# Patient Record
Sex: MALE | Race: BLACK OR AFRICAN AMERICAN | NOT HISPANIC OR LATINO | Employment: OTHER | RURAL
[De-identification: names, ages, dates, MRNs, and addresses within clinical notes are randomized per-mention and may not be internally consistent; named-entity substitution may affect disease eponyms.]

---

## 2022-07-21 ENCOUNTER — OFFICE VISIT (OUTPATIENT)
Dept: FAMILY MEDICINE | Facility: CLINIC | Age: 67
End: 2022-07-21
Payer: OTHER GOVERNMENT

## 2022-07-21 VITALS
SYSTOLIC BLOOD PRESSURE: 108 MMHG | OXYGEN SATURATION: 94 % | WEIGHT: 144.31 LBS | TEMPERATURE: 99 F | BODY MASS INDEX: 20.2 KG/M2 | HEIGHT: 71 IN | DIASTOLIC BLOOD PRESSURE: 82 MMHG | HEART RATE: 60 BPM

## 2022-07-21 DIAGNOSIS — Z86.73 HISTORY OF STROKE: ICD-10-CM

## 2022-07-21 DIAGNOSIS — G89.29 CHRONIC PAIN OF RIGHT KNEE: Chronic | ICD-10-CM

## 2022-07-21 DIAGNOSIS — Z79.899 ENCOUNTER FOR LONG-TERM (CURRENT) USE OF OTHER MEDICATIONS: ICD-10-CM

## 2022-07-21 DIAGNOSIS — R56.9 SEIZURES: ICD-10-CM

## 2022-07-21 DIAGNOSIS — Z12.5 PROSTATE CANCER SCREENING: ICD-10-CM

## 2022-07-21 DIAGNOSIS — E78.00 HYPERCHOLESTEREMIA: Chronic | ICD-10-CM

## 2022-07-21 DIAGNOSIS — M54.41 CHRONIC BILATERAL LOW BACK PAIN WITH RIGHT-SIDED SCIATICA: Chronic | ICD-10-CM

## 2022-07-21 DIAGNOSIS — I10 ESSENTIAL (PRIMARY) HYPERTENSION: Primary | Chronic | ICD-10-CM

## 2022-07-21 DIAGNOSIS — M25.561 CHRONIC PAIN OF RIGHT KNEE: Chronic | ICD-10-CM

## 2022-07-21 DIAGNOSIS — G89.29 CHRONIC BILATERAL LOW BACK PAIN WITH RIGHT-SIDED SCIATICA: Chronic | ICD-10-CM

## 2022-07-21 DIAGNOSIS — Z11.59 NEED FOR HEPATITIS C SCREENING TEST: ICD-10-CM

## 2022-07-21 PROBLEM — F17.210 NICOTINE DEPENDENCE, CIGARETTES, UNCOMPLICATED: Status: ACTIVE | Noted: 2022-07-21

## 2022-07-21 LAB
ALBUMIN SERPL BCP-MCNC: 3.8 G/DL (ref 3.5–5)
ALBUMIN/GLOB SERPL: 0.9 {RATIO}
ALP SERPL-CCNC: 102 U/L (ref 45–115)
ALT SERPL W P-5'-P-CCNC: 12 U/L (ref 16–61)
ANION GAP SERPL CALCULATED.3IONS-SCNC: 17 MMOL/L (ref 7–16)
AST SERPL W P-5'-P-CCNC: 11 U/L (ref 15–37)
BASOPHILS # BLD AUTO: 0.08 K/UL (ref 0–0.2)
BASOPHILS NFR BLD AUTO: 0.7 % (ref 0–1)
BILIRUB SERPL-MCNC: 0.4 MG/DL (ref 0–1.2)
BUN SERPL-MCNC: 24 MG/DL (ref 7–18)
BUN/CREAT SERPL: 13 (ref 6–20)
CALCIUM SERPL-MCNC: 9 MG/DL (ref 8.5–10.1)
CHLORIDE SERPL-SCNC: 103 MMOL/L (ref 98–107)
CHOLEST SERPL-MCNC: 211 MG/DL (ref 0–200)
CHOLEST/HDLC SERPL: 2.6 {RATIO}
CO2 SERPL-SCNC: 20 MMOL/L (ref 21–32)
CREAT SERPL-MCNC: 1.81 MG/DL (ref 0.7–1.3)
DIFFERENTIAL METHOD BLD: ABNORMAL
EOSINOPHIL # BLD AUTO: 0.27 K/UL (ref 0–0.5)
EOSINOPHIL NFR BLD AUTO: 2.5 % (ref 1–4)
ERYTHROCYTE [DISTWIDTH] IN BLOOD BY AUTOMATED COUNT: 14.1 % (ref 11.5–14.5)
GLOBULIN SER-MCNC: 4.2 G/DL (ref 2–4)
GLUCOSE SERPL-MCNC: 75 MG/DL (ref 74–106)
HCT VFR BLD AUTO: 41.4 % (ref 40–54)
HCV AB SER QL: NORMAL
HDLC SERPL-MCNC: 80 MG/DL (ref 40–60)
HGB BLD-MCNC: 14 G/DL (ref 13.5–18)
IMM GRANULOCYTES # BLD AUTO: 0.03 K/UL (ref 0–0.04)
IMM GRANULOCYTES NFR BLD: 0.3 % (ref 0–0.4)
LDLC SERPL CALC-MCNC: 110 MG/DL
LDLC/HDLC SERPL: 1.4 {RATIO}
LYMPHOCYTES # BLD AUTO: 1.69 K/UL (ref 1–4.8)
LYMPHOCYTES NFR BLD AUTO: 15.7 % (ref 27–41)
MCH RBC QN AUTO: 31 PG (ref 27–31)
MCHC RBC AUTO-ENTMCNC: 33.8 G/DL (ref 32–36)
MCV RBC AUTO: 91.6 FL (ref 80–96)
MONOCYTES # BLD AUTO: 1 K/UL (ref 0–0.8)
MONOCYTES NFR BLD AUTO: 9.3 % (ref 2–6)
MPC BLD CALC-MCNC: 11 FL (ref 9.4–12.4)
NEUTROPHILS # BLD AUTO: 7.71 K/UL (ref 1.8–7.7)
NEUTROPHILS NFR BLD AUTO: 71.5 % (ref 53–65)
NONHDLC SERPL-MCNC: 131 MG/DL
NRBC # BLD AUTO: 0 X10E3/UL
NRBC, AUTO (.00): 0 %
PLATELET # BLD AUTO: 433 K/UL (ref 150–400)
POTASSIUM SERPL-SCNC: 4.3 MMOL/L (ref 3.5–5.1)
PROT SERPL-MCNC: 8 G/DL (ref 6.4–8.2)
PSA SERPL-MCNC: 6.5 NG/ML (ref 0–4.1)
RBC # BLD AUTO: 4.52 M/UL (ref 4.6–6.2)
SODIUM SERPL-SCNC: 136 MMOL/L (ref 136–145)
TRIGL SERPL-MCNC: 103 MG/DL (ref 35–150)
TSH SERPL DL<=0.005 MIU/L-ACNC: 0.73 UIU/ML (ref 0.36–3.74)
VLDLC SERPL-MCNC: 21 MG/DL
WBC # BLD AUTO: 10.78 K/UL (ref 4.5–11)

## 2022-07-21 PROCEDURE — 86803 HEPATITIS C ANTIBODY: ICD-10-PCS | Mod: ,,, | Performed by: CLINICAL MEDICAL LABORATORY

## 2022-07-21 PROCEDURE — 80061 LIPID PANEL: CPT | Mod: ,,, | Performed by: CLINICAL MEDICAL LABORATORY

## 2022-07-21 PROCEDURE — 80053 COMPREHENSIVE METABOLIC PANEL: ICD-10-PCS | Mod: ,,, | Performed by: CLINICAL MEDICAL LABORATORY

## 2022-07-21 PROCEDURE — 99204 OFFICE O/P NEW MOD 45 MIN: CPT | Mod: ,,, | Performed by: NURSE PRACTITIONER

## 2022-07-21 PROCEDURE — 85025 COMPLETE CBC W/AUTO DIFF WBC: CPT | Mod: ,,, | Performed by: CLINICAL MEDICAL LABORATORY

## 2022-07-21 PROCEDURE — 86803 HEPATITIS C AB TEST: CPT | Mod: ,,, | Performed by: CLINICAL MEDICAL LABORATORY

## 2022-07-21 PROCEDURE — 80061 LIPID PANEL: ICD-10-PCS | Mod: ,,, | Performed by: CLINICAL MEDICAL LABORATORY

## 2022-07-21 PROCEDURE — 99204 PR OFFICE/OUTPT VISIT, NEW, LEVL IV, 45-59 MIN: ICD-10-PCS | Mod: ,,, | Performed by: NURSE PRACTITIONER

## 2022-07-21 PROCEDURE — G0103 PSA SCREENING: HCPCS | Mod: ,,, | Performed by: CLINICAL MEDICAL LABORATORY

## 2022-07-21 PROCEDURE — 84443 ASSAY THYROID STIM HORMONE: CPT | Mod: ,,, | Performed by: CLINICAL MEDICAL LABORATORY

## 2022-07-21 PROCEDURE — 84443 TSH: ICD-10-PCS | Mod: ,,, | Performed by: CLINICAL MEDICAL LABORATORY

## 2022-07-21 PROCEDURE — 80053 COMPREHEN METABOLIC PANEL: CPT | Mod: ,,, | Performed by: CLINICAL MEDICAL LABORATORY

## 2022-07-21 PROCEDURE — G0103 PSA, SCREENING: ICD-10-PCS | Mod: ,,, | Performed by: CLINICAL MEDICAL LABORATORY

## 2022-07-21 PROCEDURE — 85025 CBC WITH DIFFERENTIAL: ICD-10-PCS | Mod: ,,, | Performed by: CLINICAL MEDICAL LABORATORY

## 2022-07-21 RX ORDER — ATORVASTATIN CALCIUM 40 MG/1
40 TABLET, FILM COATED ORAL DAILY
Qty: 90 TABLET | Refills: 3 | Status: SHIPPED | OUTPATIENT
Start: 2022-07-21 | End: 2022-10-27 | Stop reason: SDUPTHER

## 2022-07-21 RX ORDER — ASPIRIN 325 MG
325 TABLET ORAL DAILY
COMMUNITY
End: 2023-05-08 | Stop reason: DRUGHIGH

## 2022-07-21 NOTE — PROGRESS NOTES
JUSTINA ROMAN [facility] - FAMILY MEDICINE       PATIENT NAME: Steven Campo   : 1955    AGE: 66 y.o. DATE: 2022    MRN: [id number]        Reason for Visit / Chief Complaint:  [facility] (Patient need to start back taking medication for bp, high cholesterol. Patient states he is having pain in his back and knee. And was also taking meds for stroke. Patient states he was taking pills for nightmares but he stop taking them. Patient is no longer having nightmares. )     Subjective:     HPI:    Presents with sister to establish a new provider.     [facility] [place] but [facility] is no longer there. Was advised to get a PCP closer to home (lives in [place]) but hasn't seen anyone or taken any meds in 1 yr. Does not know what meds he was on and no records are available.  Hx nightmares in the past after being [place], but none recently.    Hx HTN but BP today is okay.    Hx chronic R sided weakness since stroke. Chronic R knee & LBP - tylenol helps, but was on rx in the past.     Hx seizures on med in past, last sz approx 7 mths ago.    No past resp conditions but admits chronic cough & SOB.  Smoking 1.5 ppd x 44 yrs.    Reports had a colonoscopy at [facility] in the past w/ 3 polyps removed.    Review of Systems:     Review of Systems   Constitutional: Negative.    HENT: Negative.    Eyes: Negative.    Respiratory: Positive for cough and shortness of breath. Negative for wheezing.    Cardiovascular: Negative.    Gastrointestinal: Negative.    Endocrine: Negative.    Genitourinary: Negative.    Musculoskeletal: Negative.    Skin: Negative.    Allergic/Immunologic: Negative.    Neurological: Negative.    Hematological: Negative.    Psychiatric/Behavioral: Negative.        Allergies:     Review of patient's allergies indicates:  No Known Allergies     Medical History:     Past Medical History:   Diagnosis Date    Chronic bilateral low back pain with  "right-sided sciatica     Chronic pain of right knee     Essential (primary) hypertension     Hypercholesteremia     Nicotine dependence, cigarettes, uncomplicated     Seizures 2020    last was approx 7 mths ago    Stroke 2015    with residual right sided weakness      Social History     Tobacco Use   Smoking Status Current Every Day Smoker    Packs/day: 1.50    Years: 44.00    Pack years: 66.00    Types: Cigarettes   Smokeless Tobacco Never Used      Past Surgical History:   Procedure Laterality Date    KNEE ARTHROSCOPY Right 1995      Objective:      Wt Readings from Last 3 Encounters:   07/21/22 1329 65.5 kg (144 lb 4.8 oz)     Vitals:    07/21/22 1329   BP: 108/82   BP Location: Left arm   Patient Position: Sitting   BP Method: Large (Manual)   Pulse: 60   Temp: 98.6 °F (37 °C)   SpO2: (!) 94%   Weight: 65.5 kg (144 lb 4.8 oz)   Height: 5' 11" (1.803 m)     Body mass index is 20.13 kg/m².     Physical Exam:    Physical Exam  Vitals and nursing note reviewed.   Constitutional:       General: He is not in acute distress.     Appearance: Normal appearance.   HENT:      Head: Normocephalic.      Right Ear: Tympanic membrane, ear canal and external ear normal.      Left Ear: Tympanic membrane, ear canal and external ear normal.      Nose: Nose normal.      Mouth/Throat:      Mouth: Mucous membranes are moist.      Pharynx: Oropharynx is clear.   Eyes:      Conjunctiva/sclera: Conjunctivae normal.   Neck:      Thyroid: No thyromegaly.      Vascular: Normal carotid pulses. No carotid bruit.   Cardiovascular:      Rate and Rhythm: Normal rate and regular rhythm.      Pulses: Normal pulses.      Heart sounds: Normal heart sounds.   Pulmonary:      Effort: Pulmonary effort is normal. No respiratory distress.      Breath sounds: Normal breath sounds.   Abdominal:      Palpations: Abdomen is soft.      Tenderness: There is no abdominal tenderness.   Musculoskeletal:      Cervical back: Neck supple.      Right " lower leg: No edema.      Left lower leg: No edema.   Lymphadenopathy:      Cervical: No cervical adenopathy.   Skin:     General: Skin is warm and dry.   Neurological:      General: No focal deficit present.      Mental Status: He is alert and oriented to person, place, and time.      Motor: Weakness (RUE & RLE) present.      Gait: Gait abnormal.   Psychiatric:         Mood and Affect: Mood normal.         Behavior: Behavior normal.          Assessment:          ICD-10-CM ICD-9-CM   1. Essential (primary) hypertension  I10 401.9   2. Hypercholesteremia  E78.00 272.0   3. History of stroke  Z86.73 V12.54   4. Chronic bilateral low back pain with right-sided sciatica  M54.41 724.2    G89.29 724.3     338.29   5. Chronic pain of right knee  M25.561 719.46    G89.29 338.29   6. Seizures  R56.9 780.39   7. Need for hepatitis C screening test  Z11.59 V73.89   8. Encounter for long-term (current) use of other medications  Z79.899 V58.69   9. Prostate cancer screening  Z12.5 V76.44        Plan:       Essential (primary) hypertension    Hypercholesteremia  -     Comprehensive Metabolic Panel; Future; Expected date: 07/21/2022  -     Lipid Panel; Future; Expected date: 07/21/2022  -     atorvastatin (LIPITOR) 40 MG tablet; Take 1 tablet (40 mg total) by mouth once daily.  Dispense: 90 tablet; Refill: 3    History of stroke  -     Ambulatory referral/consult to Neurology; Future; Expected date: 07/28/2022    Chronic bilateral low back pain with right-sided sciatica    Chronic pain of right knee    Seizures  -     Ambulatory referral/consult to Neurology; Future; Expected date: 07/28/2022    Need for hepatitis C screening test  -     Hepatitis C Antibody; Future; Expected date: 07/21/2022    Encounter for long-term (current) use of other medications  -     CBC Auto Differential; Future; Expected date: 07/21/2022  -     Comprehensive Metabolic Panel; Future; Expected date: 07/21/2022  -     TSH; Future; Expected date:  07/21/2022    Prostate cancer screening  -     PSA, Screening; Future; Expected date: 07/21/2022        Current Outpatient Medications:     aspirin 325 MG tablet, Take 325 mg by mouth once daily., Disp: , Rfl:     atorvastatin (LIPITOR) 40 MG tablet, Take 1 tablet (40 mg total) by mouth once daily., Disp: 90 tablet, Rfl: 3  Active Problem List with Overview Notes    Diagnosis Date Noted    Essential (primary) hypertension 07/21/2022    Hypercholesteremia 07/21/2022    Chronic bilateral low back pain with right-sided sciatica 07/21/2022    Chronic pain of right knee 07/21/2022    Seizures 07/21/2022     last was approx 7 mths ago      Nicotine dependence, cigarettes, uncomplicated 07/21/2022    History of stroke 2015     with residual right sided weakness         New & refilled meds:  Requested Prescriptions     Signed Prescriptions Disp Refills    atorvastatin (LIPITOR) 40 MG tablet 90 tablet 3     Sig: Take 1 tablet (40 mg total) by mouth once daily.     No xray coverage in clinic.  Will not start a BP med at this time but will need to monitor closely.  Continue aspirin. Resume statin.  Labs today  very strongly urged to quit smoking  Refer to Neuro for hx of seizures & stroke w/ R sided weakness.    Return to clinic 3 mths for HLD, hx stroke, w/ fasting labs; and f/u as needed.    Future Appointments   Date Time Provider Department Center   8/30/2022  3:00 PM Harsha Sanchez MD [facility] NEURO [facility]   10/27/2022  9:20 AM JODY Jimenez [facility] [place]        Signature:  JODY Jimenez [facility] PRIMARY CARE - FAMILY MEDICINE    Date of encounter: 7/21/22

## 2022-07-28 DIAGNOSIS — R97.20 PSA ELEVATION: Primary | ICD-10-CM

## 2022-10-10 ENCOUNTER — OFFICE VISIT (OUTPATIENT)
Dept: FAMILY MEDICINE | Facility: CLINIC | Age: 67
End: 2022-10-10
Payer: OTHER GOVERNMENT

## 2022-10-10 ENCOUNTER — OFFICE VISIT (OUTPATIENT)
Dept: NEUROLOGY | Facility: CLINIC | Age: 67
End: 2022-10-10
Payer: MEDICARE

## 2022-10-10 VITALS
DIASTOLIC BLOOD PRESSURE: 96 MMHG | BODY MASS INDEX: 20.13 KG/M2 | RESPIRATION RATE: 20 BRPM | TEMPERATURE: 98 F | OXYGEN SATURATION: 95 % | HEIGHT: 71 IN | SYSTOLIC BLOOD PRESSURE: 142 MMHG | HEART RATE: 92 BPM

## 2022-10-10 VITALS
HEART RATE: 95 BPM | OXYGEN SATURATION: 95 % | HEIGHT: 71 IN | WEIGHT: 145 LBS | SYSTOLIC BLOOD PRESSURE: 160 MMHG | DIASTOLIC BLOOD PRESSURE: 90 MMHG | BODY MASS INDEX: 20.3 KG/M2

## 2022-10-10 DIAGNOSIS — I10 ESSENTIAL (PRIMARY) HYPERTENSION: Chronic | ICD-10-CM

## 2022-10-10 DIAGNOSIS — J22 LRTI (LOWER RESPIRATORY TRACT INFECTION): Primary | ICD-10-CM

## 2022-10-10 DIAGNOSIS — I63.89 OTHER CEREBRAL INFARCTION: ICD-10-CM

## 2022-10-10 DIAGNOSIS — R56.9 CONVULSIONS, UNSPECIFIED CONVULSION TYPE: ICD-10-CM

## 2022-10-10 DIAGNOSIS — R56.9 SEIZURES: Primary | ICD-10-CM

## 2022-10-10 DIAGNOSIS — F17.210 NICOTINE DEPENDENCE, CIGARETTES, UNCOMPLICATED: Chronic | ICD-10-CM

## 2022-10-10 DIAGNOSIS — J20.9 CHRONIC BRONCHITIS WITH ACUTE EXACERBATION: ICD-10-CM

## 2022-10-10 DIAGNOSIS — J42 CHRONIC BRONCHITIS WITH ACUTE EXACERBATION: ICD-10-CM

## 2022-10-10 PROCEDURE — 99204 PR OFFICE/OUTPT VISIT, NEW, LEVL IV, 45-59 MIN: ICD-10-PCS | Mod: S$PBB,,, | Performed by: PSYCHIATRY & NEUROLOGY

## 2022-10-10 PROCEDURE — 99214 OFFICE O/P EST MOD 30 MIN: CPT | Mod: PBBFAC | Performed by: PSYCHIATRY & NEUROLOGY

## 2022-10-10 PROCEDURE — 99214 PR OFFICE/OUTPT VISIT, EST, LEVL IV, 30-39 MIN: ICD-10-PCS | Mod: ,,, | Performed by: NURSE PRACTITIONER

## 2022-10-10 PROCEDURE — 99214 OFFICE O/P EST MOD 30 MIN: CPT | Mod: ,,, | Performed by: NURSE PRACTITIONER

## 2022-10-10 PROCEDURE — 99204 OFFICE O/P NEW MOD 45 MIN: CPT | Mod: S$PBB,,, | Performed by: PSYCHIATRY & NEUROLOGY

## 2022-10-10 RX ORDER — IPRATROPIUM BROMIDE AND ALBUTEROL SULFATE 2.5; .5 MG/3ML; MG/3ML
3 SOLUTION RESPIRATORY (INHALATION) EVERY 6 HOURS PRN
Qty: 300 ML | Refills: 0 | Status: SHIPPED | OUTPATIENT
Start: 2022-10-10 | End: 2023-05-08 | Stop reason: SDUPTHER

## 2022-10-10 RX ORDER — BUDESONIDE 0.5 MG/2ML
0.5 INHALANT ORAL 2 TIMES DAILY
Qty: 360 ML | Refills: 1 | Status: SHIPPED | OUTPATIENT
Start: 2022-10-10 | End: 2023-05-08 | Stop reason: SDUPTHER

## 2022-10-10 RX ORDER — DOXYCYCLINE HYCLATE 100 MG
100 TABLET ORAL EVERY 12 HOURS
Qty: 20 TABLET | Refills: 0 | Status: SHIPPED | OUTPATIENT
Start: 2022-10-10 | End: 2022-10-20

## 2022-10-10 RX ORDER — PREDNISONE 20 MG/1
TABLET ORAL
Qty: 9 TABLET | Refills: 0 | Status: SHIPPED | OUTPATIENT
Start: 2022-10-10 | End: 2022-10-27 | Stop reason: ALTCHOICE

## 2022-10-10 NOTE — PROGRESS NOTES
"[facility]       PATIENT NAME: Steven Campo   : 1955    AGE: 67 y.o. DATE OF ENCOUNTER: 10/10/22   MRN: [id number]      Visit type: WALK-IN    Reason for Visit / Chief Complaint: Shortness of Breath (Pt presents with SOB x 3 weeks. He went to [facility] on 22. He was given 2 shots and some abx. He states he feels a little better since that visit. He denies fever or any other URI sxs.)     Subjective:     Presents w/ sister for c/o increased SOB x 3 weeks.  Went to [facility] 22 & had CXR - was tx w/ zpak & prednisone. Had some improvement but hasn't resolved & is having pain to right posterior ribcage w/ productive cough.   Hx chronic cough & SOB, smoker 1.5 ppd x 44 yrs.    Review of Systems:     Review of Systems   Constitutional: Negative.    HENT: Negative.     Respiratory:  Positive for cough, shortness of breath and wheezing.    Cardiovascular: Negative.    Skin: Negative.    Neurological: Negative.      Allergies:   Review of patient's allergies indicates:  No Known Allergies     Objective:     Vitals:    10/10/22 1139   BP: (!) 142/96   BP Location: Left arm   Patient Position: Sitting   BP Method: Large (Manual)   Pulse: 92   Resp: 20   Temp: 98 °F (36.7 °C)   TempSrc: Oral   SpO2: 95%   Weight: Comment: Pt unable to weigh.   Height: 5' 11" (1.803 m)     Body mass index is 20.13 kg/m².     Physical Exam:    Physical Exam  Vitals and nursing note reviewed.   Constitutional:       General: He is not in acute distress.     Appearance: Normal appearance.   HENT:      Head: Normocephalic.      Right Ear: Tympanic membrane, ear canal and external ear normal.      Left Ear: Tympanic membrane, ear canal and external ear normal.      Nose: Nose normal.      Mouth/Throat:      Mouth: Mucous membranes are moist.      Pharynx: Oropharynx is clear.   Eyes:      Conjunctiva/sclera: Conjunctivae normal.   Neck:      Thyroid: No thyromegaly or thyroid tenderness.      " Trachea: Trachea normal.   Cardiovascular:      Rate and Rhythm: Normal rate and regular rhythm.      Pulses: Normal pulses.      Heart sounds: Normal heart sounds.   Pulmonary:      Effort: Pulmonary effort is normal. No respiratory distress.      Breath sounds: Wheezing and rhonchi present.   Musculoskeletal:      Cervical back: Neck supple.      Right lower leg: No edema.      Left lower leg: No edema.   Lymphadenopathy:      Cervical: No cervical adenopathy.   Skin:     General: Skin is warm and dry.   Neurological:      Mental Status: He is alert and oriented to person, place, and time.   Psychiatric:         Mood and Affect: Mood normal.         Behavior: Behavior normal.       Assessment:          ICD-10-CM ICD-9-CM   1. LRTI (lower respiratory tract infection)  J22 519.8   2. Nicotine dependence, cigarettes, uncomplicated  F17.210 305.1   3. Essential (primary) hypertension  I10 401.9   4. Chronic bronchitis with acute exacerbation  J20.9 466.0    J42 491.9        Plan:     LRTI (lower respiratory tract infection)    Nicotine dependence, cigarettes, uncomplicated    Essential (primary) hypertension    Chronic bronchitis with acute exacerbation  -     doxycycline (VIBRA-TABS) 100 MG tablet; Take 1 tablet (100 mg total) by mouth every 12 (twelve) hours. for 10 days  Dispense: 20 tablet; Refill: 0  -     predniSONE (DELTASONE) 20 MG tablet; 1 tablet by mouth BID x 3 days, then 1 tab daily x 3 days  Dispense: 9 tablet; Refill: 0  -     albuterol-ipratropium (DUO-NEB) 2.5 mg-0.5 mg/3 mL nebulizer solution; Take 3 mLs by nebulization every 6 (six) hours as needed for Wheezing or Shortness of Breath. Rescue  Dispense: 300 mL; Refill: 0  -     budesonide (PULMICORT) 0.5 mg/2 mL nebulizer solution; Take 2 mLs (0.5 mg total) by nebulization 2 (two) times daily. Controller  Dispense: 360 mL; Refill: 1      Current Outpatient Medications:     aspirin 325 MG tablet, Take 325 mg by mouth once daily., Disp: , Rfl:      atorvastatin (LIPITOR) 40 MG tablet, Take 1 tablet (40 mg total) by mouth once daily., Disp: 90 tablet, Rfl: 3    albuterol-ipratropium (DUO-NEB) 2.5 mg-0.5 mg/3 mL nebulizer solution, Take 3 mLs by nebulization every 6 (six) hours as needed for Wheezing or Shortness of Breath. Rescue, Disp: 300 mL, Rfl: 0    budesonide (PULMICORT) 0.5 mg/2 mL nebulizer solution, Take 2 mLs (0.5 mg total) by nebulization 2 (two) times daily. Controller, Disp: 360 mL, Rfl: 1    doxycycline (VIBRA-TABS) 100 MG tablet, Take 1 tablet (100 mg total) by mouth every 12 (twelve) hours. for 10 days, Disp: 20 tablet, Rfl: 0    predniSONE (DELTASONE) 20 MG tablet, 1 tablet by mouth BID x 3 days, then 1 tab daily x 3 days, Disp: 9 tablet, Rfl: 0    Requested Prescriptions     Signed Prescriptions Disp Refills    doxycycline (VIBRA-TABS) 100 MG tablet 20 tablet 0     Sig: Take 1 tablet (100 mg total) by mouth every 12 (twelve) hours. for 10 days    predniSONE (DELTASONE) 20 MG tablet 9 tablet 0     Si tablet by mouth BID x 3 days, then 1 tab daily x 3 days    albuterol-ipratropium (DUO-NEB) 2.5 mg-0.5 mg/3 mL nebulizer solution 300 mL 0     Sig: Take 3 mLs by nebulization every 6 (six) hours as needed for Wheezing or Shortness of Breath. Rescue    budesonide (PULMICORT) 0.5 mg/2 mL nebulizer solution 360 mL 1     Sig: Take 2 mLs (0.5 mg total) by nebulization 2 (two) times daily. Controller       Have CXR report from [facility] scanned in.  Discussed possible CT chest - will re-visit at appt in 2 wks.  [facility] to [facility] for home nebulizer  Start budesonide tx bid. Use duoneb prn.  Doxy po plus another short course of prednisone  Strongly advised smoking cessation.    F/u 10/27/22 as scheduled.    Future Appointments   Date Time Provider Department Center   10/25/2022  1:45 PM [facility] MRI1 [facility]   10/27/2022  9:20 AM JODY Jimenez [facility] [place]   2023 11:00 AM Harsha Sanchez MD [facility] NEURO [place]  [place]       Signature: Dawn Dumont FNP-BC

## 2022-10-10 NOTE — PATIENT INSTRUCTIONS
Unknown etiology of his infreq seizures?  Mri brain   Extended EEG   Stop smoking if possible   Cont ASPIRIN and statin   F/u 3 months

## 2022-10-10 NOTE — PROGRESS NOTES
Patient is a 67-year-old black male [facility] of [facility]-according to patient is a very poor historian and only occurred intermittently last event being 1 year ago he occurring in the hallway and unknown etiology.  Patient reported he was on a previous antiepileptic medication which he cannot name and does not recall?  He has a remote history of left MCA stroke in 2015 with residual right hemiparesis and mild slurring speech.  Patient is only on aspirin 325 mg and statin and continue to smoke tobacco regularly.        Subjective:       Patient ID: Steven Campo is a 67 y.o. male     Chief Complaint:    Chief Complaint   Patient presents with    Seizures        Allergies:  Patient has no known allergies.    Current Medications:    Outpatient Encounter Medications as of 10/10/2022   Medication Sig Dispense Refill    albuterol-ipratropium (DUO-NEB) 2.5 mg-0.5 mg/3 mL nebulizer solution Take 3 mLs by nebulization every 6 (six) hours as needed for Wheezing or Shortness of Breath. Rescue 300 mL 0    aspirin 325 MG tablet Take 325 mg by mouth once daily.      atorvastatin (LIPITOR) 40 MG tablet Take 1 tablet (40 mg total) by mouth once daily. 90 tablet 3    budesonide (PULMICORT) 0.5 mg/2 mL nebulizer solution Take 2 mLs (0.5 mg total) by nebulization 2 (two) times daily. Controller 360 mL 1    doxycycline (VIBRA-TABS) 100 MG tablet Take 1 tablet (100 mg total) by mouth every 12 (twelve) hours. for 10 days 20 tablet 0    predniSONE (DELTASONE) 20 MG tablet 1 tablet by mouth BID x 3 days, then 1 tab daily x 3 days 9 tablet 0     No facility-administered encounter medications on file as of 10/10/2022.       History of Present Illness  HPI     Past Medical History:   Diagnosis Date    Chronic bilateral low back pain with right-sided sciatica     Chronic pain of right knee     Essential (primary) hypertension     Hypercholesteremia     Nicotine dependence, cigarettes, uncomplicated     Seizures  "2020    last was approx 7 mths ago    Stroke 2015    with residual right sided weakness       Past Surgical History:   Procedure Laterality Date    KNEE ARTHROSCOPY Right 1995       Social History  Mr. Campo  reports that he has been smoking cigarettes. He has a 66.00 pack-year smoking history. He has never used smokeless tobacco. He reports current alcohol use.  Drug: Marijuana.    Family History  Mr.'s Campo family history includes Heart disease in his brother and mother; Hyperlipidemia in his sister, sister, sister, and sister; Hypertension in his brother, brother, mother, sister, sister, sister, sister, and sister; Kidney failure in his sister; Lung cancer in his father; No Known Problems in his daughter, daughter, maternal grandfather, maternal grandmother, paternal grandfather, paternal grandmother, and sister; Pancreatic cancer in his sister.    Review of Systems  ROS   Objective:   BP (!) 160/90 (BP Location: Left arm, Patient Position: Sitting, BP Method: Large (Manual))   Pulse 95   Ht 5' 11" (1.803 m)   Wt 65.8 kg (145 lb)   SpO2 95%   BMI 20.22 kg/m²          Physical Exam     Assessment:     Seizures  -     EEG,extended monitoring,41-60 minutes; Future  -     Creatinine, serum; Future; Expected date: 10/10/2022    Convulsions, unspecified convulsion type  -     MRI Brain W WO Contrast; Future; Expected date: 10/10/2022    Other cerebral infarction  -     MRI Brain W WO Contrast; Future; Expected date: 10/10/2022       Primary Diagnosis and ICD10  Seizures [R56.9]    Plan:     Patient Instructions   Unknown etiology of his infreq seizures?  Mri brain   Extended EEG   Stop smoking if possible   Cont ASPIRIN and statin   F/u 3 months  There are no discontinued medications.    Requested Prescriptions      No prescriptions requested or ordered in this encounter         "

## 2022-10-27 ENCOUNTER — OFFICE VISIT (OUTPATIENT)
Dept: FAMILY MEDICINE | Facility: CLINIC | Age: 67
End: 2022-10-27
Payer: OTHER GOVERNMENT

## 2022-10-27 VITALS
SYSTOLIC BLOOD PRESSURE: 130 MMHG | HEIGHT: 71 IN | WEIGHT: 145 LBS | RESPIRATION RATE: 16 BRPM | DIASTOLIC BLOOD PRESSURE: 80 MMHG | OXYGEN SATURATION: 93 % | BODY MASS INDEX: 20.3 KG/M2 | HEART RATE: 108 BPM | TEMPERATURE: 98 F

## 2022-10-27 DIAGNOSIS — J41.0 SIMPLE CHRONIC BRONCHITIS: ICD-10-CM

## 2022-10-27 DIAGNOSIS — R22.2 MASS IN CHEST: ICD-10-CM

## 2022-10-27 DIAGNOSIS — R97.20 PSA ELEVATION: ICD-10-CM

## 2022-10-27 DIAGNOSIS — K63.5 POLYP OF COLON, UNSPECIFIED PART OF COLON, UNSPECIFIED TYPE: ICD-10-CM

## 2022-10-27 DIAGNOSIS — Z23 NEED FOR PNEUMOCOCCAL VACCINE: ICD-10-CM

## 2022-10-27 DIAGNOSIS — N18.32 STAGE 3B CHRONIC KIDNEY DISEASE: Chronic | ICD-10-CM

## 2022-10-27 DIAGNOSIS — Z79.899 ENCOUNTER FOR LONG-TERM (CURRENT) USE OF OTHER MEDICATIONS: ICD-10-CM

## 2022-10-27 DIAGNOSIS — E78.00 HYPERCHOLESTEREMIA: Primary | Chronic | ICD-10-CM

## 2022-10-27 DIAGNOSIS — Z13.6 ENCOUNTER FOR ABDOMINAL AORTIC ANEURYSM (AAA) SCREENING: ICD-10-CM

## 2022-10-27 DIAGNOSIS — F17.210 NICOTINE DEPENDENCE, CIGARETTES, UNCOMPLICATED: Chronic | ICD-10-CM

## 2022-10-27 DIAGNOSIS — Z86.010 HISTORY OF COLON POLYPS: ICD-10-CM

## 2022-10-27 PROBLEM — J22 LRTI (LOWER RESPIRATORY TRACT INFECTION): Status: RESOLVED | Noted: 2022-10-10 | Resolved: 2022-10-27

## 2022-10-27 LAB
ALBUMIN SERPL BCP-MCNC: 3.5 G/DL (ref 3.5–5)
ALBUMIN/GLOB SERPL: 0.7 {RATIO}
ALP SERPL-CCNC: 106 U/L (ref 45–115)
ALT SERPL W P-5'-P-CCNC: 13 U/L (ref 16–61)
ANION GAP SERPL CALCULATED.3IONS-SCNC: 16 MMOL/L (ref 7–16)
AST SERPL W P-5'-P-CCNC: 15 U/L (ref 15–37)
BILIRUB SERPL-MCNC: 0.4 MG/DL (ref ?–1.2)
BUN SERPL-MCNC: 24 MG/DL (ref 7–18)
BUN/CREAT SERPL: 16 (ref 6–20)
CALCIUM SERPL-MCNC: 9.3 MG/DL (ref 8.5–10.1)
CHLORIDE SERPL-SCNC: 101 MMOL/L (ref 98–107)
CHOLEST SERPL-MCNC: 285 MG/DL (ref 0–200)
CHOLEST/HDLC SERPL: 3.8 {RATIO}
CO2 SERPL-SCNC: 21 MMOL/L (ref 21–32)
CREAT SERPL-MCNC: 1.46 MG/DL (ref 0.7–1.3)
EGFR (NO RACE VARIABLE) (RUSH/TITUS): 53 ML/MIN/1.73M²
GLOBULIN SER-MCNC: 4.9 G/DL (ref 2–4)
GLUCOSE SERPL-MCNC: 92 MG/DL (ref 74–106)
HDLC SERPL-MCNC: 76 MG/DL (ref 40–60)
LDLC SERPL CALC-MCNC: 195 MG/DL
LDLC/HDLC SERPL: 2.6 {RATIO}
NONHDLC SERPL-MCNC: 209 MG/DL
POTASSIUM SERPL-SCNC: 4.3 MMOL/L (ref 3.5–5.1)
PROT SERPL-MCNC: 8.4 G/DL (ref 6.4–8.2)
PSA SERPL-MCNC: 5.91 NG/ML
SODIUM SERPL-SCNC: 134 MMOL/L (ref 136–145)
TRIGL SERPL-MCNC: 72 MG/DL (ref 35–150)
VLDLC SERPL-MCNC: 14 MG/DL

## 2022-10-27 PROCEDURE — 99214 OFFICE O/P EST MOD 30 MIN: CPT | Mod: 25,,, | Performed by: NURSE PRACTITIONER

## 2022-10-27 PROCEDURE — 80053 COMPREHEN METABOLIC PANEL: CPT | Mod: ,,, | Performed by: CLINICAL MEDICAL LABORATORY

## 2022-10-27 PROCEDURE — 90677 PNEUMOCOCCAL CONJUGATE VACCINE 20-VALENT: ICD-10-PCS | Mod: ,,, | Performed by: NURSE PRACTITIONER

## 2022-10-27 PROCEDURE — 99214 PR OFFICE/OUTPT VISIT, EST, LEVL IV, 30-39 MIN: ICD-10-PCS | Mod: 25,,, | Performed by: NURSE PRACTITIONER

## 2022-10-27 PROCEDURE — 80061 LIPID PANEL: ICD-10-PCS | Mod: ,,, | Performed by: CLINICAL MEDICAL LABORATORY

## 2022-10-27 PROCEDURE — 90471 PNEUMOCOCCAL CONJUGATE VACCINE 20-VALENT: ICD-10-PCS | Mod: ,,, | Performed by: NURSE PRACTITIONER

## 2022-10-27 PROCEDURE — 80061 LIPID PANEL: CPT | Mod: ,,, | Performed by: CLINICAL MEDICAL LABORATORY

## 2022-10-27 PROCEDURE — 90677 PCV20 VACCINE IM: CPT | Mod: ,,, | Performed by: NURSE PRACTITIONER

## 2022-10-27 PROCEDURE — 80053 COMPREHENSIVE METABOLIC PANEL: ICD-10-PCS | Mod: ,,, | Performed by: CLINICAL MEDICAL LABORATORY

## 2022-10-27 PROCEDURE — 90471 IMMUNIZATION ADMIN: CPT | Mod: ,,, | Performed by: NURSE PRACTITIONER

## 2022-10-27 PROCEDURE — 84153 PSA, TOTAL (DIAGNOSTIC): ICD-10-PCS | Mod: ,,, | Performed by: CLINICAL MEDICAL LABORATORY

## 2022-10-27 PROCEDURE — 84153 ASSAY OF PSA TOTAL: CPT | Mod: ,,, | Performed by: CLINICAL MEDICAL LABORATORY

## 2022-10-27 RX ORDER — ATORVASTATIN CALCIUM 40 MG/1
40 TABLET, FILM COATED ORAL DAILY
Qty: 90 TABLET | Refills: 3 | Status: SHIPPED | OUTPATIENT
Start: 2022-10-27 | End: 2023-05-08 | Stop reason: SDUPTHER

## 2022-10-27 NOTE — PROGRESS NOTES
[facility] - FAMILY MEDICINE       PATIENT NAME: Steven Campo   : 1955    AGE: 67 y.o. DATE OF ENCOUNTER: 10/27/22    MRN: [id number]      PCP: JODY Jimenez    Reason for Visit / Chief Complaint:  Follow-up (3 month follow up)     Subjective:     HPI:    Presents with sister for 3 mth f/u HLD, PSA elevation, hx stroke.  Took statin until 90 day rx ran out & didn't realize he had refills.  Hasn't eaten but had juice d/t feeling sick, better after juice.    Seen 2 wks ago for LRTI w/ increased SOB, reports s/s resolved.  Hx COPD/chronic bronchitis w/ chronic SOB & cough.    Reviewed CXR report from [facility] w/ evidence of old granulomatous dz & unusual curvature to R heart border, possible fat density but exact etiology uncertain, suggesting further delineation w/ CT imaging.    Saw Neuro, had MRI brain.    Hx colon polyps - note in chart 2015 entered by Mercedes Higgins, transverse polyps, 1 yr f/u recommended.  Last colonoscopy done at [facility].  Canceled c-scope scheduled per Dr. Monson 18.    Review of Systems:     Review of Systems   Constitutional:  Positive for fatigue.   HENT: Negative.     Eyes: Negative.    Respiratory:  Positive for cough and shortness of breath. Negative for wheezing.         Chronic cough & SOB.  Smoking 1.5 ppd > 44 yrs.   Cardiovascular: Negative.    Gastrointestinal:  Positive for nausea (resolved after drinking juice). Negative for abdominal pain, blood in stool, constipation, diarrhea and vomiting.   Endocrine: Negative.    Genitourinary: Negative.    Musculoskeletal:  Positive for arthralgias and back pain.        Chronic R knee & LBP    Skin: Negative.    Allergic/Immunologic: Negative.    Neurological:  Positive for weakness (chronic R sided weakness since stroke).   Hematological: Negative.    Psychiatric/Behavioral: Negative.       Allergies:     Review of patient's allergies indicates:  No Known Allergies     Labs:      Lipids:   Lab  Results   Component Value Date    CHOL 211 (H) 07/21/2022     Lab Results   Component Value Date    HDL 80 (H) 07/21/2022     Lab Results   Component Value Date    LDLCALC 110 07/21/2022     Lab Results   Component Value Date    TRIG 103 07/21/2022     CMP:  Sodium   Date Value Ref Range Status   07/21/2022 136 136 - 145 mmol/L Final     Potassium   Date Value Ref Range Status   07/21/2022 4.3 3.5 - 5.1 mmol/L Final     Chloride   Date Value Ref Range Status   07/21/2022 103 98 - 107 mmol/L Final     Glucose   Date Value Ref Range Status   07/21/2022 75 74 - 106 mg/dL Final     BUN   Date Value Ref Range Status   07/21/2022 24 (H) 7 - 18 mg/dL Final     Creatinine   Date Value Ref Range Status   07/21/2022 1.81 (H) 0.70 - 1.30 mg/dL Final     AST   Date Value Ref Range Status   07/21/2022 11 (L) 15 - 37 U/L Final     ALT   Date Value Ref Range Status   07/21/2022 12 (L) 16 - 61 U/L Final     eGFR   Date Value Ref Range Status   07/21/2022 40 (L) >=60 mL/min/1.73m² Final      CBC:  Lab Results   Component Value Date    WBC 10.78 07/21/2022    RBC 4.52 (L) 07/21/2022    HGB 14.0 07/21/2022    HCT 41.4 07/21/2022     (H) 07/21/2022      TSH:  Lab Results   Component Value Date    TSH 0.727 07/21/2022       Medical History:     Past Medical History:   Diagnosis Date    Chronic bilateral low back pain with right-sided sciatica     Chronic pain of right knee     Essential (primary) hypertension     Hypercholesteremia     Nicotine dependence, cigarettes, uncomplicated     Seizures 2020    last was approx 7 mths ago    Stroke 2015    with residual right sided weakness      Social History     Tobacco Use   Smoking Status Every Day    Packs/day: 0.50    Years: 44.00    Pack years: 22.00    Types: Cigarettes   Smokeless Tobacco Never      Objective:      Vitals:    10/27/22 0926   BP: 130/80   BP Location: Left arm   Patient Position: Sitting   Pulse: 108   Resp: 16   Temp: 97.7 °F (36.5 °C)   SpO2: (!) 93%   Weight:  "65.8 kg (145 lb)   Height: 5' 11" (1.803 m)     Body mass index is 20.22 kg/m².     Physical Exam:    Physical Exam  Vitals and nursing note reviewed.   Constitutional:       General: He is not in acute distress.     Appearance: Normal appearance.   HENT:      Head: Normocephalic.      Right Ear: Tympanic membrane, ear canal and external ear normal.      Left Ear: Tympanic membrane, ear canal and external ear normal.      Nose: Nose normal.      Mouth/Throat:      Mouth: Mucous membranes are moist.      Pharynx: Oropharynx is clear.   Eyes:      Conjunctiva/sclera: Conjunctivae normal.   Neck:      Thyroid: No thyromegaly.      Vascular: Normal carotid pulses. No carotid bruit.   Cardiovascular:      Rate and Rhythm: Normal rate and regular rhythm.      Pulses: Normal pulses.      Heart sounds: Normal heart sounds.   Pulmonary:      Effort: Pulmonary effort is normal. No respiratory distress.      Breath sounds: Wheezing present.   Abdominal:      Palpations: Abdomen is soft.      Tenderness: There is no abdominal tenderness.   Musculoskeletal:      Cervical back: Neck supple.      Right lower leg: No edema.      Left lower leg: No edema.   Lymphadenopathy:      Cervical: No cervical adenopathy.   Skin:     General: Skin is warm and dry.   Neurological:      General: No focal deficit present.      Mental Status: He is alert and oriented to person, place, and time.      Motor: Weakness (RUE & RLE) present.      Gait: Gait abnormal.   Psychiatric:         Mood and Affect: Mood normal.         Behavior: Behavior normal.        Assessment:          ICD-10-CM ICD-9-CM   1. Hypercholesteremia  E78.00 272.0   2. Simple chronic bronchitis  J41.0 491.0   3. Nicotine dependence, cigarettes, uncomplicated  F17.210 305.1   4. Encounter for long-term (current) use of other medications  Z79.899 V58.69   5. Stage 3b chronic kidney disease  N18.32 585.3   6. PSA elevation  R97.20 790.93   7. History of colon polyps  Z86.010 V12.72 "   8. Need for pneumococcal vaccine  Z23 V03.82   9. Encounter for abdominal aortic aneurysm (AAA) screening  Z13.6 V81.2   10. Mass in chest  R22.2 786.6   11. Polyp of colon, unspecified part of colon, unspecified type  K63.5 211.3        Plan:       Hypercholesteremia  -     Lipid Panel; Future; Expected date: 10/27/2022  -     Comprehensive Metabolic Panel; Future; Expected date: 10/27/2022  -     atorvastatin (LIPITOR) 40 MG tablet; Take 1 tablet (40 mg total) by mouth once daily.  Dispense: 90 tablet; Refill: 3    Simple chronic bronchitis    Nicotine dependence, cigarettes, uncomplicated  -     US AAA Screening; Future; Expected date: 11/03/2022  -     CT Chest With Contrast; Future; Expected date: 10/27/2022    Encounter for long-term (current) use of other medications  -     Comprehensive Metabolic Panel; Future; Expected date: 10/27/2022    Stage 3b chronic kidney disease  -     Comprehensive Metabolic Panel; Future; Expected date: 10/27/2022    PSA elevation  -     PSA, Total (Diagnostic); Future; Expected date: 10/27/2022    History of colon polyps    Need for pneumococcal vaccine  -     Pneumococcal Conjugate Vaccine (20 Valent) (IM)    Encounter for abdominal aortic aneurysm (AAA) screening  -      AAA Screening; Future; Expected date: 11/03/2022    Mass in chest  Comments:  CXR 9/21/22 [facility] - unusual R heart border, etiology uncertain, suggest CT imaging  Orders:  -     CT Chest With Contrast; Future; Expected date: 10/27/2022    Polyp of colon, unspecified part of colon, unspecified type  -     Ambulatory referral/consult to Gastroenterology; Future; Expected date: 11/03/2022    Other orders  -     Cancel: Creatinine, serum      Current Outpatient Medications:     albuterol-ipratropium (DUO-NEB) 2.5 mg-0.5 mg/3 mL nebulizer solution, Take 3 mLs by nebulization every 6 (six) hours as needed for Wheezing or Shortness of Breath. Rescue, Disp: 300 mL, Rfl: 0    aspirin 325 MG tablet, Take 325 mg by  mouth once daily., Disp: , Rfl:     budesonide (PULMICORT) 0.5 mg/2 mL nebulizer solution, Take 2 mLs (0.5 mg total) by nebulization 2 (two) times daily. Controller, Disp: 360 mL, Rfl: 1    atorvastatin (LIPITOR) 40 MG tablet, Take 1 tablet (40 mg total) by mouth once daily., Disp: 90 tablet, Rfl: 3    New & refilled meds:  Requested Prescriptions     Signed Prescriptions Disp Refills    atorvastatin (LIPITOR) 40 MG tablet 90 tablet 3     Sig: Take 1 tablet (40 mg total) by mouth once daily.     order CT chest, may have to be non-contrast depending on creatinine  AAA screen  Refer to GI for updated colonoscopy.  Recheck PSA & if still high will refer to Uro.  Discussed may also refer to Nephrology.    Prevnar-20 today  Declines flu shot.    Stressed daily statin compliance - sent rx again thought x1 yr sent previously.  Strongly urged smoking cessation.  Lab results and schedule of future lab studies reviewed with patient.    Rx for nebulizer faxed to [facility]    Return to clinic 3 mths for HLD, COPD; and f/u as needed.    Future Appointments   Date Time Provider Department Center   1/11/2023 11:00 AM Harsha Sanchez MD [facility] NE[facility]   1/30/2023  9:40 AM JODY Jimenez [facility] [place]        Signature:  JODY Jimeenz

## 2022-11-01 DIAGNOSIS — Z12.11 ENCOUNTER FOR SCREENING COLONOSCOPY: Primary | ICD-10-CM

## 2022-11-01 RX ORDER — POLYETHYLENE GLYCOL 3350, SODIUM SULFATE ANHYDROUS, SODIUM BICARBONATE, SODIUM CHLORIDE, POTASSIUM CHLORIDE 236; 22.74; 6.74; 5.86; 2.97 G/4L; G/4L; G/4L; G/4L; G/4L
4 POWDER, FOR SOLUTION ORAL ONCE
Qty: 4000 ML | Refills: 0 | Status: SHIPPED | OUTPATIENT
Start: 2022-11-01 | End: 2022-11-01

## 2022-11-02 DIAGNOSIS — R97.20 PSA ELEVATION: Primary | ICD-10-CM

## 2022-11-03 NOTE — PROGRESS NOTES
Not surprised by very high cholesterol - he reported taking statin until 90 day rx ran out & didn't realize he had refills.  Stress dly med compliance.  Creatinine some improved this check; does have CKD stage 3.  PSA better but still high so referring to Uro Dr. Vito Velasquez at [place]

## 2022-11-17 ENCOUNTER — HOSPITAL ENCOUNTER (OUTPATIENT)
Dept: RADIOLOGY | Facility: HOSPITAL | Age: 67
Discharge: HOME | End: 2022-11-17
Attending: NURSE PRACTITIONER
Payer: MEDICARE

## 2022-11-17 DIAGNOSIS — Z13.6 ENCOUNTER FOR ABDOMINAL AORTIC ANEURYSM (AAA) SCREENING: ICD-10-CM

## 2022-11-17 DIAGNOSIS — F17.210 NICOTINE DEPENDENCE, CIGARETTES, UNCOMPLICATED: ICD-10-CM

## 2022-11-17 PROCEDURE — 76706 US ABDL AORTA SCREEN AAA: CPT | Mod: 26,,, | Performed by: STUDENT IN AN ORGANIZED HEALTH CARE EDUCATION/TRAINING PROGRAM

## 2022-11-17 PROCEDURE — 76706 US AAA SCREENING: ICD-10-PCS | Mod: 26,,, | Performed by: STUDENT IN AN ORGANIZED HEALTH CARE EDUCATION/TRAINING PROGRAM

## 2022-11-17 PROCEDURE — 76706 US ABDL AORTA SCREEN AAA: CPT | Mod: TC

## 2022-11-18 ENCOUNTER — HOSPITAL ENCOUNTER (OUTPATIENT)
Dept: RADIOLOGY | Facility: HOSPITAL | Age: 67
Discharge: HOME | End: 2022-11-18
Attending: NURSE PRACTITIONER
Payer: MEDICARE

## 2022-11-18 DIAGNOSIS — R22.2 MASS IN CHEST: ICD-10-CM

## 2022-11-18 DIAGNOSIS — F17.210 NICOTINE DEPENDENCE, CIGARETTES, UNCOMPLICATED: ICD-10-CM

## 2022-11-18 LAB — CREAT SERPL-MCNC: 1.4 MG/DL (ref 0.6–1.3)

## 2022-11-18 PROCEDURE — 25500020 PHARM REV CODE 255: Performed by: NURSE PRACTITIONER

## 2022-11-18 PROCEDURE — 82565 ASSAY OF CREATININE: CPT

## 2022-11-18 PROCEDURE — 71260 CT THORAX DX C+: CPT | Mod: 26,,, | Performed by: RADIOLOGY

## 2022-11-18 PROCEDURE — 71260 CT CHEST WITH CONTRAST: ICD-10-PCS | Mod: 26,,, | Performed by: RADIOLOGY

## 2022-11-18 PROCEDURE — 71260 CT THORAX DX C+: CPT | Mod: TC

## 2022-11-18 RX ADMIN — IOPAMIDOL 75 ML: 755 INJECTION, SOLUTION INTRAVENOUS at 01:11

## 2022-11-22 ENCOUNTER — TELEPHONE (OUTPATIENT)
Dept: FAMILY MEDICINE | Facility: CLINIC | Age: 67
End: 2022-11-22
Payer: MEDICARE

## 2022-11-22 DIAGNOSIS — F17.210 NICOTINE DEPENDENCE, CIGARETTES, UNCOMPLICATED: Chronic | ICD-10-CM

## 2022-11-22 DIAGNOSIS — R91.8 MASS OF RIGHT LUNG: Primary | ICD-10-CM

## 2022-11-22 NOTE — TELEPHONE ENCOUNTER
I'm real confused; I just this message.  Am I referring to a [facility]?  Keke Dumont, JODY; Ronit Cooley, RN  Caller: Unspecified (Today,  2:31 PM)  Pt need referral for his Lung biopsy does he need appt to get the referral Pt# [phone]

## 2022-11-22 NOTE — TELEPHONE ENCOUNTER
Sent message back to Keke to notify pt's sister to  a CD with the CT on it & [facility]

## 2022-11-22 NOTE — TELEPHONE ENCOUNTER
CT CHEST WITH CONTRAST     CLINICAL HISTORY:  Soft tissue mass, chest, US/xray nondiagnostic;Nicotine dependence, cigarettes, uncomplicated     TECHNIQUE:  Low dose axial images, sagittal and coronal reformations were obtained from the thoracic inlet to the lung bases following the IV administration of 75 mL of Isovue 370.     COMPARISON:  None     FINDINGS:  There is a right hilar mass that is poorly defined and encases the bronchus intermedius.  Approximate measurements are difficult but axial ortho measurements being 3.8 x 3.2 cm.  There is atelectasis of the right lower and middle lobes.  Right sided moderate pleural effusion.  Additional nodule seen scattered about the right apex.  There is volume loss and rightward mediastinal shift.     Emphysema of the left lung with no nodule identified.     The heart is normal size.  Coronary and thoracic aortic calcifications.  There are right paratracheal prominent lymph nodes seen measuring up to 1 cm.     No acute abnormality identified of the partially seen upper abdomen.     No acute fracture or osseous lesion.     Impression:     Right hilar mass encasing the right bronchus intermedius concerning for malignancy.  Additional nodules in the right upper lung likely metastatic as well as right paratracheal adenopathy.  Biopsy recommended when feasible.        Electronically signed by: Emilio Kenny  Date:                                            11/18/2022  Time:                                           13:56           Exam Ended: 11/18/22 13:32 Last Resulted: 11/18/22 13:56           I accidentally closed his results w/o typing a result message.  He has a very concerning lung mass in the right lung as well as additional nodules & lymph nodes.  He needs a lung biopsy.   Considering the problems we have had with him needing a PA for every test/referral I have ordered, [facility] likely [facility] to [facility], Oncology, & have biopsy, but this MUST be  addressed ASAP.

## 2022-11-22 NOTE — TELEPHONE ENCOUNTER
Keke Bella, [place]  Dawn Dumont, JODY  I called and spoke with Steven Gonzales's sister. I advised her of your note and told her that his upcoming appts would be of the highest priority. I also advised her that it may be easier and less waiting time for them to use the [facility] clinic, to avoid delays with PA's due to insurance. She understood and she would make him an appt with the [facility] clinic asap. I told her if she needed anything to let us know and we could send her what we had to the [facility] clinic, if they ask for it.

## 2022-11-22 NOTE — TELEPHONE ENCOUNTER
I called Mrs. Cobos back and advised her to go by [facility] and  the disc with all of the images on it. I also advised her to take it with her to all of his future dr kaityts that are scheduled.

## 2022-11-23 NOTE — TELEPHONE ENCOUNTER
I have a community request form from the [facility] office to fill out stating that he needs a biopsy. It has to be approved by the [facility] first, then we can refer to someone that will do the biopsy.

## 2022-11-28 ENCOUNTER — TELEPHONE (OUTPATIENT)
Dept: FAMILY MEDICINE | Facility: CLINIC | Age: 67
End: 2022-11-28
Payer: MEDICARE

## 2022-11-28 NOTE — TELEPHONE ENCOUNTER
----- Message from Keke Bella MT sent at 11/23/2022  8:44 AM CST -----  Regarding: [facility] yay!  So I spoke with the [facility] clinic. We are listed as his primary care. The receptionsist at the [facility] in [facility] told me that if he needs a consult, referral, outside appt, etc... for us to fill out the consult care form ( they're faxing one over). We have to fill the form out, send it to the [facility] clinic in [place], they approve it, then we send his referrals out to whichever physician hes trying to see, then.....he gets an appt.....Then........ we call the [facility] clinic to let them know he has an appt, and finally.....call Mr. Campo to let him know when his appts are.

## 2022-11-28 NOTE — TELEPHONE ENCOUNTER
Ok so first priority is getting the biopsy.  He needs to see Pulmonology (Dr. Nugent) ASAP.  I'm not sure if he will be able to do the biopsy or if he will also need to see a surgeon.  Then we will also have to get a PA to see Oncology.

## 2022-11-28 NOTE — TELEPHONE ENCOUNTER
I added this message in with the phone encounter we already had going so all documentation would be together.

## 2022-11-28 NOTE — TELEPHONE ENCOUNTER
----- Message from Keke eBlla MT sent at 11/23/2022  8:44 AM CST -----  So I spoke with the [facility]. We are listed as his primary care. The receptionsist at the [facility] in [place] told me that if he needs a consult, referral, outside appt, etc... for us to fill out the consult care form ( they're faxing one over). We have to fill the form out, send it to the [facility] clinic in [place], they approve it, then we send his referrals out to whichever physician hes trying to see, then.....he gets an appt.....Then........ we call the [facility] clinic to let them know he has an appt, and finally.....call Mr. Campo to let him know when his appts are.

## 2022-11-30 ENCOUNTER — HOSPITAL ENCOUNTER (OUTPATIENT)
Dept: GASTROENTEROLOGY | Facility: HOSPITAL | Age: 67
Discharge: HOME | End: 2022-11-30
Attending: INTERNAL MEDICINE
Payer: MEDICARE

## 2022-11-30 ENCOUNTER — ANESTHESIA EVENT (OUTPATIENT)
Dept: GASTROENTEROLOGY | Facility: HOSPITAL | Age: 67
End: 2022-11-30
Payer: OTHER GOVERNMENT

## 2022-11-30 ENCOUNTER — ANESTHESIA (OUTPATIENT)
Dept: GASTROENTEROLOGY | Facility: HOSPITAL | Age: 67
End: 2022-11-30
Payer: OTHER GOVERNMENT

## 2022-11-30 VITALS
DIASTOLIC BLOOD PRESSURE: 104 MMHG | TEMPERATURE: 98 F | BODY MASS INDEX: 19.6 KG/M2 | HEART RATE: 83 BPM | WEIGHT: 140 LBS | HEIGHT: 71 IN | OXYGEN SATURATION: 99 % | SYSTOLIC BLOOD PRESSURE: 165 MMHG | RESPIRATION RATE: 18 BRPM

## 2022-11-30 DIAGNOSIS — Z12.11 COLON CANCER SCREENING: ICD-10-CM

## 2022-11-30 DIAGNOSIS — Z12.11 ENCOUNTER FOR SCREENING COLONOSCOPY: ICD-10-CM

## 2022-11-30 PROCEDURE — 37000008 HC ANESTHESIA 1ST 15 MINUTES

## 2022-11-30 PROCEDURE — 00812 ANES LWR INTST SCR COLSC: CPT

## 2022-11-30 PROCEDURE — D9220A PRA ANESTHESIA: Mod: ,,, | Performed by: NURSE ANESTHETIST, CERTIFIED REGISTERED

## 2022-11-30 PROCEDURE — 63600175 PHARM REV CODE 636 W HCPCS: Performed by: NURSE ANESTHETIST, CERTIFIED REGISTERED

## 2022-11-30 PROCEDURE — G0105 COLORECTAL SCRN; HI RISK IND: ICD-10-PCS | Mod: ,,, | Performed by: INTERNAL MEDICINE

## 2022-11-30 PROCEDURE — 37000009 HC ANESTHESIA EA ADD 15 MINS

## 2022-11-30 PROCEDURE — G0105 COLORECTAL SCRN; HI RISK IND: HCPCS | Mod: ,,, | Performed by: INTERNAL MEDICINE

## 2022-11-30 PROCEDURE — D9220A PRA ANESTHESIA: ICD-10-PCS | Mod: ,,, | Performed by: NURSE ANESTHETIST, CERTIFIED REGISTERED

## 2022-11-30 PROCEDURE — 25000003 PHARM REV CODE 250: Performed by: NURSE ANESTHETIST, CERTIFIED REGISTERED

## 2022-11-30 PROCEDURE — G0105 COLORECTAL SCRN; HI RISK IND: HCPCS | Performed by: INTERNAL MEDICINE

## 2022-11-30 RX ORDER — PROPOFOL 10 MG/ML
VIAL (ML) INTRAVENOUS
Status: DISCONTINUED | OUTPATIENT
Start: 2022-11-30 | End: 2022-11-30

## 2022-11-30 RX ORDER — LIDOCAINE HYDROCHLORIDE 20 MG/ML
INJECTION, SOLUTION EPIDURAL; INFILTRATION; INTRACAUDAL; PERINEURAL
Status: DISCONTINUED | OUTPATIENT
Start: 2022-11-30 | End: 2022-11-30

## 2022-11-30 RX ADMIN — PROPOFOL 30 MG: 10 INJECTION, EMULSION INTRAVENOUS at 09:11

## 2022-11-30 RX ADMIN — SODIUM CHLORIDE: 9 INJECTION, SOLUTION INTRAVENOUS at 09:11

## 2022-11-30 RX ADMIN — LIDOCAINE HYDROCHLORIDE 50 MG: 20 INJECTION, SOLUTION EPIDURAL; INFILTRATION; INTRACAUDAL; PERINEURAL at 09:11

## 2022-11-30 RX ADMIN — PROPOFOL 100 MG: 10 INJECTION, EMULSION INTRAVENOUS at 09:11

## 2022-11-30 RX ADMIN — PROPOFOL 20 MG: 10 INJECTION, EMULSION INTRAVENOUS at 09:11

## 2022-11-30 RX ADMIN — PROPOFOL 50 MG: 10 INJECTION, EMULSION INTRAVENOUS at 09:11

## 2022-11-30 NOTE — TRANSFER OF CARE
"Anesthesia Transfer of Care Note    Patient: Steven Campo    Procedure(s) Performed: Colonoscopy  Patient location: GI    Anesthesia Type: general    Transport from OR: Transported from OR on room air with adequate spontaneous ventilation    Post pain: adequate analgesia    Post assessment: no apparent anesthetic complications    Post vital signs: stable    Level of consciousness: awake and alert    Nausea/Vomiting: no nausea/vomiting    Complications: none    Transfer of care protocol was followed      Last vitals:   Visit Vitals  /86   Pulse 85   Temp 36.7 °C (98.1 °F)   Resp 18   Ht 5' 11" (1.803 m)   Wt 63.5 kg (140 lb)   SpO2 99%   BMI 19.53 kg/m²     "

## 2022-11-30 NOTE — ANESTHESIA POSTPROCEDURE EVALUATION
Anesthesia Post Evaluation    Patient: Steven Campo    Procedure(s) Performed: Colonoscopy    Final Anesthesia Type: general      Patient location during evaluation: GI PACU  Patient participation: Yes- Able to Participate  Level of consciousness: awake and alert  Post-procedure vital signs: reviewed and stable  Pain management: adequate  Airway patency: patent  ANN MARIE mitigation strategies: Multimodal analgesia  PONV status at discharge: No PONV  Anesthetic complications: no      Cardiovascular status: blood pressure returned to baseline  Respiratory status: spontaneous ventilation  Hydration status: euvolemic  Follow-up not needed.          Vitals Value Taken Time   /107 11/30/22 1007   Temp 36.7 °C (98.1 °F) 11/30/22 0939   Pulse 84 11/30/22 1009   Resp 18 11/30/22 1008   SpO2 98 % 11/30/22 1009   Vitals shown include unvalidated device data.      No case tracking events are documented in the log.      Pain/Charity Score: Charity Score: 10 (11/30/2022  9:45 AM)

## 2022-11-30 NOTE — H&P
"Gastroenterology Pre-procedure H&P    Chief Complaint: Colon cancer screening    History of Present Illness    Steven Campo is a 67 y.o. male that  has a past medical history of Chronic bilateral low back pain with right-sided sciatica, Chronic pain of right knee, Essential (primary) hypertension, Hypercholesteremia, Nicotine dependence, cigarettes, uncomplicated, Seizures (2020), and Stroke (2015). Patient with personal h/o colon polyps. Also with right hilar lung mass being worked up for cancer. Did have high risk polyp in 2015.  on med list, patient denies taking this "in quite some time." Discussed increased risk of bleeding if he is on this - he denies.     Patient denies wt loss, abdominal pain, diarrhea, melena/hematochezia, change in stool caliber, no anticoagulants, FMHx of GI related malignancies.      Past Medical History:   Diagnosis Date    Chronic bilateral low back pain with right-sided sciatica     Chronic pain of right knee     Essential (primary) hypertension     Hypercholesteremia     Nicotine dependence, cigarettes, uncomplicated     Seizures 2020    last was approx 7 mths ago    Stroke 2015    with residual right sided weakness       Past Surgical History:   Procedure Laterality Date    KNEE ARTHROSCOPY Right 1995       Family History   Problem Relation Age of Onset    Hypertension Mother     Heart disease Mother         mild MI, later in life    Lung cancer Father     Pancreatic cancer Sister     Hyperlipidemia Sister     Hypertension Sister     Kidney failure Sister     Hypertension Sister     Hyperlipidemia Sister     Hypertension Sister     Hyperlipidemia Sister     Hypertension Sister     Hyperlipidemia Sister     Hypertension Sister     No Known Problems Sister     Heart disease Brother     Hypertension Brother     Hypertension Brother     No Known Problems Daughter     No Known Problems Daughter     No Known Problems Maternal Grandmother     No Known Problems Maternal Grandfather  " "   No Known Problems Paternal Grandmother     No Known Problems Paternal Grandfather        Social History     Socioeconomic History    Marital status: Unknown   Tobacco Use    Smoking status: Every Day     Packs/day: 1.00     Years: 44.00     Pack years: 44.00     Types: Cigarettes    Smokeless tobacco: Never   Substance and Sexual Activity    Alcohol use: Yes     Comment: approx 12 pk per week & fifth of vodka per week    Sexual activity: Yes     Birth control/protection: Condom       Current Outpatient Medications   Medication Sig Dispense Refill    albuterol-ipratropium (DUO-NEB) 2.5 mg-0.5 mg/3 mL nebulizer solution Take 3 mLs by nebulization every 6 (six) hours as needed for Wheezing or Shortness of Breath. Rescue 300 mL 0    aspirin 325 MG tablet Take 325 mg by mouth once daily.      atorvastatin (LIPITOR) 40 MG tablet Take 1 tablet (40 mg total) by mouth once daily. 90 tablet 3    budesonide (PULMICORT) 0.5 mg/2 mL nebulizer solution Take 2 mLs (0.5 mg total) by nebulization 2 (two) times daily. Controller 360 mL 1     No current facility-administered medications for this encounter.       Review of patient's allergies indicates:  No Known Allergies    Objective:  Vitals:    11/30/22 0812   BP: (!) 165/101   Pulse: 86   Resp: 17   SpO2: 97%   Weight: 63.5 kg (140 lb)   Height: 5' 11" (1.803 m)        GEN: normal appearing, NAD, AAO x3  HENT: NCAT, anicteric, OP benign  CV: normal rate, regular rhythm  RESP: NABS, symmetric rise, unlabored  ABD: soft, ND, no guarding or TTP  SKIN: warm and dry  NEURO: grossly afocal    Assessment and Plan:    Proceed with:    Colonoscopy for previous adenomatous polyp    Marko Hammond MD  Gastroenterology    "

## 2022-11-30 NOTE — TELEPHONE ENCOUNTER
Keke Blela, [place]  You 3 hours ago (2:14 PM)     JACINTO  So, I talked to Molly at [facility]'s office. She said to go ahead and put the referral in for him. She is going to get him setup for the biopsy for us. She also has a contact with the [facility] to help the referral process be a tad smoother.        38.6

## 2022-11-30 NOTE — ANESTHESIA PREPROCEDURE EVALUATION
11/30/2022  Steven Campo is a 67 y.o., male.      Pre-op Assessment    I have reviewed the Patient Summary Reports.     I have reviewed the Nursing Notes. I have reviewed the NPO Status.   I have reviewed the Medications.     Review of Systems  Anesthesia Hx:  History of prior surgery of interest to airway management or planning: Denies Family Hx of Anesthesia complications.   Denies Personal Hx of Anesthesia complications.   Social:  Smoker    Hematology/Oncology:  Hematology Normal        EENT/Dental:EENT/Dental Normal   Cardiovascular:   Hypertension    Pulmonary:   COPD Shortness of breath CT Chest   Right hilar mass encasing the right bronchus intermedius concerning for malignancy. Additional nodules in the right upper lung likely metastatic as well as right paratracheal adenopathy.  Biopsy recommended when feasible.       [facility]   Renal/:   Chronic Renal Disease, CKD    Hepatic/GI:   Bowel Prep.    Neurological:   CVA Neuromuscular Disease, Seizures    Endocrine:  Endocrine Normal    Dermatological:  Skin Normal    Psych:  Psychiatric Normal           Physical Exam  General: Well nourished and Cooperative    Airway:  Mallampati: III / II  Mouth Opening: Normal  Tongue: Normal  Neck ROM: Normal ROM        Anesthesia Plan  Type of Anesthesia, risks & benefits discussed:    Anesthesia Type: Gen Natural Airway  Intra-op Monitoring Plan: Standard ASA Monitors  Post Op Pain Control Plan: multimodal analgesia  Informed Consent: Informed consent signed with the Patient and all parties understand the risks and agree with anesthesia plan.  All questions answered. Patient consented to blood products? Yes  ASA Score: 3  Day of Surgery Review of History & Physical: I have interviewed and examined the patient. I have reviewed the patient's H&P dated: There are no significant changes.     Ready For  Surgery From Anesthesia Perspective.     .    Past Medical History:   Diagnosis Date    Chronic bilateral low back pain with right-sided sciatica     Chronic pain of right knee     Essential (primary) hypertension     Hypercholesteremia     Nicotine dependence, cigarettes, uncomplicated     Seizures 2020    last was approx 7 mths ago    Stroke 2015    with residual right sided weakness       Past Surgical History:   Procedure Laterality Date    KNEE ARTHROSCOPY Right 1995       Family History   Problem Relation Age of Onset    Hypertension Mother     Heart disease Mother         mild MI, later in life    Lung cancer Father     Pancreatic cancer Sister     Hyperlipidemia Sister     Hypertension Sister     Kidney failure Sister     Hypertension Sister     Hyperlipidemia Sister     Hypertension Sister     Hyperlipidemia Sister     Hypertension Sister     Hyperlipidemia Sister     Hypertension Sister     No Known Problems Sister     Heart disease Brother     Hypertension Brother     Hypertension Brother     No Known Problems Daughter     No Known Problems Daughter     No Known Problems Maternal Grandmother     No Known Problems Maternal Grandfather     No Known Problems Paternal Grandmother     No Known Problems Paternal Grandfather        Social History     Socioeconomic History    Marital status: Unknown   Tobacco Use    Smoking status: Every Day     Packs/day: 1.00     Years: 44.00     Pack years: 44.00     Types: Cigarettes    Smokeless tobacco: Never   Substance and Sexual Activity    Alcohol use: Yes     Comment: approx 12 pk per week & fifth of vodka per week    Sexual activity: Yes     Birth control/protection: Condom       Current Outpatient Medications   Medication Sig Dispense Refill    albuterol-ipratropium (DUO-NEB) 2.5 mg-0.5 mg/3 mL nebulizer solution Take 3 mLs by nebulization every 6 (six) hours as needed for Wheezing or Shortness of Breath. Rescue 300 mL 0     aspirin 325 MG tablet Take 325 mg by mouth once daily.      atorvastatin (LIPITOR) 40 MG tablet Take 1 tablet (40 mg total) by mouth once daily. 90 tablet 3    budesonide (PULMICORT) 0.5 mg/2 mL nebulizer solution Take 2 mLs (0.5 mg total) by nebulization 2 (two) times daily. Controller 360 mL 1     No current facility-administered medications for this encounter.       Review of patient's allergies indicates:  No Known Allergies     Outpatient Medications as of 11/30/2022   Medication Sig Dispense Refill    albuterol-ipratropium (DUO-NEB) 2.5 mg-0.5 mg/3 mL nebulizer solution Take 3 mLs by nebulization every 6 (six) hours as needed for Wheezing or Shortness of Breath. Rescue 300 mL 0    aspirin 325 MG tablet Take 325 mg by mouth once daily.      atorvastatin (LIPITOR) 40 MG tablet Take 1 tablet (40 mg total) by mouth once daily. 90 tablet 3    budesonide (PULMICORT) 0.5 mg/2 mL nebulizer solution Take 2 mLs (0.5 mg total) by nebulization 2 (two) times daily. Controller 360 mL 1     No current facility-administered medications on file as of 11/30/2022.

## 2022-11-30 NOTE — DISCHARGE INSTRUCTIONS
Procedure Date  11/30/22     Impression  Overall Impression:   - Grade II internal hemorrhoids  - Prep was inadequate for screening purposes  - The exam was otherwise normal, no large/obstructing mass     Recommendation    Schedule repeat colonoscopy within the 12 months with a 2-day prep (2 days of clear liquids followed by usual golytely split prep)      Outcome of procedure: successful Colonoscopy  Disposition: patient to recovery following procedure; discharge to home when appropriate parameters met  Provisions for follow up: please call my office for any unexpected symptoms like chest or abdominal pain or bleeding following your procedure.  Final Diagnosis: personal history of colon polyp    NO DRIVING, OPERATING EQUIPMENT, OR SIGNING LEGAL DOCUMENTS FOR 24 HOURS.

## 2023-01-09 ENCOUNTER — TELEPHONE (OUTPATIENT)
Dept: PULMONOLOGY | Facility: CLINIC | Age: 68
End: 2023-01-09
Payer: MEDICARE

## 2023-01-09 NOTE — TELEPHONE ENCOUNTER
"Ms. Billingsfer Julia, [facility]  Phone [phone]   fax [phone]     was a "no show" for  consult appointment with  scheduled for today 1/9/23 as requested by .    Note to 's office per fax.    Our fax confirmed delivery of note.//gp    "

## 2023-01-11 ENCOUNTER — OFFICE VISIT (OUTPATIENT)
Dept: NEUROLOGY | Facility: CLINIC | Age: 68
End: 2023-01-11
Payer: OTHER GOVERNMENT

## 2023-01-11 VITALS
BODY MASS INDEX: 19.6 KG/M2 | WEIGHT: 140 LBS | SYSTOLIC BLOOD PRESSURE: 132 MMHG | OXYGEN SATURATION: 96 % | HEART RATE: 92 BPM | HEIGHT: 71 IN | DIASTOLIC BLOOD PRESSURE: 78 MMHG

## 2023-01-11 DIAGNOSIS — I63.9 CEREBROVASCULAR ACCIDENT (CVA), UNSPECIFIED MECHANISM: Primary | ICD-10-CM

## 2023-01-11 PROCEDURE — 99214 OFFICE O/P EST MOD 30 MIN: CPT | Mod: S$PBB,,, | Performed by: PSYCHIATRY & NEUROLOGY

## 2023-01-11 PROCEDURE — 99214 OFFICE O/P EST MOD 30 MIN: CPT | Mod: PBBFAC | Performed by: PSYCHIATRY & NEUROLOGY

## 2023-01-11 PROCEDURE — 99214 PR OFFICE/OUTPT VISIT, EST, LEVL IV, 30-39 MIN: ICD-10-PCS | Mod: S$PBB,,, | Performed by: PSYCHIATRY & NEUROLOGY

## 2023-01-11 NOTE — PROGRESS NOTES
Subjective:       Patient ID: Steven Campo is a 67 y.o. male     Chief Complaint:    Chief Complaint   Patient presents with    Follow-up        Allergies:  Patient has no known allergies.    Current Medications:    Outpatient Encounter Medications as of 1/11/2023   Medication Sig Dispense Refill    albuterol-ipratropium (DUO-NEB) 2.5 mg-0.5 mg/3 mL nebulizer solution Take 3 mLs by nebulization every 6 (six) hours as needed for Wheezing or Shortness of Breath. Rescue 300 mL 0    aspirin 325 MG tablet Take 325 mg by mouth once daily.      atorvastatin (LIPITOR) 40 MG tablet Take 1 tablet (40 mg total) by mouth once daily. 90 tablet 3    budesonide (PULMICORT) 0.5 mg/2 mL nebulizer solution Take 2 mLs (0.5 mg total) by nebulization 2 (two) times daily. Controller 360 mL 1     No facility-administered encounter medications on file as of 1/11/2023.       History of Present Illness  68 yo Bm s/p stroke 2015 L MCA- recen t MRI brain showed nothing acute but old L MCA   Residual R hemiparesis w/ brachial predominance and dysarthria   Pt still smoking tobacco and noncompliant w/ ASPIRIN- ran out month or so ago?   Did not get EEG approved       Past Medical History:   Diagnosis Date    Chronic bilateral low back pain with right-sided sciatica     Chronic pain of right knee     Essential (primary) hypertension     Hypercholesteremia     Nicotine dependence, cigarettes, uncomplicated     Seizures 2020    last was approx 7 mths ago    Stroke 2015    with residual right sided weakness       Past Surgical History:   Procedure Laterality Date    KNEE ARTHROSCOPY Right 1995       Social History  Mr. Campo  reports that he has been smoking cigarettes. He has a 44.00 pack-year smoking history. He has never used smokeless tobacco. He reports current alcohol use.  Drug: Marijuana.    Family History  Mr.'s Campo family history includes Heart disease in his brother and mother; Hyperlipidemia in his sister, sister, sister, and  "sister; Hypertension in his brother, brother, mother, sister, sister, sister, sister, and sister; Kidney failure in his sister; Lung cancer in his father; No Known Problems in his daughter, daughter, maternal grandfather, maternal grandmother, paternal grandfather, paternal grandmother, and sister; Pancreatic cancer in his sister.    Review of Systems  Review of Systems   Neurological:  Positive for sensory change, focal weakness and weakness.   All other systems reviewed and are negative.   Objective:   /78 (BP Location: Left arm, Patient Position: Sitting, BP Method: Large (Manual))   Pulse 92   Ht 5' 11" (1.803 m)   Wt 63.5 kg (140 lb)   SpO2 96%   BMI 19.53 kg/m²    NEUROLOGICAL EXAMINATION:     MENTAL STATUS   Oriented to person, place, and time.   Level of consciousness: alert  Knowledge: consistent with education.     CRANIAL NERVES   Cranial nerves II through XII intact.     MOTOR EXAM        Residual R hemiparesis      GAIT AND COORDINATION        Can use a walker       Physical Exam  Vitals reviewed.   Constitutional:       Appearance: He is normal weight.   Neurological:      Mental Status: He is alert and oriented to person, place, and time. Mental status is at baseline.      Cranial Nerves: Cranial nerves 2-12 are intact.        Assessment:     Cerebrovascular accident (CVA), unspecified mechanism         Primary Diagnosis and ICD10  Cerebrovascular accident (CVA), unspecified mechanism [I63.9]    Plan:     There are no Patient Instructions on file for this visit.    There are no discontinued medications.    Requested Prescriptions      No prescriptions requested or ordered in this encounter       "

## 2023-01-30 ENCOUNTER — OFFICE VISIT (OUTPATIENT)
Dept: FAMILY MEDICINE | Facility: CLINIC | Age: 68
End: 2023-01-30
Payer: MEDICARE

## 2023-01-30 VITALS
DIASTOLIC BLOOD PRESSURE: 70 MMHG | TEMPERATURE: 98 F | BODY MASS INDEX: 19.6 KG/M2 | HEART RATE: 101 BPM | RESPIRATION RATE: 18 BRPM | HEIGHT: 71 IN | SYSTOLIC BLOOD PRESSURE: 138 MMHG | OXYGEN SATURATION: 96 % | WEIGHT: 140 LBS

## 2023-01-30 DIAGNOSIS — J44.89 CHRONIC BRONCHITIS WITH EMPHYSEMA: Chronic | ICD-10-CM

## 2023-01-30 DIAGNOSIS — F17.210 NICOTINE DEPENDENCE, CIGARETTES, UNCOMPLICATED: Chronic | ICD-10-CM

## 2023-01-30 DIAGNOSIS — D64.9 ANEMIA, UNSPECIFIED TYPE: ICD-10-CM

## 2023-01-30 DIAGNOSIS — E78.00 HYPERCHOLESTEREMIA: Primary | Chronic | ICD-10-CM

## 2023-01-30 DIAGNOSIS — R97.20 PSA ELEVATION: ICD-10-CM

## 2023-01-30 DIAGNOSIS — R91.8 MASS OF RIGHT LUNG: ICD-10-CM

## 2023-01-30 DIAGNOSIS — N18.32 STAGE 3B CHRONIC KIDNEY DISEASE: Chronic | ICD-10-CM

## 2023-01-30 DIAGNOSIS — I10 ESSENTIAL (PRIMARY) HYPERTENSION: Chronic | ICD-10-CM

## 2023-01-30 DIAGNOSIS — Z79.899 ENCOUNTER FOR LONG-TERM (CURRENT) USE OF OTHER MEDICATIONS: ICD-10-CM

## 2023-01-30 PROBLEM — J41.0 SIMPLE CHRONIC BRONCHITIS: Chronic | Status: ACTIVE | Noted: 2022-10-10

## 2023-01-30 LAB
ALBUMIN SERPL BCP-MCNC: 3 G/DL (ref 3.5–5)
ALBUMIN/GLOB SERPL: 0.6 {RATIO}
ALP SERPL-CCNC: 103 U/L (ref 45–115)
ALT SERPL W P-5'-P-CCNC: 16 U/L (ref 16–61)
ANION GAP SERPL CALCULATED.3IONS-SCNC: 11 MMOL/L (ref 7–16)
AST SERPL W P-5'-P-CCNC: 23 U/L (ref 15–37)
BASOPHILS # BLD AUTO: 0.06 K/UL (ref 0–0.2)
BASOPHILS NFR BLD AUTO: 0.5 % (ref 0–1)
BILIRUB SERPL-MCNC: 0.3 MG/DL (ref ?–1.2)
BUN SERPL-MCNC: 16 MG/DL (ref 7–18)
BUN/CREAT SERPL: 16 (ref 6–20)
CALCIUM SERPL-MCNC: 9.1 MG/DL (ref 8.5–10.1)
CHLORIDE SERPL-SCNC: 106 MMOL/L (ref 98–107)
CHOLEST SERPL-MCNC: 157 MG/DL (ref 0–200)
CHOLEST/HDLC SERPL: 2.1 {RATIO}
CO2 SERPL-SCNC: 25 MMOL/L (ref 21–32)
CREAT SERPL-MCNC: 1 MG/DL (ref 0.7–1.3)
DIFFERENTIAL METHOD BLD: ABNORMAL
EGFR (NO RACE VARIABLE) (RUSH/TITUS): 84 ML/MIN/1.73M²
EOSINOPHIL # BLD AUTO: 0.14 K/UL (ref 0–0.5)
EOSINOPHIL NFR BLD AUTO: 1.2 % (ref 1–4)
ERYTHROCYTE [DISTWIDTH] IN BLOOD BY AUTOMATED COUNT: 16.7 % (ref 11.5–14.5)
FERRITIN SERPL-MCNC: 569 NG/ML (ref 26–388)
GLOBULIN SER-MCNC: 4.7 G/DL (ref 2–4)
GLUCOSE SERPL-MCNC: 102 MG/DL (ref 74–106)
HCT VFR BLD AUTO: 37.5 % (ref 40–54)
HDLC SERPL-MCNC: 76 MG/DL (ref 40–60)
HGB BLD-MCNC: 12.3 G/DL (ref 13.5–18)
IMM GRANULOCYTES # BLD AUTO: 0.04 K/UL (ref 0–0.04)
IMM GRANULOCYTES NFR BLD: 0.3 % (ref 0–0.4)
IRON SATN MFR SERPL: 16 % (ref 14–50)
IRON SERPL-MCNC: 23 ΜG/DL (ref 65–175)
LDLC SERPL CALC-MCNC: 70 MG/DL
LYMPHOCYTES # BLD AUTO: 1.34 K/UL (ref 1–4.8)
LYMPHOCYTES NFR BLD AUTO: 11.1 % (ref 27–41)
MCH RBC QN AUTO: 27.8 PG (ref 27–31)
MCHC RBC AUTO-ENTMCNC: 32.8 G/DL (ref 32–36)
MCV RBC AUTO: 84.7 FL (ref 80–96)
MONOCYTES # BLD AUTO: 1.05 K/UL (ref 0–0.8)
MONOCYTES NFR BLD AUTO: 8.7 % (ref 2–6)
MPC BLD CALC-MCNC: 10.9 FL (ref 9.4–12.4)
NEUTROPHILS # BLD AUTO: 9.45 K/UL (ref 1.8–7.7)
NEUTROPHILS NFR BLD AUTO: 78.2 % (ref 53–65)
NONHDLC SERPL-MCNC: 81 MG/DL
NRBC # BLD AUTO: 0 X10E3/UL
NRBC, AUTO (.00): 0 %
PLATELET # BLD AUTO: 432 K/UL (ref 150–400)
POTASSIUM SERPL-SCNC: 3.8 MMOL/L (ref 3.5–5.1)
PROT SERPL-MCNC: 7.7 G/DL (ref 6.4–8.2)
PSA SERPL-MCNC: 10.1 NG/ML
RBC # BLD AUTO: 4.43 M/UL (ref 4.6–6.2)
SODIUM SERPL-SCNC: 138 MMOL/L (ref 136–145)
TIBC SERPL-MCNC: 147 ΜG/DL (ref 250–450)
TRIGL SERPL-MCNC: 55 MG/DL (ref 35–150)
VLDLC SERPL-MCNC: 11 MG/DL
WBC # BLD AUTO: 12.08 K/UL (ref 4.5–11)

## 2023-01-30 PROCEDURE — 99214 OFFICE O/P EST MOD 30 MIN: CPT | Mod: ,,, | Performed by: NURSE PRACTITIONER

## 2023-01-30 PROCEDURE — 83540 ASSAY OF IRON: CPT | Mod: ,,, | Performed by: CLINICAL MEDICAL LABORATORY

## 2023-01-30 PROCEDURE — 83550 IRON BINDING TEST: CPT | Mod: ,,, | Performed by: CLINICAL MEDICAL LABORATORY

## 2023-01-30 PROCEDURE — 83540 IRON AND TIBC: ICD-10-PCS | Mod: ,,, | Performed by: CLINICAL MEDICAL LABORATORY

## 2023-01-30 PROCEDURE — 99214 PR OFFICE/OUTPT VISIT, EST, LEVL IV, 30-39 MIN: ICD-10-PCS | Mod: ,,, | Performed by: NURSE PRACTITIONER

## 2023-01-30 PROCEDURE — 80053 COMPREHEN METABOLIC PANEL: CPT | Mod: ,,, | Performed by: CLINICAL MEDICAL LABORATORY

## 2023-01-30 PROCEDURE — 82728 FERRITIN: ICD-10-PCS | Mod: ,,, | Performed by: CLINICAL MEDICAL LABORATORY

## 2023-01-30 PROCEDURE — 85025 CBC WITH DIFFERENTIAL: ICD-10-PCS | Mod: ,,, | Performed by: CLINICAL MEDICAL LABORATORY

## 2023-01-30 PROCEDURE — 83550 IRON AND TIBC: ICD-10-PCS | Mod: ,,, | Performed by: CLINICAL MEDICAL LABORATORY

## 2023-01-30 PROCEDURE — 80053 COMPREHENSIVE METABOLIC PANEL: ICD-10-PCS | Mod: ,,, | Performed by: CLINICAL MEDICAL LABORATORY

## 2023-01-30 PROCEDURE — 84153 ASSAY OF PSA TOTAL: CPT | Mod: ,,, | Performed by: CLINICAL MEDICAL LABORATORY

## 2023-01-30 PROCEDURE — 82728 ASSAY OF FERRITIN: CPT | Mod: ,,, | Performed by: CLINICAL MEDICAL LABORATORY

## 2023-01-30 PROCEDURE — 80061 LIPID PANEL: ICD-10-PCS | Mod: ,,, | Performed by: CLINICAL MEDICAL LABORATORY

## 2023-01-30 PROCEDURE — 85025 COMPLETE CBC W/AUTO DIFF WBC: CPT | Mod: ,,, | Performed by: CLINICAL MEDICAL LABORATORY

## 2023-01-30 PROCEDURE — 80061 LIPID PANEL: CPT | Mod: ,,, | Performed by: CLINICAL MEDICAL LABORATORY

## 2023-01-30 PROCEDURE — 84153 PSA, TOTAL (DIAGNOSTIC): ICD-10-PCS | Mod: ,,, | Performed by: CLINICAL MEDICAL LABORATORY

## 2023-01-30 RX ORDER — ALBUTEROL SULFATE 90 UG/1
2 AEROSOL, METERED RESPIRATORY (INHALATION) EVERY 4 HOURS PRN
Qty: 18 G | Refills: 1 | Status: SHIPPED | OUTPATIENT
Start: 2023-01-30 | End: 2023-05-08 | Stop reason: SDUPTHER

## 2023-01-30 NOTE — PROGRESS NOTES
[facility] - FAMILY MEDICINE       PATIENT NAME: Steven Campo   : 1955    AGE: 67 y.o. DATE OF ENCOUNTER: 23    MRN: [id number]      PCP: JODY Jimenez    Reason for Visit / Chief Complaint:  COPD, Hyperlipidemia, and Follow-up (Patient presents to clinic with complaints COPD and HLD)         274}    Subjective:     HPI:    Presents for f/u HLD, HTN, & COPD.  Lung mass noted on CT 22 - has been referred to Pulm Dr. Nugent for further eval & tx plan.   Continues to smoke.  No wt loss, good appetite.  I see a note in the system today that says I was contacted & told he was a no show for appt 23 w/ Dr. Nugent. I did not personally receive this message and had no knowledge of this.  Will have him rescheduled for eval of lung mass.  Advised pt & sister of noting appt cancellation 22 - pt doesn't recall.  Sister reports having no knowledge of appt 23,    Uro appt 23 w/ Dr. Velasquez for elevated PSA.    Review of Systems:   Review of Systems   Constitutional: Negative.  Negative for appetite change.   HENT: Negative.     Eyes: Negative.    Respiratory:  Positive for cough and shortness of breath. Negative for wheezing.    Cardiovascular: Negative.    Gastrointestinal: Negative.    Endocrine: Negative.    Genitourinary: Negative.    Skin: Negative.    Allergic/Immunologic: Negative.    Neurological: Negative.    Hematological: Negative.    Psychiatric/Behavioral: Negative.       Allergies and Meds: 274}     Review of patient's allergies indicates:   Allergen Reactions    Salsalate      Other reaction(s): Abdominal pain    Tramadol      Other reaction(s): Seizure        Current Outpatient Medications:     albuterol-ipratropium (DUO-NEB) 2.5 mg-0.5 mg/3 mL nebulizer solution, Take 3 mLs by nebulization every 6 (six) hours as needed for Wheezing or Shortness of Breath. Rescue, Disp: 300 mL, Rfl: 0    aspirin 325 MG tablet, Take 325 mg by mouth once daily.,  Disp: , Rfl:     atorvastatin (LIPITOR) 40 MG tablet, Take 1 tablet (40 mg total) by mouth once daily., Disp: 90 tablet, Rfl: 3    budesonide (PULMICORT) 0.5 mg/2 mL nebulizer solution, Take 2 mLs (0.5 mg total) by nebulization 2 (two) times daily. Controller, Disp: 360 mL, Rfl: 1    albuterol (VENTOLIN HFA) 90 mcg/actuation inhaler, Inhale 2 puffs into the lungs every 4 (four) hours as needed for Wheezing or Shortness of Breath. Rescue, Disp: 18 g, Rfl: 1    Labs:274}    I have reviewed old labs below:  Lab Results   Component Value Date    WBC 10.34 11/30/2022    RBC 4.59 (L) 11/30/2022    HGB 12.8 (L) 11/30/2022    HCT 39.9 (L) 11/30/2022     (H) 11/30/2022     (L) 10/27/2022    K 4.3 10/27/2022     10/27/2022    CALCIUM 9.3 10/27/2022    GLU 92 10/27/2022    BUN 24 (H) 10/27/2022    CREATININE 1.46 (H) 10/27/2022    EGFRNONAA 40 (L) 07/21/2022    ALT 13 (L) 10/27/2022    AST 15 10/27/2022    CHOL 285 (H) 10/27/2022    TRIG 72 10/27/2022    HDL 76 (H) 10/27/2022    LDLCALC 195 10/27/2022    TSH 0.727 07/21/2022    PSA 5.910 (H) 10/27/2022       Medical History: 274}     Past Medical History:   Diagnosis Date    Chronic bilateral low back pain with right-sided sciatica     Chronic pain of right knee     Essential (primary) hypertension     Hypercholesteremia     Nicotine dependence, cigarettes, uncomplicated     Seizures 2020    last was approx 7 mths ago    Stroke 2015    with residual right sided weakness      Social History     Tobacco Use   Smoking Status Every Day    Packs/day: 1.00    Years: 44.00    Pack years: 44.00    Types: Cigarettes   Smokeless Tobacco Never      Past Surgical History:   Procedure Laterality Date    KNEE ARTHROSCOPY Right 1995        Health Maintenance: 274}     Health Maintenance         Date Due Completion Date    Influenza Vaccine (1) 06/30/2023 (Originally 9/1/2022) 2/24/2020    Shingles Vaccine (2 of 2) 10/27/2023 (Originally 9/30/2019) 8/5/2019    COVID-19  "Vaccine (1) 10/27/2023 (Originally 3/29/1956) ---    HIV Screening 07/27/2028 (Originally 9/29/1972) ---    TETANUS VACCINE 09/16/2023 9/16/2013    PROSTATE-SPECIFIC ANTIGEN 10/27/2023 10/27/2022    LDCT Lung Screen 11/18/2023 11/18/2022    High Dose Statin 01/11/2024 1/11/2023    Lipid Panel 10/27/2027 10/27/2022    Colorectal Cancer Screening 11/30/2032 11/30/2022            Objective:  274}   /70 (BP Location: Right arm, Patient Position: Sitting, BP Method: Large (Manual))   Pulse 101   Temp 98.4 °F (36.9 °C) (Oral)   Resp 18   Ht 5' 11" (1.803 m)   Wt 63.5 kg (140 lb)   SpO2 96%   BMI 19.53 kg/m²     Wt Readings from Last 3 Encounters:   01/30/23 63.5 kg (140 lb)   01/11/23 63.5 kg (140 lb)   11/30/22 63.5 kg (140 lb)     BP Readings from Last 3 Encounters:   01/30/23 138/70   01/11/23 132/78   11/30/22 (!) 165/104     Body mass index is 19.53 kg/m².     Physical Exam  Vitals and nursing note reviewed.   Constitutional:       General: He is not in acute distress.     Appearance: Normal appearance.   HENT:      Head: Normocephalic.   Eyes:      Conjunctiva/sclera: Conjunctivae normal.   Neck:      Thyroid: No thyromegaly or thyroid tenderness.      Trachea: Trachea normal.   Cardiovascular:      Rate and Rhythm: Normal rate and regular rhythm.      Pulses: Normal pulses.      Heart sounds: Normal heart sounds.   Pulmonary:      Effort: Pulmonary effort is normal. No respiratory distress.      Breath sounds: Rhonchi (R) present. No wheezing.   Musculoskeletal:      Cervical back: Neck supple.      Right lower leg: No edema.      Left lower leg: No edema.   Lymphadenopathy:      Cervical: No cervical adenopathy.   Skin:     General: Skin is warm and dry.   Neurological:      Mental Status: He is alert and oriented to person, place, and time.   Psychiatric:         Mood and Affect: Mood normal.         Behavior: Behavior normal.        Assessment and Plan: 274}     1. Hypercholesteremia  Comments:  F/u " labs today since resuming statin.  Orders:  -     Comprehensive Metabolic Panel; Future; Expected date: 01/30/2023  -     Lipid Panel; Future; Expected date: 01/30/2023    2. Essential (primary) hypertension  Comments:  Controlled, not currently on medication.    3. Stage 3b chronic kidney disease  Comments:  Stable  Orders:  -     Comprehensive Metabolic Panel; Future; Expected date: 01/30/2023    4. Mass of right lung  Comments:  Appt w/ Dr. Nugent 2/8/23 2:30 pm - given to sister while in office.  Overview:  CXR 9/21/22 [facility] - unusual R heart border, etiology uncertain, suggest CT imaging  CT Chest 11/18/22: Right hilar mass encasing the right bronchus intermedius concerning for malignancy.  Additional nodules in the right upper lung likely metastatic as well as right paratracheal adenopathy.  Biopsy recommended when feasible.      5. Anemia, unspecified type  -     CBC Auto Differential; Future; Expected date: 01/30/2023  -     Iron and TIBC; Future; Expected date: 01/30/2023  -     Ferritin; Future; Expected date: 01/30/2023    6. PSA elevation  Comments:  Advised sister she may need to adjust time of his Uro appointment on 02/08/2023 because they must keep appointment with Dr. Nugent.  Orders:  -     PSA, Total (Diagnostic); Future; Expected date: 01/30/2023    7. Encounter for long-term (current) use of other medications  -     Comprehensive Metabolic Panel; Future; Expected date: 01/30/2023    8. Nicotine dependence, cigarettes, uncomplicated  Comments:  Strongly encouraged smoking cessation.  Discussed that mass on CT is suspicious for malignancy.    9. Chronic bronchitis with emphysema  -     albuterol (VENTOLIN HFA) 90 mcg/actuation inhaler; Inhale 2 puffs into the lungs every 4 (four) hours as needed for Wheezing or Shortness of Breath. Rescue  Dispense: 18 g; Refill: 1        Return to clinic f/u HLD, COPD; and sooner as needed.    Future Appointments   Date Time Provider Department Center    2/8/2023  2:30 PM Benito Nugent MD [facility]  PULM [facility]   5/8/2023 11:00 AM JODY Jimenez [facility]N [place]   7/11/2023  9:30 AM Harsha Sanchez MD [facility] NEURO [place] [facility]   11/30/2023  7:00 AM [facility] GI ROOM 01 [facility]        Signature:  JODY Jimenez

## 2023-02-06 NOTE — PROGRESS NOTES
PSA is higher - send PSA results to Dr. Vito Velasquez for his appt this week (2/08).  Lipids are much better with atorvastatin 40 mg dly.  Anemia - low iron; take OTC ferrous sulfate 325 mg dly.

## 2023-02-08 ENCOUNTER — OFFICE VISIT (OUTPATIENT)
Dept: PULMONOLOGY | Facility: CLINIC | Age: 68
End: 2023-02-08
Payer: MEDICARE

## 2023-02-08 VITALS
DIASTOLIC BLOOD PRESSURE: 78 MMHG | WEIGHT: 140 LBS | SYSTOLIC BLOOD PRESSURE: 159 MMHG | HEIGHT: 71 IN | TEMPERATURE: 99 F | OXYGEN SATURATION: 99 % | BODY MASS INDEX: 19.6 KG/M2 | RESPIRATION RATE: 20 BRPM | HEART RATE: 94 BPM

## 2023-02-08 DIAGNOSIS — R91.8 MASS OF RIGHT LUNG: ICD-10-CM

## 2023-02-08 PROCEDURE — 99204 OFFICE O/P NEW MOD 45 MIN: CPT | Mod: S$PBB,,, | Performed by: INTERNAL MEDICINE

## 2023-02-08 PROCEDURE — 99204 PR OFFICE/OUTPT VISIT, NEW, LEVL IV, 45-59 MIN: ICD-10-PCS | Mod: S$PBB,,, | Performed by: INTERNAL MEDICINE

## 2023-02-08 PROCEDURE — 99215 OFFICE O/P EST HI 40 MIN: CPT | Mod: PBBFAC | Performed by: INTERNAL MEDICINE

## 2023-02-09 DIAGNOSIS — Z71.89 COMPLEX CARE COORDINATION: ICD-10-CM

## 2023-02-09 NOTE — PROGRESS NOTES
Subjective:       Patient ID: Steven Campo is a 67 y.o. male.    Chief Complaint: Abnormal Ct Scan (Mass of right lung ), Emphysema, and Shortness of Breath (With exertion )    Abnormal Ct Scan  This is a chronic problem. The current episode started more than 1 month ago. The problem has been unchanged. Pertinent negatives include no abdominal pain, arthralgias, chest pain, chills, congestion, headaches or rash.   Shortness of Breath  Pertinent negatives include no abdominal pain, chest pain, ear pain, headaches or rash.   Past Medical History:   Diagnosis Date    Chronic bilateral low back pain with right-sided sciatica     Chronic pain of right knee     Essential (primary) hypertension     Hypercholesteremia     Nicotine dependence, cigarettes, uncomplicated     Seizures 2020    last was approx 7 mths ago    Stroke 2015    with residual right sided weakness     Past Surgical History:   Procedure Laterality Date    KNEE ARTHROSCOPY Right 1995     Family History   Problem Relation Age of Onset    Hypertension Mother     Heart disease Mother         mild MI, later in life    Lung cancer Father     Pancreatic cancer Sister     Hyperlipidemia Sister     Hypertension Sister     Kidney failure Sister     Hypertension Sister     Hyperlipidemia Sister     Hypertension Sister     Hyperlipidemia Sister     Hypertension Sister     Hyperlipidemia Sister     Hypertension Sister     No Known Problems Sister     Heart disease Brother     Hypertension Brother     Hypertension Brother     No Known Problems Daughter     No Known Problems Daughter     No Known Problems Maternal Grandmother     No Known Problems Maternal Grandfather     No Known Problems Paternal Grandmother     No Known Problems Paternal Grandfather      Review of patient's allergies indicates:   Allergen Reactions    Salsalate      Other reaction(s): Abdominal pain    Tramadol      Other reaction(s): Seizure      Social History     Tobacco Use    Smoking status:  Every Day     Packs/day: 1.00     Years: 44.00     Pack years: 44.00     Types: Cigarettes    Smokeless tobacco: Never   Substance Use Topics    Alcohol use: Yes     Comment: approx 12 pk per week & fifth of vodka per week      Review of Systems   Constitutional:  Negative for chills, activity change and night sweats.   HENT:  Negative for congestion and ear pain.    Eyes:  Negative for redness and itching.   Respiratory:  Positive for shortness of breath.    Cardiovascular:  Negative for chest pain and palpitations.   Musculoskeletal:  Negative for arthralgias and back pain.   Skin:  Negative for rash.   Gastrointestinal:  Negative for abdominal pain and abdominal distention.   Neurological:  Negative for dizziness and headaches.   Hematological:  Negative for adenopathy. Does not bruise/bleed easily.   Psychiatric/Behavioral:  Negative for confusion. The patient is not nervous/anxious.      Objective:      Physical Exam   Constitutional: He is oriented to person, place, and time. He appears well-developed and well-nourished.   HENT:   Head: Normocephalic.   Nose: Nose normal.   Mouth/Throat: Oropharynx is clear and moist.   Neck: No JVD present. No thyromegaly present.   Cardiovascular: Normal rate, regular rhythm, normal heart sounds and intact distal pulses.   Pulmonary/Chest: Normal expansion, hyperinflation, symmetric chest wall expansion, effort normal and breath sounds normal.   Abdominal: Soft. Bowel sounds are normal.   Musculoskeletal:         General: Normal range of motion.      Cervical back: Normal range of motion and neck supple.   Lymphadenopathy: No supraclavicular adenopathy is present.     He has no cervical adenopathy.   Neurological: He is alert and oriented to person, place, and time. He has normal reflexes.   Skin: Skin is warm and dry.   Psychiatric: He has a normal mood and affect. His behavior is normal.   Personal Diagnostic Review  CT chest reviewed    No flowsheet data found.       Assessment:       1. Mass of right lung          Outpatient Encounter Medications as of 2/8/2023   Medication Sig Dispense Refill    albuterol (VENTOLIN HFA) 90 mcg/actuation inhaler Inhale 2 puffs into the lungs every 4 (four) hours as needed for Wheezing or Shortness of Breath. Rescue 18 g 1    albuterol-ipratropium (DUO-NEB) 2.5 mg-0.5 mg/3 mL nebulizer solution Take 3 mLs by nebulization every 6 (six) hours as needed for Wheezing or Shortness of Breath. Rescue 300 mL 0    aspirin 325 MG tablet Take 325 mg by mouth once daily.      atorvastatin (LIPITOR) 40 MG tablet Take 1 tablet (40 mg total) by mouth once daily. 90 tablet 3    budesonide (PULMICORT) 0.5 mg/2 mL nebulizer solution Take 2 mLs (0.5 mg total) by nebulization 2 (two) times daily. Controller 360 mL 1     No facility-administered encounter medications on file as of 2/8/2023.     No orders of the defined types were placed in this encounter.      Plan:       Problem List Items Addressed This Visit          Pulmonary    Mass of right lung     Patient with right lower lung mass needs bronchoscopy for diagnosis the differential diagnosis includes a malignant tumor versus benign tumor versus fungus versus TB.  My plan is to proceed with bronchoscopy for diagnosis informed consent obtained         Relevant Orders    BRONCHOSCOPY

## 2023-02-09 NOTE — ASSESSMENT & PLAN NOTE
Patient with right lower lung mass needs bronchoscopy for diagnosis the differential diagnosis includes a malignant tumor versus benign tumor versus fungus versus TB.  My plan is to proceed with bronchoscopy for diagnosis informed consent obtained

## 2023-02-16 ENCOUNTER — HOSPITAL ENCOUNTER (OUTPATIENT)
Dept: GASTROENTEROLOGY | Facility: HOSPITAL | Age: 68
Discharge: HOME | End: 2023-02-16
Attending: INTERNAL MEDICINE
Payer: MEDICARE

## 2023-02-16 VITALS
OXYGEN SATURATION: 98 % | BODY MASS INDEX: 20.92 KG/M2 | RESPIRATION RATE: 13 BRPM | WEIGHT: 150 LBS | HEART RATE: 83 BPM | SYSTOLIC BLOOD PRESSURE: 155 MMHG | DIASTOLIC BLOOD PRESSURE: 91 MMHG

## 2023-02-16 DIAGNOSIS — R91.8 MASS OF RIGHT LUNG: ICD-10-CM

## 2023-02-16 RX ORDER — LIDOCAINE HYDROCHLORIDE 10 MG/ML
1 INJECTION, SOLUTION EPIDURAL; INFILTRATION; INTRACAUDAL; PERINEURAL ONCE
Status: DISCONTINUED | OUTPATIENT
Start: 2023-02-16 | End: 2023-02-17 | Stop reason: HOSPADM

## 2023-02-16 RX ORDER — LIDOCAINE HYDROCHLORIDE 20 MG/ML
10 SOLUTION OROPHARYNGEAL ONCE
Status: DISCONTINUED | OUTPATIENT
Start: 2023-02-16 | End: 2023-02-17 | Stop reason: HOSPADM

## 2023-02-22 ENCOUNTER — HOSPITAL ENCOUNTER (OUTPATIENT)
Dept: GASTROENTEROLOGY | Facility: HOSPITAL | Age: 68
Discharge: HOME | End: 2023-02-22
Attending: INTERNAL MEDICINE
Payer: OTHER GOVERNMENT

## 2023-02-22 VITALS
SYSTOLIC BLOOD PRESSURE: 137 MMHG | OXYGEN SATURATION: 95 % | RESPIRATION RATE: 15 BRPM | HEART RATE: 93 BPM | DIASTOLIC BLOOD PRESSURE: 89 MMHG

## 2023-02-22 DIAGNOSIS — R91.8 MASS OF RIGHT LUNG: ICD-10-CM

## 2023-02-22 LAB
AFB SMEAR (FA): NORMAL
DIRECT PREP: NORMAL

## 2023-02-22 PROCEDURE — 88342 IMHCHEM/IMCYTCHM 1ST ANTB: CPT | Mod: 26,,, | Performed by: PATHOLOGY

## 2023-02-22 PROCEDURE — 31623 DX BRONCHOSCOPE/BRUSH: CPT

## 2023-02-22 PROCEDURE — 88341 IMHCHEM/IMCYTCHM EA ADD ANTB: CPT | Mod: 26,,, | Performed by: PATHOLOGY

## 2023-02-22 PROCEDURE — 31625 BRONCHOSCOPY W/BIOPSY(S): CPT

## 2023-02-22 PROCEDURE — 88160 CYTOPATH SMEAR OTHER SOURCE: CPT | Mod: 26,XU,, | Performed by: PATHOLOGY

## 2023-02-22 PROCEDURE — 31622 DX BRONCHOSCOPE/WASH: CPT

## 2023-02-22 PROCEDURE — 88160 NON-GYN CYTOLOGY: ICD-10-PCS | Mod: 26,XU,, | Performed by: PATHOLOGY

## 2023-02-22 PROCEDURE — G0500 MOD SEDAT ENDO SERVICE >5YRS: HCPCS

## 2023-02-22 PROCEDURE — 88108 CYTOPATH CONCENTRATE TECH: CPT | Mod: 26,,, | Performed by: PATHOLOGY

## 2023-02-22 PROCEDURE — 27201423 OPTIME MED/SURG SUP & DEVICES STERILE SUPPLY

## 2023-02-22 PROCEDURE — 31624 DX BRONCHOSCOPE/LAVAGE: CPT | Mod: RT

## 2023-02-22 PROCEDURE — 88305 TISSUE EXAM BY PATHOLOGIST: CPT | Mod: 26,XU,, | Performed by: PATHOLOGY

## 2023-02-22 PROCEDURE — 88305 TISSUE EXAM BY PATHOLOGIST: CPT | Mod: TC,MCY | Performed by: INTERNAL MEDICINE

## 2023-02-22 PROCEDURE — 88341 SURGICAL PATHOLOGY: ICD-10-PCS | Mod: 26,,, | Performed by: PATHOLOGY

## 2023-02-22 PROCEDURE — 87210 SMEAR WET MOUNT SALINE/INK: CPT | Performed by: INTERNAL MEDICINE

## 2023-02-22 PROCEDURE — 87186 SC STD MICRODIL/AGAR DIL: CPT | Performed by: INTERNAL MEDICINE

## 2023-02-22 PROCEDURE — 25000003 PHARM REV CODE 250: Performed by: INTERNAL MEDICINE

## 2023-02-22 PROCEDURE — 25000003 PHARM REV CODE 250

## 2023-02-22 PROCEDURE — 87116 MYCOBACTERIA CULTURE: CPT | Mod: 91 | Performed by: INTERNAL MEDICINE

## 2023-02-22 PROCEDURE — 88342 SURGICAL PATHOLOGY: ICD-10-PCS | Mod: 26,,, | Performed by: PATHOLOGY

## 2023-02-22 PROCEDURE — 63600175 PHARM REV CODE 636 W HCPCS: Performed by: INTERNAL MEDICINE

## 2023-02-22 PROCEDURE — 88305 TISSUE EXAM BY PATHOLOGIST: CPT | Mod: TC,SUR,59 | Performed by: INTERNAL MEDICINE

## 2023-02-22 PROCEDURE — 88363 SURGICAL PATHOLOGY: ICD-10-PCS | Mod: ,,, | Performed by: PATHOLOGY

## 2023-02-22 PROCEDURE — 87205 SMEAR GRAM STAIN: CPT | Performed by: INTERNAL MEDICINE

## 2023-02-22 PROCEDURE — 87102 FUNGUS ISOLATION CULTURE: CPT | Performed by: INTERNAL MEDICINE

## 2023-02-22 PROCEDURE — 88305 NON-GYN CYTOLOGY: ICD-10-PCS | Mod: 26,XU,, | Performed by: PATHOLOGY

## 2023-02-22 PROCEDURE — 88108 NON-GYN CYTOLOGY: ICD-10-PCS | Mod: 26,,, | Performed by: PATHOLOGY

## 2023-02-22 PROCEDURE — 88363 XM ARCHIVE TISSUE MOLEC ANAL: CPT | Mod: ,,, | Performed by: PATHOLOGY

## 2023-02-22 PROCEDURE — 87206 SMEAR FLUORESCENT/ACID STAI: CPT | Performed by: INTERNAL MEDICINE

## 2023-02-22 PROCEDURE — 87070 CULTURE OTHR SPECIMN AEROBIC: CPT | Mod: 91 | Performed by: INTERNAL MEDICINE

## 2023-02-22 RX ORDER — LIDOCAINE HYDROCHLORIDE 10 MG/ML
1 INJECTION, SOLUTION EPIDURAL; INFILTRATION; INTRACAUDAL; PERINEURAL ONCE
Status: COMPLETED | OUTPATIENT
Start: 2023-02-22 | End: 2023-02-22

## 2023-02-22 RX ORDER — LIDOCAINE HYDROCHLORIDE 20 MG/ML
10 SOLUTION OROPHARYNGEAL ONCE
Status: COMPLETED | OUTPATIENT
Start: 2023-02-22 | End: 2023-02-22

## 2023-02-22 RX ORDER — MEPERIDINE HYDROCHLORIDE 100 MG/ML
INJECTION INTRAMUSCULAR; INTRAVENOUS; SUBCUTANEOUS
Status: COMPLETED | OUTPATIENT
Start: 2023-02-22 | End: 2023-02-22

## 2023-02-22 RX ORDER — MIDAZOLAM HYDROCHLORIDE 1 MG/ML
INJECTION INTRAMUSCULAR; INTRAVENOUS
Status: COMPLETED | OUTPATIENT
Start: 2023-02-22 | End: 2023-02-22

## 2023-02-22 RX ADMIN — LIDOCAINE HYDROCHLORIDE 10 MG: 10 INJECTION, SOLUTION EPIDURAL; INFILTRATION; INTRACAUDAL; PERINEURAL at 07:02

## 2023-02-22 RX ADMIN — MIDAZOLAM HYDROCHLORIDE 2 MG: 1 INJECTION, SOLUTION INTRAMUSCULAR; INTRAVENOUS at 07:02

## 2023-02-22 RX ADMIN — LIDOCAINE HYDROCHLORIDE 10 ML: 20 SOLUTION ORAL; TOPICAL at 07:02

## 2023-02-22 RX ADMIN — Medication 25 MG: at 07:02

## 2023-02-22 NOTE — DISCHARGE SUMMARY
[facility] - Endoscopy  Discharge Note  Short Stay    BRONCHOSCOPY      OUTCOME: Patient tolerated treatment/procedure well without complication and is now ready for discharge.    DISPOSITION: Home or Self Care    FINAL DIAGNOSIS:  <principal problem not specified>    FOLLOWUP: In clinic    DISCHARGE INSTRUCTIONS:  No discharge procedures on file.     TIME SPENT ON DISCHARGE: 5 minutes  Dx right lung mass  Brush ,biopsy,wash done

## 2023-02-22 NOTE — DISCHARGE INSTRUCTIONS
Procedure Date  2/22/23     Impression  Overall Impression: bronchus intermedius obstructed     Recommendation  Follow up bronchoscopy is recommended.    NO DRIVING, OPERATING EQUIPMENT, OR SIGNING LEGAL DOCUMENTS FOR 24 HOURS.    NOTIFY  IF: INCREASED SHORTNESS OF BREATH, CHEST PAIN, CHILLS AND/OR FEVER, BLEEDING OVER 3 TABLESPOONS.     NOTHING TO EAT UNTIL 930AM TODAY DUE TO MEDICATION GIVEN DURING PROCEDURE.

## 2023-02-23 LAB
DHEA SERPL-MCNC: NORMAL
ESTROGEN SERPL-MCNC: NORMAL PG/ML
INSULIN SERPL-ACNC: NORMAL U[IU]/ML
INSULIN SERPL-ACNC: NORMAL U[IU]/ML
LAB AP GROSS DESCRIPTION: NORMAL
LAB AP GROSS DESCRIPTION: NORMAL
LAB AP LABORATORY NOTES: NORMAL
LAB AP LABORATORY NOTES: NORMAL
LAB AP SPECIMEN A NON-GYN GENERAL CATEGORIZATION: NEGATIVE
LAB AP SPECIMEN A NON-GYN INTERPETATION: NORMAL
LAB AP SPECIMEN B NON-GYN GENERAL CATEGORIZATION: POSITIVE
LAB AP SPECIMEN B NON-GYN INTERPRETATION: NORMAL
T3RU NFR SERPL: NORMAL %

## 2023-02-24 LAB
CULTURE, LOWER RESPIRATORY: ABNORMAL
GRAM STN SPEC: ABNORMAL
GRAM STN SPEC: ABNORMAL

## 2023-03-06 ENCOUNTER — OFFICE VISIT (OUTPATIENT)
Dept: PULMONOLOGY | Facility: CLINIC | Age: 68
End: 2023-03-06
Payer: MEDICARE

## 2023-03-06 VITALS
BODY MASS INDEX: 21 KG/M2 | HEIGHT: 71 IN | RESPIRATION RATE: 20 BRPM | DIASTOLIC BLOOD PRESSURE: 88 MMHG | OXYGEN SATURATION: 96 % | SYSTOLIC BLOOD PRESSURE: 150 MMHG | WEIGHT: 150 LBS | HEART RATE: 82 BPM | TEMPERATURE: 98 F

## 2023-03-06 DIAGNOSIS — R91.8 MASS OF RIGHT LUNG: Primary | ICD-10-CM

## 2023-03-06 DIAGNOSIS — C34.91 MALIGNANT NEOPLASM OF RIGHT LUNG, UNSPECIFIED PART OF LUNG: ICD-10-CM

## 2023-03-06 PROCEDURE — 99215 OFFICE O/P EST HI 40 MIN: CPT | Mod: PBBFAC | Performed by: INTERNAL MEDICINE

## 2023-03-06 PROCEDURE — 99214 PR OFFICE/OUTPT VISIT, EST, LEVL IV, 30-39 MIN: ICD-10-PCS | Mod: S$PBB,,, | Performed by: INTERNAL MEDICINE

## 2023-03-06 PROCEDURE — 99214 OFFICE O/P EST MOD 30 MIN: CPT | Mod: S$PBB,,, | Performed by: INTERNAL MEDICINE

## 2023-03-06 RX ORDER — ALBUTEROL SULFATE 5 MG/ML
0.5 SOLUTION RESPIRATORY (INHALATION)
COMMUNITY
End: 2023-05-08

## 2023-03-06 RX ORDER — ATORVASTATIN CALCIUM 40 MG/1
TABLET, FILM COATED ORAL
COMMUNITY
End: 2023-05-08 | Stop reason: SDUPTHER

## 2023-03-06 NOTE — PROGRESS NOTES
Subjective:       Patient ID: Steven Campo is a 67 y.o. male.    Chief Complaint: Follow-up    Follow-up  This is a chronic problem. The current episode started more than 1 month ago. The problem has been unchanged. Pertinent negatives include no abdominal pain, arthralgias, chest pain, chills, congestion, headaches or rash.   Past Medical History:   Diagnosis Date    Chronic bilateral low back pain with right-sided sciatica     Chronic pain of right knee     Essential (primary) hypertension     Hypercholesteremia     Nicotine dependence, cigarettes, uncomplicated     Seizures 2020    last was approx 7 mths ago    Stroke 2015    with residual right sided weakness     Past Surgical History:   Procedure Laterality Date    KNEE ARTHROSCOPY Right 1995     Family History   Problem Relation Age of Onset    Hypertension Mother     Heart disease Mother         mild MI, later in life    Lung cancer Father     Pancreatic cancer Sister     Hyperlipidemia Sister     Hypertension Sister     Kidney failure Sister     Hypertension Sister     Hyperlipidemia Sister     Hypertension Sister     Hyperlipidemia Sister     Hypertension Sister     Hyperlipidemia Sister     Hypertension Sister     No Known Problems Sister     Heart disease Brother     Hypertension Brother     Hypertension Brother     No Known Problems Daughter     No Known Problems Daughter     No Known Problems Maternal Grandmother     No Known Problems Maternal Grandfather     No Known Problems Paternal Grandmother     No Known Problems Paternal Grandfather      Review of patient's allergies indicates:   Allergen Reactions    Salsalate      Other reaction(s): Abdominal pain    Tramadol      Other reaction(s): Seizure      Social History     Tobacco Use    Smoking status: Every Day     Packs/day: 1.00     Years: 44.00     Pack years: 44.00     Types: Cigarettes    Smokeless tobacco: Never   Substance Use Topics    Alcohol use: Yes     Comment: approx 12 pk per week &  fifth of vodka per week      Review of Systems   Constitutional:  Negative for chills, activity change and night sweats.   HENT:  Negative for congestion and ear pain.    Eyes:  Negative for redness and itching.   Cardiovascular:  Negative for chest pain and palpitations.   Musculoskeletal:  Negative for arthralgias and back pain.   Skin:  Negative for rash.   Gastrointestinal:  Negative for abdominal pain and abdominal distention.   Neurological:  Negative for dizziness and headaches.   Hematological:  Negative for adenopathy. Does not bruise/bleed easily.   Psychiatric/Behavioral:  Negative for confusion. The patient is not nervous/anxious.      Objective:      Physical Exam   Constitutional: He is oriented to person, place, and time. He appears well-developed and well-nourished.   HENT:   Head: Normocephalic.   Nose: Nose normal.   Mouth/Throat: Oropharynx is clear and moist.   Neck: No JVD present. No thyromegaly present.   Cardiovascular: Normal rate, regular rhythm, normal heart sounds and intact distal pulses.   Pulmonary/Chest: Normal expansion, hyperinflation, symmetric chest wall expansion, effort normal and breath sounds normal.   Abdominal: Soft. Bowel sounds are normal.   Musculoskeletal:         General: Normal range of motion.      Cervical back: Normal range of motion and neck supple.   Lymphadenopathy: No supraclavicular adenopathy is present.     He has no cervical adenopathy.   Neurological: He is alert and oriented to person, place, and time. He has normal reflexes.   Skin: Skin is warm and dry.   Psychiatric: He has a normal mood and affect. His behavior is normal.   Personal Diagnostic Review  Adenocarcinoma of the lung    No flowsheet data found.      Assessment:       1. Mass of right lung    2. Malignant neoplasm of right lung, unspecified part of lung          Outpatient Encounter Medications as of 3/6/2023   Medication Sig Dispense Refill    albuterol (PROVENTIL) 5 mg/mL nebulizer solution  Inhale 0.5 mLs into the lungs.      albuterol (VENTOLIN HFA) 90 mcg/actuation inhaler Inhale 2 puffs into the lungs every 4 (four) hours as needed for Wheezing or Shortness of Breath. Rescue 18 g 1    albuterol-ipratropium (DUO-NEB) 2.5 mg-0.5 mg/3 mL nebulizer solution Take 3 mLs by nebulization every 6 (six) hours as needed for Wheezing or Shortness of Breath. Rescue 300 mL 0    aspirin 81 mg Cap       atorvastatin (LIPITOR) 40 MG tablet Take 1 tablet (40 mg total) by mouth once daily. 90 tablet 3    budesonide (PULMICORT) 0.5 mg/2 mL nebulizer solution Take 2 mLs (0.5 mg total) by nebulization 2 (two) times daily. Controller 360 mL 1    aspirin 325 MG tablet Take 325 mg by mouth once daily.      atorvastatin (LIPITOR) 40 MG tablet Take by mouth.       No facility-administered encounter medications on file as of 3/6/2023.     Orders Placed This Encounter   Procedures    Ambulatory referral/consult to Oncology     Standing Status:   Future     Standing Expiration Date:   4/6/2024     Referral Priority:   Routine     Referral Type:   Consultation     Referral Reason:   Specialty Services Required     Requested Specialty:   Oncology     Number of Visits Requested:   1       Plan:       Problem List Items Addressed This Visit          Pulmonary    Mass of right lung - Primary    Relevant Orders    Ambulatory referral/consult to Oncology       Oncology    Malignant neoplasm of right lung     Patient has a 4 x 3 cm lung mass with paratracheal adenopathy and pleural effusion as well satellite lesions this is not assessed at resectable lesions adenocarcinoma of the lung were referring to [facility].  Answered all her questions today.  I would like to add it is almost completely obstructs bronchus intermedius

## 2023-03-06 NOTE — ASSESSMENT & PLAN NOTE
Patient has a 4 x 3 cm lung mass with paratracheal adenopathy and pleural effusion as well satellite lesions this is not assessed at resectable lesions adenocarcinoma of the lung were referring to [facility].  Answered all her questions today.  I would like to add it is almost completely obstructs bronchus intermedius

## 2023-03-07 ENCOUNTER — TELEPHONE (OUTPATIENT)
Dept: PULMONOLOGY | Facility: CLINIC | Age: 68
End: 2023-03-07
Payer: OTHER GOVERNMENT

## 2023-03-07 NOTE — TELEPHONE ENCOUNTER
Ms. Dawn Dumont, Jeane VERA  Phone [phone]    MsSarahJulia consulted  about Mr.Alber for Abnormal CT Chest.  Call placed to  at MsMonroeJulia's office.  Information shared and Thank You for consult forwarded.    Follow-up gave Lung Cancer diagnosis:   Pt being referred to [facility], .

## 2023-04-02 LAB — CULTURE, FUNGUS (OTHER): NORMAL

## 2023-04-17 LAB — MYCOBACTERIUM SPEC CULT: NORMAL

## 2023-04-28 ENCOUNTER — TELEPHONE (OUTPATIENT)
Dept: FAMILY MEDICINE | Facility: CLINIC | Age: 68
End: 2023-04-28
Payer: OTHER GOVERNMENT

## 2023-04-28 NOTE — TELEPHONE ENCOUNTER
----- Message from Dandre Barcenas sent at 4/28/2023  9:48 AM CDT -----  ALBUTEROL+ (OTHER MED FOR THERE MACHINE),[facility] TO [facility] [place] [facility] [phone]

## 2023-05-08 ENCOUNTER — OFFICE VISIT (OUTPATIENT)
Dept: FAMILY MEDICINE | Facility: CLINIC | Age: 68
End: 2023-05-08
Payer: MEDICARE

## 2023-05-08 VITALS
TEMPERATURE: 98 F | DIASTOLIC BLOOD PRESSURE: 84 MMHG | RESPIRATION RATE: 20 BRPM | SYSTOLIC BLOOD PRESSURE: 123 MMHG | HEIGHT: 71 IN | OXYGEN SATURATION: 98 % | WEIGHT: 150 LBS | BODY MASS INDEX: 21 KG/M2 | HEART RATE: 107 BPM

## 2023-05-08 DIAGNOSIS — I10 ESSENTIAL (PRIMARY) HYPERTENSION: Chronic | ICD-10-CM

## 2023-05-08 DIAGNOSIS — N18.32 STAGE 3B CHRONIC KIDNEY DISEASE: Chronic | ICD-10-CM

## 2023-05-08 DIAGNOSIS — F17.210 NICOTINE DEPENDENCE, CIGARETTES, UNCOMPLICATED: Chronic | ICD-10-CM

## 2023-05-08 DIAGNOSIS — R56.9 SEIZURES: ICD-10-CM

## 2023-05-08 DIAGNOSIS — E78.00 HYPERCHOLESTEREMIA: Primary | Chronic | ICD-10-CM

## 2023-05-08 DIAGNOSIS — J44.89 CHRONIC BRONCHITIS WITH EMPHYSEMA: Chronic | ICD-10-CM

## 2023-05-08 PROCEDURE — 99214 OFFICE O/P EST MOD 30 MIN: CPT | Mod: ,,, | Performed by: NURSE PRACTITIONER

## 2023-05-08 PROCEDURE — 99214 PR OFFICE/OUTPT VISIT, EST, LEVL IV, 30-39 MIN: ICD-10-PCS | Mod: ,,, | Performed by: NURSE PRACTITIONER

## 2023-05-08 RX ORDER — ALBUTEROL SULFATE 90 UG/1
2 AEROSOL, METERED RESPIRATORY (INHALATION) EVERY 4 HOURS PRN
Qty: 54 G | Refills: 1 | Status: SHIPPED | OUTPATIENT
Start: 2023-05-08 | End: 2024-01-09 | Stop reason: SDUPTHER

## 2023-05-08 RX ORDER — BUDESONIDE 0.5 MG/2ML
0.5 INHALANT ORAL 2 TIMES DAILY
Qty: 360 ML | Refills: 1 | Status: SHIPPED | OUTPATIENT
Start: 2023-05-08 | End: 2023-05-10 | Stop reason: ALTCHOICE

## 2023-05-08 RX ORDER — ATORVASTATIN CALCIUM 40 MG/1
40 TABLET, FILM COATED ORAL DAILY
Qty: 90 TABLET | Refills: 3 | Status: SHIPPED | OUTPATIENT
Start: 2023-05-08 | End: 2024-05-07

## 2023-05-08 RX ORDER — IPRATROPIUM BROMIDE AND ALBUTEROL SULFATE 2.5; .5 MG/3ML; MG/3ML
3 SOLUTION RESPIRATORY (INHALATION) EVERY 6 HOURS PRN
Qty: 360 ML | Refills: 1 | Status: SHIPPED | OUTPATIENT
Start: 2023-05-08

## 2023-05-08 NOTE — PATIENT INSTRUCTIONS
Patient Education       Preventing Falls   The Basics   Written by the doctors and editors at [facility]   Am I at risk of falling? -- Your risk of falling increases as you grow older. That's because getting older can make it harder to walk steadily and keep your balance. Also, the effects of falls are more serious in older people.  Overall, 3 to 4 out of every 10 people over the age of 65 fall each year. Up to 75 percent of people who fracture a hip never recover to the point they were before they had their fracture. If you have fallen in the past, you are at higher risk of falling again.  Several things can increase your risk of a fall, including:  Illness  A change in the medicines you take  An unsafe or unfamiliar setting (for example, a room with rugs or furniture that might trip you, or an area you don't know well)  How can my doctor help me to avoid falling? -- Your doctor can talk to you about the following things:  Past falls - It is important to tell your doctor about any times you have fallen or almost fallen. He or she can then suggest ways to prevent another fall.  Your health conditions - Some health problems can put you at risk of falling. These include conditions that affect eyesight, hearing, muscle strength, or balance.  The medicines you take - Certain medicines can increase the risk of falling. These include some medicines that are used for sleeping problems, anxiety, high blood pressure, or depression. Adding new medicines, or changing doses of some medicines, can also affect your risk of falling.  The more your doctor knows about your situation, the better he or she will be able to help you. For example, if you fell because you have a condition that causes pain, your doctor might suggest treatments to deal with the pain. Or if one of your medicines is making you dizzy and more likely to fall, your doctor might switch you to a different medicine.  Is there anything I can do on my own? -- Yes. To  help keep from falling, you can:  Make your home safer - To avoid falling at home, get rid of things that might make you trip or slip. This might include furniture, electrical cords, clutter, and loose rugs (figure 1). Keep your home well-lit so that you can easily see where you are going. Avoid storing things in high places so you don't have to reach or climb.  Wear sturdy shoes that fit well - Wearing shoes with high heels or slippery soles, or shoes that are too loose, can lead to falls. Walking around in bare feet, or only socks, can also increase your risk of falling.  Take vitamin D pills - Taking vitamin D might lower the risk of falls in older people. This is because vitamin D helps make bones and muscles stronger. Your doctor can talk to you about whether you should take extra vitamin D, and how much.  Stay active - Exercising on a regular basis can help lower your risk of falling. It might also help prevent you from getting hurt if you do fall. It is best to do a few different activities that help with both strength and balance. There are many kinds of exercise that can be safe for older people. These include walking, swimming, and Ronnie Chi (a Chinese martial art that involves slow, gentle movements).  Use a cane, walker, and other safety devices - If your doctor recommends that you use a cane or walker, be sure that it's the right size and you know how to use it. There are other devices that might help you avoid falling, too. These include grab bars or a sturdy seat for the shower, non-slip bath mats, and hand rails or treads for the stairs (to prevent slipping).  If you worry that you could fall, there are also alarm buttons that let you call for help if you fall and can't get up.  What should I do if I fall? -- If you fall, see your doctor right away, even if you aren't hurt. Your doctor can try to figure out what caused you to fall, and how likely you are to fall again. He or she will do an exam and  talk to you about your health problems, medicines, and activities. Then he or she can suggest things you can do to avoid falling again.  Many older people have a hard time recovering after a fall. Doing things to prevent falling can help you to protect your health and independence.  All topics are updated as new evidence becomes available and our peer review process is complete.  This topic retrieved from [employer] on: Sep 21, 2021.  Topic 69424 Version 18.0  Release: 29.4.2 - C29.263  © 2021 UpToDate, Inc. and/or its affiliates. All rights reserved.  figure 1: How to avoid falling at home     This picture shows some of the things that can cause a fall in your home. Look around and remove any loose rugs, electrical cords, clutter, or furniture that could trip you.  Graphic 98907 Version 1.0    Consumer Information Use and Disclaimer   This information is not specific medical advice and does not replace information you receive from your health care provider. This is only a brief summary of general information. It does NOT include all information about conditions, illnesses, injuries, tests, procedures, treatments, therapies, discharge instructions or life-style choices that may apply to you. You must talk with your health care provider for complete information about your health and treatment options. This information should not be used to decide whether or not to accept your health care provider's advice, instructions or recommendations. Only your health care provider has the knowledge and training to provide advice that is right for you. The use of this information is governed by the [employer] End User License Agreement, available at [email or web address]/[email or web address].The use of [employer] content is governed by the [employer] Terms of Use. ©2021 UpToDate, Inc. All rights reserved.  Copyright   © 2021 UpToDate, Inc. and/or its affiliates. All rights reserved.

## 2023-05-08 NOTE — PROGRESS NOTES
[facility] - FAMILY MEDICINE       PATIENT NAME: Steven Campo   : 1955    AGE: 67 y.o. DATE OF ENCOUNTER: 23    MRN: [id number]      PCP: JODY Jimenez    Reason for Visit / Chief Complaint:  Hyperlipidemia and Follow-up (Patient presents to the clinic f/u cholesterol )         274}    Subjective:     HPI:    Presents with sister for f/u HLD.  [facility] [place]    Hx HTN but BP controlled now w/o med.  Hx chronic R sided weakness since stroke.     Followed by Oncology Dr. Vazquez for lung CA, will f/u 23 to see how things are progressing.  Pulmonologist, Dr. Nugent.    Review of Systems:   Review of Systems   Constitutional: Negative.  Negative for appetite change.   HENT: Negative.     Eyes: Negative.    Respiratory:  Positive for cough and shortness of breath (chronic). Negative for wheezing.    Cardiovascular: Negative.    Gastrointestinal: Negative.    Endocrine: Negative.    Genitourinary: Negative.    Skin: Negative.    Allergic/Immunologic: Negative.    Neurological: Negative.    Hematological: Negative.    Psychiatric/Behavioral: Negative.       Allergies and Meds: 274}     Review of patient's allergies indicates:   Allergen Reactions    Salsalate      Other reaction(s): Abdominal pain    Tramadol      Other reaction(s): Seizure        Current Outpatient Medications:     aspirin 81 mg Cap, , Disp: , Rfl:     albuterol (VENTOLIN HFA) 90 mcg/actuation inhaler, Inhale 2 puffs into the lungs every 4 (four) hours as needed for Wheezing or Shortness of Breath. Rescue, Disp: 54 g, Rfl: 1    albuterol-ipratropium (DUO-NEB) 2.5 mg-0.5 mg/3 mL nebulizer solution, Take 3 mLs by nebulization every 6 (six) hours as needed for Wheezing or Shortness of Breath. Rescue, Disp: 360 mL, Rfl: 1    atorvastatin (LIPITOR) 40 MG tablet, Take 1 tablet (40 mg total) by mouth once daily., Disp: 90 tablet, Rfl: 3    budesonide (PULMICORT) 0.5 mg/2 mL nebulizer  solution, Take 2 mLs (0.5 mg total) by nebulization 2 (two) times daily. Controller, Disp: 360 mL, Rfl: 1    Labs:274}    I have reviewed old labs below:  Lab Results   Component Value Date    WBC 12.08 (H) 01/30/2023    RBC 4.43 (L) 01/30/2023    HGB 12.3 (L) 01/30/2023    HCT 37.5 (L) 01/30/2023     (H) 01/30/2023     01/30/2023    K 3.8 01/30/2023     01/30/2023    CALCIUM 9.1 01/30/2023     01/30/2023    BUN 16 01/30/2023    CREATININE 1.00 01/30/2023    EGFRNONAA 40 (L) 07/21/2022    ALT 16 01/30/2023    AST 23 01/30/2023    CHOL 157 01/30/2023    TRIG 55 01/30/2023    HDL 76 (H) 01/30/2023    LDLCALC 70 01/30/2023    TSH 0.727 07/21/2022    PSA 10.100 (H) 01/30/2023       Medical History: 274}     Past Medical History:   Diagnosis Date    Adenocarcinoma of lung     Dx 2/22/23    Chronic bilateral low back pain with right-sided sciatica     Chronic pain of right knee     Essential (primary) hypertension     Hypercholesteremia     Nicotine dependence, cigarettes, uncomplicated     Seizures 2020    last was approx 7 mths ago    Stroke 2015    with residual right sided weakness      Social History     Tobacco Use   Smoking Status Every Day    Packs/day: 1.00    Years: 44.00    Pack years: 44.00    Types: Cigarettes   Smokeless Tobacco Never      Health Maintenance: 274}     Health Maintenance         Date Due Completion Date    Shingles Vaccine (2 of 2) 10/27/2023 (Originally 9/30/2019) 8/5/2019    COVID-19 Vaccine (1) 10/27/2023 (Originally 3/29/1956) ---    HIV Screening 07/27/2028 (Originally 9/29/1970) ---    Influenza Vaccine (Season Ended) 09/01/2023 2/24/2020    TETANUS VACCINE 09/16/2023 9/16/2013    PROSTATE-SPECIFIC ANTIGEN 01/30/2024 1/30/2023    High Dose Statin 03/06/2024 3/6/2023    Lipid Panel 01/30/2028 1/30/2023    Colorectal Cancer Screening 11/30/2032 11/30/2022          Objective:  274}   /84 (BP Location: Left arm, Patient Position: Sitting, BP Method: Medium  "(Automatic))   Pulse 107   Temp 97.8 °F (36.6 °C) (Oral)   Resp 20   Ht 5' 11" (1.803 m)   Wt 68 kg (150 lb)   SpO2 98%   BMI 20.92 kg/m²     Wt Readings from Last 3 Encounters:   05/08/23 68 kg (150 lb)   03/06/23 68 kg (150 lb)   02/16/23 68 kg (150 lb)     BP Readings from Last 3 Encounters:   05/08/23 123/84   03/06/23 (!) 150/88   02/22/23 137/89     Body mass index is 20.92 kg/m².     Physical Exam  Vitals and nursing note reviewed.   Constitutional:       General: He is not in acute distress.     Appearance: Normal appearance.   HENT:      Head: Normocephalic.   Eyes:      Conjunctiva/sclera: Conjunctivae normal.   Neck:      Thyroid: No thyromegaly or thyroid tenderness.      Trachea: Trachea normal.   Cardiovascular:      Rate and Rhythm: Normal rate and regular rhythm.      Pulses: Normal pulses.      Heart sounds: Normal heart sounds.   Pulmonary:      Effort: Pulmonary effort is normal. No respiratory distress.      Breath sounds: Rhonchi (R) present. No wheezing.   Musculoskeletal:      Right lower leg: No edema.      Left lower leg: No edema.   Skin:     General: Skin is warm and dry.   Neurological:      Mental Status: He is alert and oriented to person, place, and time.      Motor: Weakness (RUE & RLE) present.      Gait: Gait abnormal (using walker).   Psychiatric:         Mood and Affect: Mood normal.         Behavior: Behavior normal.        Assessment and Plan: 274}     1. Hypercholesteremia  Comments:  controlled, continue statin  Orders:  -     atorvastatin (LIPITOR) 40 MG tablet; Take 1 tablet (40 mg total) by mouth once daily.  Dispense: 90 tablet; Refill: 3    2. Essential (primary) hypertension  Comments:  controlled w/o med    3. Stage 3b chronic kidney disease  Comments:  stable    4. Chronic bronchitis with emphysema  -     budesonide (PULMICORT) 0.5 mg/2 mL nebulizer solution; Take 2 mLs (0.5 mg total) by nebulization 2 (two) times daily. Controller  Dispense: 360 mL; Refill: " 1  -     albuterol-ipratropium (DUO-NEB) 2.5 mg-0.5 mg/3 mL nebulizer solution; Take 3 mLs by nebulization every 6 (six) hours as needed for Wheezing or Shortness of Breath. Rescue  Dispense: 360 mL; Refill: 1  -     albuterol (VENTOLIN HFA) 90 mcg/actuation inhaler; Inhale 2 puffs into the lungs every 4 (four) hours as needed for Wheezing or Shortness of Breath. Rescue  Dispense: 54 g; Refill: 1    5. Seizures  Assessment & Plan:  None in 2 yrs, followed by Neuro.      6. Nicotine dependence, cigarettes, uncomplicated  Comments:  Advised smoking cessation.      Return to clinic 6 mths for HLD, CKD, w/ fasting labs; and sooner as needed.    Future Appointments   Date Time Provider Department Center   5/24/2023 10:00 AM AW[facility] NURSE, [facility] FAMILY MEDICINE [facility] [place]   7/11/2023  9:30 AM Harsha Sanchez MD [facility] NEURO [facility]   11/9/2023  7:40 AM JODY Jimenez [facility] [place]   11/30/2023  7:00 AM [facility] GI ROOM 01 [facility]        Signature:  JODY Jimenez

## 2023-05-10 ENCOUNTER — TELEPHONE (OUTPATIENT)
Dept: FAMILY MEDICINE | Facility: CLINIC | Age: 68
End: 2023-05-10
Payer: OTHER GOVERNMENT

## 2023-05-10 DIAGNOSIS — J44.89 CHRONIC BRONCHITIS WITH EMPHYSEMA: Primary | Chronic | ICD-10-CM

## 2023-05-10 RX ORDER — FLUTICASONE PROPIONATE AND SALMETEROL XINAFOATE 230; 21 UG/1; UG/1
2 AEROSOL, METERED RESPIRATORY (INHALATION) 2 TIMES DAILY
Qty: 36 G | Refills: 3 | Status: SHIPPED | OUTPATIENT
Start: 2023-05-10 | End: 2023-05-11 | Stop reason: ALTCHOICE

## 2023-05-10 NOTE — TELEPHONE ENCOUNTER
I tried to put the mometasone HFA in, but I got a pop-up that said it was not covered and prompted me to change it to fluticasone salmeterol HFA which it said was tier 1.  I hope this works.

## 2023-05-10 NOTE — TELEPHONE ENCOUNTER
[facility] not [facility] pulmicort [facility] formulary can we change Mometasone HFA. Will require new prescription if changed    [phone] (Sindy)

## 2023-05-11 NOTE — TELEPHONE ENCOUNTER
[facility] is asking [facility] we can print a prescription for mometasone HFA and fax it to them. ([phone])  Fluticasone salmeterol HFA is not preferrred

## 2023-05-11 NOTE — TELEPHONE ENCOUNTER
At this point, everyone has left the clinic so no I cannot and I won't be back until Monday.  I will try to send it again and see if it will let me over ride when I get the pop up. I do not feel this is best treatment since he has COPD with emphysema not asthma, but I feel like they have tied my hands w/ no other covered treatment.

## 2023-05-17 NOTE — PROGRESS NOTES
[facility]     PATIENT NAME: Steven Campo   : 1955    AGE: 67 y.o. DATE: 2023   MRN: [id number]        Reason for Visit / Chief Complaint: Medicare AWV (Subsequent Medicare AWV visit )        Steven Campo presents for a Subsequent  Medicare AWV today.    Review of Systems   Constitutional:  Positive for malaise/fatigue.   Respiratory:  Positive for cough and shortness of breath.    Cardiovascular: Negative.    Gastrointestinal: Negative.    Musculoskeletal:  Positive for back pain and joint pain.   Skin: Negative.    Neurological:  Positive for weakness. Negative for loss of consciousness and headaches.   Psychiatric/Behavioral: Negative.        The following components were reviewed and updated:      Medical/Social/Family History:  Past Medical History:   Diagnosis Date    Adenocarcinoma of lung     Dx 23    Chronic bilateral low back pain with right-sided sciatica     Chronic pain of right knee     Essential (primary) hypertension     Hypercholesteremia     Nicotine dependence, cigarettes, uncomplicated     Seizures     last was approx 7 mths ago    Stroke     with residual right sided weakness        Family History   Problem Relation Age of Onset    Hypertension Mother     Heart disease Mother         mild MI, later in life    Lung cancer Father     Pancreatic cancer Sister     Hyperlipidemia Sister     Hypertension Sister     Kidney failure Sister     Hypertension Sister     Hyperlipidemia Sister     Hypertension Sister     Hyperlipidemia Sister     Hypertension Sister     Hyperlipidemia Sister     Hypertension Sister     No Known Problems Sister     Heart disease Brother     Hypertension Brother     Hypertension Brother     No Known Problems Daughter     No Known Problems Daughter     No Known Problems Maternal Grandmother     No Known Problems Maternal Grandfather     No Known Problems Paternal Grandmother     No Known Problems Paternal Grandfather          Past Surgical History:   Procedure Laterality Date    KNEE ARTHROSCOPY Right 1995       Social History     Tobacco Use   Smoking Status Every Day    Packs/day: 1.00    Years: 44.00    Pack years: 44.00    Types: Cigarettes    Passive exposure: Current   Smokeless Tobacco Never       Social History     Substance and Sexual Activity   Alcohol Use Yes    Comment: approx 12 pk per week & fifth of vodka per week         Allergies and Current Medications     Review of patient's allergies indicates:   Allergen Reactions    Salsalate      Other reaction(s): Abdominal pain    Tramadol      Other reaction(s): Seizure       Current Outpatient Medications:     albuterol (VENTOLIN HFA) 90 mcg/actuation inhaler, Inhale 2 puffs into the lungs every 4 (four) hours as needed for Wheezing or Shortness of Breath. Rescue, Disp: 54 g, Rfl: 1    albuterol-ipratropium (DUO-NEB) 2.5 mg-0.5 mg/3 mL nebulizer solution, Take 3 mLs by nebulization every 6 (six) hours as needed for Wheezing or Shortness of Breath. Rescue, Disp: 360 mL, Rfl: 1    aspirin 81 mg Cap, , Disp: , Rfl:     atorvastatin (LIPITOR) 40 MG tablet, Take 1 tablet (40 mg total) by mouth once daily., Disp: 90 tablet, Rfl: 3    finasteride (PROSCAR) 5 mg tablet, Take 5 mg by mouth., Disp: , Rfl:     mometasone (ASMANEX HFA) 200 mcg/actuation HFAA, Inhale 2 puffs into the lungs 2 (two) times a day. Controller, Disp: 39 g, Rfl: 3    ondansetron (ZOFRAN) 8 MG tablet, Take by mouth every 8 (eight) hours as needed for Nausea., Disp: , Rfl:     prochlorperazine (COMPAZINE) 5 MG tablet, Take 5 mg by mouth every 6 (six) hours as needed for Nausea., Disp: , Rfl:     amLODIPine (NORVASC) 5 MG tablet, Take 1 tablet (5 mg total) by mouth once daily., Disp: 90 tablet, Rfl: 0      Health Risk Assessment   Fall Risk:  at risk for falls uses walker   Obesity: BMI Body mass index is 19.94 kg/m².   Advance Directive: no but information given   Depression: PHQ9- 5   HTN: , DASH diet,  exercise, weight management, med compliance, home BP monitoring, and follow-up discussed.   T2DM: no  STI: not at risk   Statin Use: yes      Health Maintenance   Last eye exam: referral ordered this visit   Last CV screen with lipids: 23   Diabetes screening with fasting glucose or A1c: 23   Colonoscopy: 22 Dr. Hammond -repeat in 1 year   Flu Vaccine: PANTERA faxed to [facility] -end of season   Pneumonia vaccines: 10/27/22   COVID vaccine: PANTERA faxed to [facility] and [facility].   Hep B vaccine: na   DEXA: na   Last PSA screen: 23   AAA screenin22   HIV Screening: drawn this visit  Hepatitis C Screen: 22 non-reactive  Low Dose CT Scan: 22    Health Maintenance Topics with due status: Not Due       Topic Last Completion Date    TETANUS VACCINE 2013    Influenza Vaccine 2020    Colorectal Cancer Screening 2022    PROSTATE-SPECIFIC ANTIGEN 2023    Lipid Panel 2023    High Dose Statin 2023     There are no preventive care reminders to display for this patient.      Lab results available in Epic or see dates from [facility] above:   Lab Results   Component Value Date    CHOL 157 2023    CHOL 285 (H) 10/27/2022    CHOL 211 (H) 2022     Lab Results   Component Value Date    HDL 76 (H) 2023    HDL 76 (H) 10/27/2022    HDL 80 (H) 2022     Lab Results   Component Value Date    LDLCALC 70 2023    LDLCALC 195 10/27/2022    LDLCALC 110 2022     Lab Results   Component Value Date    TRIG 55 2023    TRIG 72 10/27/2022    TRIG 103 2022     Lab Results   Component Value Date    CHOLHDL 2.1 2023    CHOLHDL 3.8 10/27/2022    CHOLHDL 2.6 2022       No results found for: LABA1C, HGBA1C    Sodium   Date Value Ref Range Status   2023 138 136 - 145 mmol/L Final     Potassium   Date Value Ref Range Status   2023 3.8 3.5 - 5.1 mmol/L Final     Chloride   Date Value Ref Range Status    01/30/2023 106 98 - 107 mmol/L Final     CO2   Date Value Ref Range Status   01/30/2023 25 21 - 32 mmol/L Final     Glucose   Date Value Ref Range Status   01/30/2023 102 74 - 106 mg/dL Final     BUN   Date Value Ref Range Status   01/30/2023 16 7 - 18 mg/dL Final     Creatinine   Date Value Ref Range Status   01/30/2023 1.00 0.70 - 1.30 mg/dL Final     Calcium   Date Value Ref Range Status   01/30/2023 9.1 8.5 - 10.1 mg/dL Final     Total Protein   Date Value Ref Range Status   01/30/2023 7.7 6.4 - 8.2 g/dL Final     Albumin   Date Value Ref Range Status   01/30/2023 3.0 (L) 3.5 - 5.0 g/dL Final     Bilirubin, Total   Date Value Ref Range Status   01/30/2023 0.3 >0.0 - 1.2 mg/dL Final     Alk Phos   Date Value Ref Range Status   01/30/2023 103 45 - 115 U/L Final     AST   Date Value Ref Range Status   01/30/2023 23 15 - 37 U/L Final     ALT   Date Value Ref Range Status   01/30/2023 16 16 - 61 U/L Final     Anion Gap   Date Value Ref Range Status   01/30/2023 11 7 - 16 mmol/L Final     eGFR   Date Value Ref Range Status   07/21/2022 40 (L) >=60 mL/min/1.73m² Final         Lab Results   Component Value Date    PSA 10.100 (H) 01/30/2023    PSA 5.910 (H) 10/27/2022    PSA 6.500 (H) 07/21/2022         Incontinence  Bowel: no  Bladder: no      Care Team  YANETH Dumont FNP -PCP                 Dr. Velasquez - urology                 Dr. Vazquez- oncology                            Dr. Sanchez- neurology  **See Completed Assessments for Annual Wellness visit within the encounter summary    The following assessments were completed & reviewed:  Depression Screening  Cognitive function Screening  Timed Get Up Test  Whisper Test  Vision Screen  Health Risk Assessment  Checklist of ADLs and IADLs        Objective  Vitals:    05/24/23 1045 05/24/23 1050 05/24/23 1127   BP: (!) 150/98 (!) 154/90 (!) 150/103   Pulse: (!) 112     Resp: 18     Temp: 98.3 °F (36.8 °C)     TempSrc: Oral     SpO2: 98%     Weight: 64.9 kg (143 lb)    "  Height: 5' 11" (1.803 m)     PainSc: 0-No pain        Body mass index is 19.94 kg/m².  Ideal body weight: 75.3 kg (166 lb 0.1 oz)       Physical Exam  Vitals and nursing note reviewed.   Constitutional:       General: He is not in acute distress.     Appearance: Normal appearance.   HENT:      Head: Normocephalic.   Eyes:      Conjunctiva/sclera: Conjunctivae normal.   Cardiovascular:      Rate and Rhythm: Normal rate and regular rhythm.      Heart sounds: Normal heart sounds.   Pulmonary:      Effort: Pulmonary effort is normal. No respiratory distress.      Breath sounds: Wheezing and rhonchi present.   Musculoskeletal:      Cervical back: Neck supple.   Skin:     General: Skin is warm and dry.   Neurological:      Mental Status: He is alert and oriented to person, place, and time.      Motor: Weakness present.      Gait: Gait abnormal.         Assessment:   1. Encounter for subsequent annual wellness visit (AWV) in Medicare patient    2. Essential (primary) hypertension  Comments:  Not controlled, start amlodipine.    F/u 4-6 weeks uncontrolled HTN.    3. Hypercholesteremia  Comments:  Controlled, continue atorvastatin.    4. Stage 3b chronic kidney disease  Comments:  Stable, continue monitoring.    5. Seizures  Comments:  Stable, followed by neuro.    6. History of stroke  Comments:  Followed by neuro.  Overview:  with residual right sided weakness      7. Refraction disorder    8. Screening for HIV (human immunodeficiency virus)  -     HIV 1/2 Ag/Ab (4th Gen); Future; Expected date: 05/24/2023    9. Malignant neoplasm of right lung, unspecified part of lung  Comments:  On chemo, followed by Oncology and pulmonology.    10. Nicotine dependence, cigarettes, uncomplicated  Comments:  Advised smoking cessation.         Plan:    Rx sent to the medical store for new rolling walker with seat.    Completed form given to patient and his sister to obtain handicap parking permit.    Discussed and provided with a " screening schedule and personal prevention plan in accordance with USPSTF age appropriate recommendations and Medicare screening guidelines.   Education, counseling, and referrals were provided as needed.  After Visit Summary printed and given to patient which includes written education and a list of any referrals if indicated.     Education including diet, exercise, falls, preventive health care for older adults, smoking cessation, BMI and advanced directives discussed with patient and patient verbalized understanding.      F/u plan for yearly AWV.  F/u with PCP in 4-6 weeks for uncontrolled HTN.    Signature:  Dawn VERA

## 2023-05-24 ENCOUNTER — TELEPHONE (OUTPATIENT)
Dept: FAMILY MEDICINE | Facility: CLINIC | Age: 68
End: 2023-05-24
Payer: OTHER GOVERNMENT

## 2023-05-24 ENCOUNTER — OFFICE VISIT (OUTPATIENT)
Dept: FAMILY MEDICINE | Facility: CLINIC | Age: 68
End: 2023-05-24
Payer: MEDICARE

## 2023-05-24 VITALS
WEIGHT: 143 LBS | TEMPERATURE: 98 F | HEIGHT: 71 IN | DIASTOLIC BLOOD PRESSURE: 103 MMHG | OXYGEN SATURATION: 98 % | BODY MASS INDEX: 20.02 KG/M2 | RESPIRATION RATE: 18 BRPM | SYSTOLIC BLOOD PRESSURE: 150 MMHG | HEART RATE: 112 BPM

## 2023-05-24 DIAGNOSIS — H52.7 REFRACTION DISORDER: ICD-10-CM

## 2023-05-24 DIAGNOSIS — C34.91 MALIGNANT NEOPLASM OF RIGHT LUNG, UNSPECIFIED PART OF LUNG: Chronic | ICD-10-CM

## 2023-05-24 DIAGNOSIS — Z11.4 SCREENING FOR HIV (HUMAN IMMUNODEFICIENCY VIRUS): ICD-10-CM

## 2023-05-24 DIAGNOSIS — F17.210 NICOTINE DEPENDENCE, CIGARETTES, UNCOMPLICATED: Chronic | ICD-10-CM

## 2023-05-24 DIAGNOSIS — Z00.00 ENCOUNTER FOR SUBSEQUENT ANNUAL WELLNESS VISIT (AWV) IN MEDICARE PATIENT: Primary | ICD-10-CM

## 2023-05-24 DIAGNOSIS — I10 ESSENTIAL (PRIMARY) HYPERTENSION: Primary | Chronic | ICD-10-CM

## 2023-05-24 DIAGNOSIS — I10 ESSENTIAL (PRIMARY) HYPERTENSION: Chronic | ICD-10-CM

## 2023-05-24 DIAGNOSIS — N18.32 STAGE 3B CHRONIC KIDNEY DISEASE: Chronic | ICD-10-CM

## 2023-05-24 DIAGNOSIS — R56.9 SEIZURES: Chronic | ICD-10-CM

## 2023-05-24 DIAGNOSIS — E78.00 HYPERCHOLESTEREMIA: Chronic | ICD-10-CM

## 2023-05-24 DIAGNOSIS — Z86.73 HISTORY OF STROKE: Chronic | ICD-10-CM

## 2023-05-24 PROCEDURE — G0402 PR WELCOME MEDICARE PREVENTIVE VISIT NEW ENROLLEE: ICD-10-PCS | Mod: ,,, | Performed by: NURSE PRACTITIONER

## 2023-05-24 PROCEDURE — G0402 INITIAL PREVENTIVE EXAM: HCPCS | Mod: ,,, | Performed by: NURSE PRACTITIONER

## 2023-05-24 RX ORDER — PROCHLORPERAZINE MALEATE 5 MG
5 TABLET ORAL EVERY 6 HOURS PRN
COMMUNITY
End: 2023-07-18

## 2023-05-24 RX ORDER — AMLODIPINE BESYLATE 5 MG/1
5 TABLET ORAL DAILY
Qty: 90 TABLET | Refills: 0 | Status: SHIPPED | OUTPATIENT
Start: 2023-05-24 | End: 2023-07-18 | Stop reason: SDUPTHER

## 2023-05-24 RX ORDER — FINASTERIDE 5 MG/1
5 TABLET, FILM COATED ORAL DAILY
COMMUNITY
Start: 2023-05-18

## 2023-05-24 RX ORDER — ONDANSETRON HYDROCHLORIDE 8 MG/1
TABLET, FILM COATED ORAL EVERY 8 HOURS PRN
COMMUNITY
End: 2023-07-18

## 2023-05-24 NOTE — LETTER
AUTHORIZATION FOR RELEASE OF   CONFIDENTIAL INFORMATION    Dear [facility],    We are seeing Steven Campo, date of birth 1955, in the clinic at [facility]. JODY Jimenez is the patient's PCP. Steven Campo has an outstanding lab/procedure at the time we reviewed his chart. In order to help keep his health information updated, he has authorized us to request the following medical record(s):        (  )  MAMMOGRAM                                      (  )  COLONOSCOPY      (  )  PAP SMEAR                                          (  )  OUTSIDE LAB RESULTS     (  )  DEXA SCAN                                          (  )  EYE EXAM            (  )  FOOT EXAM                                          (  )  ENTIRE RECORD     (X  )  OUTSIDE IMMUNIZATIONS                 (  )  _______________         Please fax records to [facility], Jennifer P Lafferty, FNP, [phone]     If you have any questions, please contact  at [phone].         Patient Name: Steven Campo  : 1955  Patient Phone #: [phone]

## 2023-05-24 NOTE — LETTER
AUTHORIZATION FOR RELEASE OF   CONFIDENTIAL INFORMATION    Dear [facility]    We are seeing Steven Campo, date of birth 1955, in the clinic at [facility]. JODY Jimenez is the patient's PCP. Steven Campo has an outstanding lab/procedure at the time we reviewed his chart. In order to help keep his health information updated, he has authorized us to request the following medical record(s):        (  )  MAMMOGRAM                                      (  )  COLONOSCOPY      (  )  PAP SMEAR                                          (  )  OUTSIDE LAB RESULTS     (  )  DEXA SCAN                                          (  )  EYE EXAM            (  )  FOOT EXAM                                          (  )  ENTIRE RECORD     ( X )  OUTSIDE IMMUNIZATIONS                 (  )  _______________         Please fax records to [facility], Jennifer P Lafferty, FNP, [phone]     If you have any questions, please contact  at [phone].           Patient Name: Steven Campo  : 1955  Patient Phone #: [phone]

## 2023-05-24 NOTE — PATIENT INSTRUCTIONS
Counseling and Referral of Other Preventative  (Italic type indicates deductible and co-insurance are waived)    Patient Name: Steven Campo  Today's Date: 5/24/2023    Health Maintenance         Date Due Completion Date    Shingles Vaccine (2 of 2) 10/27/2023 (Originally 9/30/2019) 8/5/2019    COVID-19 Vaccine (1) 10/27/2023 (Originally 3/29/1956) ---    HIV Screening 07/27/2028 (Originally 9/29/1972) ---    Influenza Vaccine (Season Ended) 09/01/2023 2/24/2020    TETANUS VACCINE 09/16/2023 9/16/2013    PROSTATE-SPECIFIC ANTIGEN 01/30/2024 1/30/2023    High Dose Statin 05/08/2024 5/8/2023    Lipid Panel 01/30/2028 1/30/2023    Colorectal Cancer Screening 11/30/2032 11/30/2022          No orders of the defined types were placed in this encounter.

## 2023-07-18 ENCOUNTER — OFFICE VISIT (OUTPATIENT)
Dept: FAMILY MEDICINE | Facility: CLINIC | Age: 68
End: 2023-07-18
Payer: MEDICARE

## 2023-07-18 VITALS
TEMPERATURE: 99 F | RESPIRATION RATE: 20 BRPM | HEIGHT: 71 IN | WEIGHT: 144 LBS | DIASTOLIC BLOOD PRESSURE: 82 MMHG | HEART RATE: 102 BPM | OXYGEN SATURATION: 98 % | SYSTOLIC BLOOD PRESSURE: 126 MMHG | BODY MASS INDEX: 20.16 KG/M2

## 2023-07-18 DIAGNOSIS — N18.32 STAGE 3B CHRONIC KIDNEY DISEASE: Chronic | ICD-10-CM

## 2023-07-18 DIAGNOSIS — I10 ESSENTIAL (PRIMARY) HYPERTENSION: Primary | Chronic | ICD-10-CM

## 2023-07-18 PROCEDURE — 99213 PR OFFICE/OUTPT VISIT, EST, LEVL III, 20-29 MIN: ICD-10-PCS | Mod: ,,, | Performed by: NURSE PRACTITIONER

## 2023-07-18 PROCEDURE — 99213 OFFICE O/P EST LOW 20 MIN: CPT | Mod: ,,, | Performed by: NURSE PRACTITIONER

## 2023-07-18 RX ORDER — AMLODIPINE BESYLATE 5 MG/1
5 TABLET ORAL DAILY
Qty: 90 TABLET | Refills: 1 | Status: SHIPPED | OUTPATIENT
Start: 2023-07-18 | End: 2023-11-30 | Stop reason: SDUPTHER

## 2023-07-18 NOTE — PROGRESS NOTES
[facility]       PATIENT NAME: Steven Campo   : 1955    AGE: 67 y.o. DATE OF ENCOUNTER: 23    MRN: [id number]      PCP: JODY Jimenez    Reason for Visit / Chief Complaint:  No chief complaint on file.         274}    Subjective:     HPI:    Presents with his sister for 6 week f/u uncontrolled HTN.  BP improved on amlodipine 5 mg daily.    Followed by Oncology Dr. Vazquez for lung CA.  Pulmonologist, Dr. Nugent.    Review of Systems:   Review of Systems   Constitutional: Negative.  Negative for appetite change.   HENT: Negative.     Eyes: Negative.    Respiratory:  Positive for cough and shortness of breath (chronic). Negative for wheezing.    Cardiovascular: Negative.    Gastrointestinal: Negative.    Endocrine: Negative.    Genitourinary: Negative.    Skin: Negative.    Allergic/Immunologic: Negative.    Neurological: Negative.    Hematological: Negative.    Psychiatric/Behavioral: Negative.       Allergies and Meds: 274}     Review of patient's allergies indicates:   Allergen Reactions    Salsalate      Other reaction(s): Abdominal pain    Tramadol      Other reaction(s): Seizure        Current Outpatient Medications:     albuterol (VENTOLIN HFA) 90 mcg/actuation inhaler, Inhale 2 puffs into the lungs every 4 (four) hours as needed for Wheezing or Shortness of Breath. Rescue, Disp: 54 g, Rfl: 1    albuterol-ipratropium (DUO-NEB) 2.5 mg-0.5 mg/3 mL nebulizer solution, Take 3 mLs by nebulization every 6 (six) hours as needed for Wheezing or Shortness of Breath. Rescue, Disp: 360 mL, Rfl: 1    aspirin 81 mg Cap, , Disp: , Rfl:     atorvastatin (LIPITOR) 40 MG tablet, Take 1 tablet (40 mg total) by mouth once daily., Disp: 90 tablet, Rfl: 3    finasteride (PROSCAR) 5 mg tablet, Take 5 mg by mouth., Disp: , Rfl:     mometasone (ASMANEX HFA) 200 mcg/actuation HFAA, Inhale 2 puffs into the lungs 2 (two) times a day. Controller, Disp: 39 g, Rfl: 3    amLODIPine  (NORVASC) 5 MG tablet, Take 1 tablet (5 mg total) by mouth once daily., Disp: 90 tablet, Rfl: 1    Labs:274}   I have reviewed labs below:  Lab Results   Component Value Date    WBC 12.08 (H) 01/30/2023    RBC 4.43 (L) 01/30/2023    HGB 12.3 (L) 01/30/2023    HCT 37.5 (L) 01/30/2023     (H) 01/30/2023     01/30/2023    K 3.8 01/30/2023     01/30/2023    CALCIUM 9.1 01/30/2023     01/30/2023    BUN 16 01/30/2023    CREATININE 1.00 01/30/2023    EGFRNONAA 40 (L) 07/21/2022    ALT 16 01/30/2023    AST 23 01/30/2023    CHOL 157 01/30/2023    TRIG 55 01/30/2023    HDL 76 (H) 01/30/2023    LDLCALC 70 01/30/2023    TSH 0.727 07/21/2022    PSA 10.100 (H) 01/30/2023       Medical History: 274}     Past Medical History:   Diagnosis Date    Adenocarcinoma of lung     Dx 2/22/23    Chronic bilateral low back pain with right-sided sciatica     Chronic pain of right knee     Essential (primary) hypertension     Hypercholesteremia     Nicotine dependence, cigarettes, uncomplicated     Seizures 2020    last was approx 7 mths ago    Stroke 2015    with residual right sided weakness      Social History     Tobacco Use   Smoking Status Every Day    Packs/day: 1.00    Years: 44.00    Pack years: 44.00    Types: Cigarettes    Passive exposure: Current   Smokeless Tobacco Never      Past Surgical History:   Procedure Laterality Date    KNEE ARTHROSCOPY Right 1995        Health Maintenance: 274}     Health Maintenance         Date Due Completion Date    Shingles Vaccine (2 of 2) 10/27/2023 (Originally 9/30/2019) 8/5/2019    COVID-19 Vaccine (1) 10/27/2023 (Originally 3/29/1956) ---    HIV Screening 07/27/2028 (Originally 9/29/1972) ---    Influenza Vaccine (1) 09/01/2023 2/24/2020    TETANUS VACCINE 09/16/2023 9/16/2013    PROSTATE-SPECIFIC ANTIGEN 01/30/2024 1/30/2023    High Dose Statin 07/18/2024 7/18/2023    Lipid Panel 01/30/2028 1/30/2023    Colorectal Cancer Screening 11/30/2032 11/30/2022       "      Objective:  274}   /82 (BP Location: Right arm, Patient Position: Sitting, BP Method: Small (Automatic))   Pulse 102   Temp 98.8 °F (37.1 °C) (Oral)   Resp 20   Ht 5' 11" (1.803 m)   Wt 65.3 kg (144 lb)   SpO2 98%   BMI 20.08 kg/m²     Wt Readings from Last 3 Encounters:   07/18/23 65.3 kg (144 lb)   05/24/23 64.9 kg (143 lb)   05/08/23 68 kg (150 lb)     BP Readings from Last 3 Encounters:   07/18/23 126/82   05/24/23 (!) 150/103   05/08/23 123/84     Body mass index is 20.08 kg/m².     Physical Exam  Vitals and nursing note reviewed.   Constitutional:       General: He is not in acute distress.     Appearance: Normal appearance. He is not ill-appearing.   HENT:      Head: Normocephalic.   Eyes:      Conjunctiva/sclera: Conjunctivae normal.   Cardiovascular:      Rate and Rhythm: Normal rate and regular rhythm.      Heart sounds: Normal heart sounds.   Pulmonary:      Effort: Pulmonary effort is normal. No respiratory distress.      Breath sounds: Normal breath sounds.   Musculoskeletal:      Cervical back: Neck supple.      Right lower leg: No edema.      Left lower leg: Edema present.   Skin:     General: Skin is warm and dry.   Neurological:      Mental Status: He is alert and oriented to person, place, and time.      Motor: Weakness: using rollator.      Gait: Gait abnormal.        Assessment and Plan: 274}     1. Essential (primary) hypertension  Comments:  Improved, continue amlodipine 5 mg daily.  Orders:  -     amLODIPine (NORVASC) 5 MG tablet; Take 1 tablet (5 mg total) by mouth once daily.  Dispense: 90 tablet; Refill: 1    2. Stage 3b chronic kidney disease  Comments:  Stable       Completed form given to update handicap parking permit which expires in Sept.    Return to clinic 11/30/23 as scheduled for HTN, CKD, COPD, & HLD; and sooner as needed.    Future Appointments   Date Time Provider Department Center   7/28/2023 10:15 AM Harsha Sanchez MD [facility] NEURO [facility]   11/30/2023  " 7:00 AM [facility] GI ROOM 01 [place] [facility]   11/30/2023 10:40 AM JODY Jimenez [facility] [place]   5/29/2024 10:00 AM AWLAVONNE NURSE, [facility] [place]        Signature:  JODY Jimenez

## 2023-07-28 ENCOUNTER — OFFICE VISIT (OUTPATIENT)
Dept: NEUROLOGY | Facility: CLINIC | Age: 68
End: 2023-07-28
Payer: OTHER GOVERNMENT

## 2023-07-28 VITALS
WEIGHT: 144 LBS | RESPIRATION RATE: 18 BRPM | SYSTOLIC BLOOD PRESSURE: 94 MMHG | DIASTOLIC BLOOD PRESSURE: 56 MMHG | HEART RATE: 65 BPM | OXYGEN SATURATION: 98 % | BODY MASS INDEX: 20.16 KG/M2 | HEIGHT: 71 IN

## 2023-07-28 DIAGNOSIS — Z86.73 HISTORY OF STROKE: Primary | ICD-10-CM

## 2023-07-28 PROCEDURE — 99214 OFFICE O/P EST MOD 30 MIN: CPT | Mod: PBBFAC | Performed by: PSYCHIATRY & NEUROLOGY

## 2023-07-28 PROCEDURE — 99214 OFFICE O/P EST MOD 30 MIN: CPT | Mod: S$PBB,,, | Performed by: PSYCHIATRY & NEUROLOGY

## 2023-07-28 PROCEDURE — 99214 PR OFFICE/OUTPT VISIT, EST, LEVL IV, 30-39 MIN: ICD-10-PCS | Mod: S$PBB,,, | Performed by: PSYCHIATRY & NEUROLOGY

## 2023-07-28 NOTE — PROGRESS NOTES
Subjective:       Patient ID: Steven Campo is a 67 y.o. male     Chief Complaint:    Chief Complaint   Patient presents with    Follow-up     Seizures        Allergies:  Salsalate and Tramadol    Current Medications:    Outpatient Encounter Medications as of 7/28/2023   Medication Sig Dispense Refill    albuterol (VENTOLIN HFA) 90 mcg/actuation inhaler Inhale 2 puffs into the lungs every 4 (four) hours as needed for Wheezing or Shortness of Breath. Rescue 54 g 1    albuterol-ipratropium (DUO-NEB) 2.5 mg-0.5 mg/3 mL nebulizer solution Take 3 mLs by nebulization every 6 (six) hours as needed for Wheezing or Shortness of Breath. Rescue 360 mL 1    amLODIPine (NORVASC) 5 MG tablet Take 1 tablet (5 mg total) by mouth once daily. 90 tablet 1    aspirin 81 mg Cap       atorvastatin (LIPITOR) 40 MG tablet Take 1 tablet (40 mg total) by mouth once daily. 90 tablet 3    finasteride (PROSCAR) 5 mg tablet Take 5 mg by mouth.      mometasone (ASMANEX HFA) 200 mcg/actuation HFAA Inhale 2 puffs into the lungs 2 (two) times a day. Controller 39 g 3    [DISCONTINUED] amLODIPine (NORVASC) 5 MG tablet Take 1 tablet (5 mg total) by mouth once daily. 90 tablet 0    [DISCONTINUED] ondansetron (ZOFRAN) 8 MG tablet Take by mouth every 8 (eight) hours as needed for Nausea.      [DISCONTINUED] prochlorperazine (COMPAZINE) 5 MG tablet Take 5 mg by mouth every 6 (six) hours as needed for Nausea.       No facility-administered encounter medications on file as of 7/28/2023.       History of Present Illness  66 yo BM s/p stroke 2015 still taking ASPIRIN but still smoking tobacco   Now w/ lung Ca s/p chemo and soon to be XRT   Still w/ R hemiparesis            Past Medical History:   Diagnosis Date    Adenocarcinoma of lung     Dx 2/22/23    Chronic bilateral low back pain with right-sided sciatica     Chronic pain of right knee     Essential (primary) hypertension     Hypercholesteremia     Nicotine dependence, cigarettes, uncomplicated      "Seizures 2020    last was approx 7 mths ago    Stroke 2015    with residual right sided weakness       Past Surgical History:   Procedure Laterality Date    KNEE ARTHROSCOPY Right 1995       Social History  Mr. Campo  reports that he has been smoking cigarettes. He has a 44.00 pack-year smoking history. He has been exposed to tobacco smoke. He has never used smokeless tobacco. He reports current alcohol use. He reports current drug use. Drug: Marijuana.    Family History  Mr.'s Campo family history includes Heart disease in his brother and mother; Hyperlipidemia in his sister, sister, sister, and sister; Hypertension in his brother, brother, mother, sister, sister, sister, sister, and sister; Kidney failure in his sister; Lung cancer in his father; No Known Problems in his daughter, daughter, maternal grandfather, maternal grandmother, paternal grandfather, paternal grandmother, and sister; Pancreatic cancer in his sister.    Review of Systems  Review of Systems   Neurological:  Positive for focal weakness.   All other systems reviewed and are negative.   Objective:   BP (!) 94/56 (BP Location: Left arm, Patient Position: Sitting, BP Method: Large (Manual))   Pulse 65   Resp 18   Ht 5' 11" (1.803 m)   Wt 65.3 kg (144 lb)   SpO2 98%   BMI 20.08 kg/m²    NEUROLOGICAL EXAMINATION:     MENTAL STATUS   Oriented to person, place, and time.   Level of consciousness: alert  Knowledge: consistent with education.     CRANIAL NERVES   Cranial nerves II through XII intact.     MOTOR EXAM        R hemiparesis      GAIT AND COORDINATION        Wheelchair       Physical Exam  Vitals reviewed.   Constitutional:       Appearance: He is normal weight.   Neurological:      Mental Status: He is alert and oriented to person, place, and time. Mental status is at baseline.      Cranial Nerves: Cranial nerves 2-12 are intact.        Assessment:     History of stroke         Primary Diagnosis and ICD10  History of stroke " [Z86.73]    Plan:     Patient Instructions   Cont ASPIRIN  Try to stop smokin g  F/u Oncology regularly  F/u 12 months    There are no discontinued medications.    Requested Prescriptions      No prescriptions requested or ordered in this encounter

## 2023-11-29 DIAGNOSIS — Z12.11 SCREENING FOR COLON CANCER: Primary | ICD-10-CM

## 2023-11-29 RX ORDER — POLYETHYLENE GLYCOL 3350, SODIUM SULFATE ANHYDROUS, SODIUM BICARBONATE, SODIUM CHLORIDE, POTASSIUM CHLORIDE 236; 22.74; 6.74; 5.86; 2.97 G/4L; G/4L; G/4L; G/4L; G/4L
4 POWDER, FOR SOLUTION ORAL ONCE
Qty: 4000 ML | Refills: 0 | Status: SHIPPED | OUTPATIENT
Start: 2023-11-29 | End: 2023-11-30

## 2023-11-30 ENCOUNTER — OFFICE VISIT (OUTPATIENT)
Dept: FAMILY MEDICINE | Facility: CLINIC | Age: 68
End: 2023-11-30
Payer: MEDICARE

## 2023-11-30 VITALS
RESPIRATION RATE: 20 BRPM | SYSTOLIC BLOOD PRESSURE: 136 MMHG | DIASTOLIC BLOOD PRESSURE: 79 MMHG | TEMPERATURE: 98 F | BODY MASS INDEX: 20.08 KG/M2 | HEIGHT: 71 IN | OXYGEN SATURATION: 95 % | HEART RATE: 114 BPM

## 2023-11-30 DIAGNOSIS — I10 ESSENTIAL (PRIMARY) HYPERTENSION: Chronic | ICD-10-CM

## 2023-11-30 PROCEDURE — 99213 PR OFFICE/OUTPT VISIT, EST, LEVL III, 20-29 MIN: ICD-10-PCS | Mod: ,,, | Performed by: NURSE PRACTITIONER

## 2023-11-30 PROCEDURE — 99213 OFFICE O/P EST LOW 20 MIN: CPT | Mod: ,,, | Performed by: NURSE PRACTITIONER

## 2023-11-30 RX ORDER — AMLODIPINE BESYLATE 5 MG/1
5 TABLET ORAL DAILY
Qty: 90 TABLET | Refills: 1 | Status: SHIPPED | OUTPATIENT
Start: 2023-11-30

## 2023-11-30 NOTE — PROGRESS NOTES
[facility] FAMILY MEDICINE       PATIENT NAME: Steven Campo   : 1955    AGE: 68 y.o. DATE OF ENCOUNTER: 23    MRN: [id number]      PCP: Dawn Dumont FNP    Reason for Visit / Chief Complaint:  Follow-up (Patient presents to the clinic for a 6m f/u hld)         274}    Subjective:     HPI:    Presents for HTN, CKD, COPD, & HLD. Reports is doing okay with no acute complaints.  Is on chemo & radiation for lung cancer.  Reports he had flu shot at his Oncologist's office.  Urologist, Dr. Vito Velasquez.    Review of Systems:   Review of Systems   Constitutional: Negative.  Negative for appetite change.   HENT: Negative.     Eyes: Negative.    Respiratory:  Positive for cough and shortness of breath (chronic). Negative for wheezing.    Cardiovascular: Negative.    Gastrointestinal: Negative.    Endocrine: Negative.    Genitourinary: Negative.    Skin: Negative.    Allergic/Immunologic: Negative.    Neurological: Negative.    Hematological: Negative.    Psychiatric/Behavioral: Negative.         Allergies and Meds: 274}     Review of patient's allergies indicates:   Allergen Reactions    Salsalate      Other reaction(s): Abdominal pain    Tramadol      Other reaction(s): Seizure        Current Outpatient Medications:     albuterol (VENTOLIN HFA) 90 mcg/actuation inhaler, Inhale 2 puffs into the lungs every 4 (four) hours as needed for Wheezing or Shortness of Breath. Rescue, Disp: 54 g, Rfl: 1    albuterol-ipratropium (DUO-NEB) 2.5 mg-0.5 mg/3 mL nebulizer solution, Take 3 mLs by nebulization every 6 (six) hours as needed for Wheezing or Shortness of Breath. Rescue, Disp: 360 mL, Rfl: 1    aspirin 81 mg Cap, Take 81 mg by mouth once daily., Disp: , Rfl:     atorvastatin (LIPITOR) 40 MG tablet, Take 1 tablet (40 mg total) by mouth once daily., Disp: 90 tablet, Rfl: 3    finasteride (PROSCAR) 5 mg tablet, Take 5 mg by mouth once daily., Disp: , Rfl:     mometasone (ASMANEX HFA) 200  mcg/actuation HFAA, Inhale 2 puffs into the lungs 2 (two) times a day. Controller, Disp: 39 g, Rfl: 3    amLODIPine (NORVASC) 5 MG tablet, Take 1 tablet (5 mg total) by mouth once daily., Disp: 90 tablet, Rfl: 1    Labs:274}   I have reviewed labs below:  Lab Results   Component Value Date    WBC 12.08 (H) 01/30/2023    RBC 4.43 (L) 01/30/2023    HGB 12.3 (L) 01/30/2023    HCT 37.5 (L) 01/30/2023     (H) 01/30/2023     01/30/2023    K 3.8 01/30/2023     01/30/2023    CALCIUM 9.1 01/30/2023     01/30/2023    BUN 16 01/30/2023    CREATININE 1.00 01/30/2023    EGFRNONAA 40 (L) 07/21/2022    ALT 16 01/30/2023    AST 23 01/30/2023    CHOL 157 01/30/2023    TRIG 55 01/30/2023    HDL 76 (H) 01/30/2023    LDLCALC 70 01/30/2023    TSH 0.727 07/21/2022    PSA 10.100 (H) 01/30/2023       Medical History: 274}     Past Medical History:   Diagnosis Date    Adenocarcinoma of lung     Dx 2/22/23    Chronic bilateral low back pain with right-sided sciatica     Chronic pain of right knee     Essential (primary) hypertension     Hypercholesteremia     Nicotine dependence, cigarettes, uncomplicated     Seizures 2020    last was approx 7 mths ago    Stroke 2015    with residual right sided weakness      Social History     Tobacco Use   Smoking Status Every Day    Current packs/day: 1.00    Average packs/day: 1 pack/day for 44.0 years (44.0 ttl pk-yrs)    Types: Cigarettes    Passive exposure: Current   Smokeless Tobacco Never      Past Surgical History:   Procedure Laterality Date    KNEE ARTHROSCOPY Right 1995        Health Maintenance: 274}     Health Maintenance         Date Due Completion Date    COVID-19 Vaccine (1) Never done ---    RSV Vaccine (Age 60+ and Pregnant patients) (1 - 1-dose 60+ series) Never done ---    Shingles Vaccine (2 of 2) 09/30/2019 8/5/2019    Influenza Vaccine (1) 09/01/2023 2/24/2020    TETANUS VACCINE 09/16/2023 9/16/2013    PROSTATE-SPECIFIC ANTIGEN 01/30/2024 1/30/2023    High  "Dose Statin 11/30/2024 11/30/2023    Lipid Panel 01/30/2028 1/30/2023    Colorectal Cancer Screening 11/30/2032 11/30/2022          Immunization History   Administered Date(s) Administered    Influenza - Trivalent - PF (ADULT) 02/04/2019, 02/24/2020    Pneumococcal Conjugate - 20 Valent 10/27/2022    Pneumococcal Polysaccharide - 23 Valent 08/05/2019    Tdap 09/16/2013    Zoster Recombinant 08/05/2019     Objective:  274}   /79 (BP Location: Right arm, Patient Position: Sitting, BP Method: Medium (Automatic))   Pulse (!) 114   Temp 97.6 °F (36.4 °C) (Oral)   Resp 20   Ht 5' 11" (1.803 m)   SpO2 95%   BMI 20.08 kg/m²     Wt Readings from Last 3 Encounters:   07/28/23 65.3 kg (144 lb)   07/18/23 65.3 kg (144 lb)   05/24/23 64.9 kg (143 lb)     BP Readings from Last 3 Encounters:   11/30/23 136/79   07/28/23 (!) 94/56   07/18/23 126/82     Body mass index is 20.08 kg/m².     Physical Exam  Vitals and nursing note reviewed.   Constitutional:       General: He is not in acute distress.     Appearance: Normal appearance. He is not ill-appearing.   HENT:      Head: Normocephalic.   Eyes:      Conjunctiva/sclera: Conjunctivae normal.   Cardiovascular:      Rate and Rhythm: Normal rate and regular rhythm.      Heart sounds: Normal heart sounds.   Pulmonary:      Effort: Pulmonary effort is normal. No respiratory distress.      Breath sounds: Normal breath sounds.   Musculoskeletal:      Cervical back: Neck supple.      Right lower leg: No edema.      Left lower leg: No edema.   Skin:     General: Skin is warm and dry.   Neurological:      Mental Status: He is alert and oriented to person, place, and time.      Motor: Weakness: using rollator.      Gait: Gait abnormal.          Assessment and Plan: 274}     1. Essential (primary) hypertension  Comments:  Controlled, continue amlodipine 5 mg daily.  Orders:  -     amLODIPine (NORVASC) 5 MG tablet; Take 1 tablet (5 mg total) by mouth once daily.  Dispense: 90 " tablet; Refill: 1       Return to clinic 6 mth f/u HTN & HLD with fasting labs; and sooner as needed.    Future Appointments   Date Time Provider Department Center   5/29/2024 10:00 AM AWV NURSE, [facility] FAMILY MEDICINE [facility] [place]   6/4/2024 10:20 AM Dawn Dumont FNP [facility] [place]   6/12/2024  8:00 AM [facility] GI ROOM 01 [facility]        Signature:  JODY Jimenez

## 2024-01-01 ENCOUNTER — ANESTHESIA EVENT (OUTPATIENT)
Dept: GASTROENTEROLOGY | Facility: HOSPITAL | Age: 69
DRG: 871 | End: 2024-01-01
Payer: OTHER GOVERNMENT

## 2024-01-01 ENCOUNTER — HOSPITAL ENCOUNTER (OUTPATIENT)
Dept: RADIOLOGY | Facility: HOSPITAL | Age: 69
Discharge: HOME | End: 2024-03-11
Attending: STUDENT IN AN ORGANIZED HEALTH CARE EDUCATION/TRAINING PROGRAM
Payer: MEDICARE

## 2024-01-01 ENCOUNTER — HOSPITAL ENCOUNTER (EMERGENCY)
Facility: HOSPITAL | Age: 69
Discharge: HOME | End: 2024-04-10
Payer: MEDICARE

## 2024-01-01 ENCOUNTER — HOSPITAL ENCOUNTER (OUTPATIENT)
Dept: RADIOLOGY | Facility: HOSPITAL | Age: 69
Discharge: HOME | End: 2024-02-23
Attending: ORTHOPAEDIC SURGERY
Payer: MEDICARE

## 2024-01-01 ENCOUNTER — HOSPITAL ENCOUNTER (OUTPATIENT)
Facility: HOSPITAL | Age: 69
Discharge: HOME | End: 2024-04-09
Attending: PAIN MEDICINE | Admitting: PAIN MEDICINE
Payer: MEDICARE

## 2024-01-01 ENCOUNTER — OFFICE VISIT (OUTPATIENT)
Dept: SPINE | Facility: CLINIC | Age: 69
End: 2024-01-01
Payer: MEDICARE

## 2024-01-01 ENCOUNTER — HOSPITAL ENCOUNTER (INPATIENT)
Facility: HOSPITAL | Age: 69
LOS: 5 days | Discharge: NURSING, REHAB OR LONG-TERM CARE | DRG: 871 | End: 2024-02-08
Attending: INTERNAL MEDICINE | Admitting: INTERNAL MEDICINE
Payer: OTHER GOVERNMENT

## 2024-01-01 ENCOUNTER — ANESTHESIA (OUTPATIENT)
Dept: PAIN MEDICINE | Facility: HOSPITAL | Age: 69
End: 2024-01-01
Payer: MEDICARE

## 2024-01-01 ENCOUNTER — HOSPITAL ENCOUNTER (OUTPATIENT)
Dept: RADIOLOGY | Facility: HOSPITAL | Age: 69
Discharge: HOME | End: 2024-04-11
Attending: ORTHOPAEDIC SURGERY
Payer: MEDICARE

## 2024-01-01 ENCOUNTER — OFFICE VISIT (OUTPATIENT)
Dept: PULMONOLOGY | Facility: CLINIC | Age: 69
End: 2024-01-01
Payer: MEDICARE

## 2024-01-01 ENCOUNTER — HOSPITAL ENCOUNTER (EMERGENCY)
Facility: HOSPITAL | Age: 69
End: 2024-05-05
Payer: MEDICARE

## 2024-01-01 ENCOUNTER — HOSPITAL ENCOUNTER (OUTPATIENT)
Dept: RADIOLOGY | Facility: HOSPITAL | Age: 69
Discharge: HOME | End: 2024-02-20
Attending: STUDENT IN AN ORGANIZED HEALTH CARE EDUCATION/TRAINING PROGRAM
Payer: MEDICARE

## 2024-01-01 ENCOUNTER — OFFICE VISIT (OUTPATIENT)
Dept: PULMONOLOGY | Facility: CLINIC | Age: 69
End: 2024-01-01
Payer: OTHER GOVERNMENT

## 2024-01-01 ENCOUNTER — OFFICE VISIT (OUTPATIENT)
Dept: PAIN MEDICINE | Facility: CLINIC | Age: 69
End: 2024-01-01
Payer: MEDICARE

## 2024-01-01 ENCOUNTER — ANESTHESIA EVENT (OUTPATIENT)
Dept: PAIN MEDICINE | Facility: HOSPITAL | Age: 69
End: 2024-01-01
Payer: MEDICARE

## 2024-01-01 VITALS
DIASTOLIC BLOOD PRESSURE: 72 MMHG | HEART RATE: 96 BPM | SYSTOLIC BLOOD PRESSURE: 105 MMHG | RESPIRATION RATE: 18 BRPM | TEMPERATURE: 99 F | OXYGEN SATURATION: 98 %

## 2024-01-01 VITALS — TEMPERATURE: 98 F | WEIGHT: 105 LBS | HEIGHT: 71 IN | BODY MASS INDEX: 14.7 KG/M2

## 2024-01-01 VITALS
SYSTOLIC BLOOD PRESSURE: 118 MMHG | OXYGEN SATURATION: 100 % | HEIGHT: 71 IN | DIASTOLIC BLOOD PRESSURE: 69 MMHG | RESPIRATION RATE: 16 BRPM | DIASTOLIC BLOOD PRESSURE: 66 MMHG | HEIGHT: 71 IN | HEART RATE: 109 BPM | WEIGHT: 137.56 LBS | WEIGHT: 137 LBS | HEART RATE: 107 BPM | BODY MASS INDEX: 19.26 KG/M2 | SYSTOLIC BLOOD PRESSURE: 128 MMHG | OXYGEN SATURATION: 97 % | BODY MASS INDEX: 19.18 KG/M2 | RESPIRATION RATE: 18 BRPM | TEMPERATURE: 99 F

## 2024-01-01 VITALS
OXYGEN SATURATION: 98 % | HEIGHT: 71 IN | WEIGHT: 120 LBS | RESPIRATION RATE: 18 BRPM | SYSTOLIC BLOOD PRESSURE: 94 MMHG | HEART RATE: 106 BPM | DIASTOLIC BLOOD PRESSURE: 58 MMHG | BODY MASS INDEX: 16.8 KG/M2

## 2024-01-01 VITALS
HEART RATE: 111 BPM | RESPIRATION RATE: 11 BRPM | BODY MASS INDEX: 16.1 KG/M2 | DIASTOLIC BLOOD PRESSURE: 67 MMHG | OXYGEN SATURATION: 94 % | SYSTOLIC BLOOD PRESSURE: 100 MMHG | TEMPERATURE: 98 F | HEIGHT: 71 IN | WEIGHT: 115 LBS

## 2024-01-01 VITALS
BODY MASS INDEX: 16.8 KG/M2 | SYSTOLIC BLOOD PRESSURE: 109 MMHG | WEIGHT: 120 LBS | DIASTOLIC BLOOD PRESSURE: 57 MMHG | RESPIRATION RATE: 18 BRPM | HEIGHT: 71 IN | HEART RATE: 103 BPM

## 2024-01-01 DIAGNOSIS — R91.8 MASS OF RIGHT LUNG: Primary | ICD-10-CM

## 2024-01-01 DIAGNOSIS — N40.1 BENIGN PROSTATIC HYPERPLASIA WITH URINARY RETENTION: ICD-10-CM

## 2024-01-01 DIAGNOSIS — D49.2 NEOPLASM OF LUMBAR VERTEBRAL COLUMN: Primary | ICD-10-CM

## 2024-01-01 DIAGNOSIS — C34.91 MALIGNANT NEOPLASM OF RIGHT LUNG, UNSPECIFIED PART OF LUNG: ICD-10-CM

## 2024-01-01 DIAGNOSIS — F17.200 TOBACCO DEPENDENCE: ICD-10-CM

## 2024-01-01 DIAGNOSIS — R91.8 MASS OF RIGHT LUNG: ICD-10-CM

## 2024-01-01 DIAGNOSIS — A41.9 SEPSIS DUE TO PNEUMONIA: ICD-10-CM

## 2024-01-01 DIAGNOSIS — G89.29 CHRONIC BILATERAL LOW BACK PAIN WITH RIGHT-SIDED SCIATICA: Chronic | ICD-10-CM

## 2024-01-01 DIAGNOSIS — J18.9 SEPSIS DUE TO PNEUMONIA: ICD-10-CM

## 2024-01-01 DIAGNOSIS — R91.8 MASS OF RIGHT LUNG: Chronic | ICD-10-CM

## 2024-01-01 DIAGNOSIS — G89.29 CHRONIC LOWER BACK PAIN: ICD-10-CM

## 2024-01-01 DIAGNOSIS — S32.010D CLOSED COMPRESSION FRACTURE OF L1 LUMBAR VERTEBRA WITH ROUTINE HEALING, SUBSEQUENT ENCOUNTER: Primary | ICD-10-CM

## 2024-01-01 DIAGNOSIS — R00.0 TACHYCARDIA: ICD-10-CM

## 2024-01-01 DIAGNOSIS — S32.019D CLOSED FRACTURE OF FIRST LUMBAR VERTEBRA WITH ROUTINE HEALING, UNSPECIFIED FRACTURE MORPHOLOGY, SUBSEQUENT ENCOUNTER: ICD-10-CM

## 2024-01-01 DIAGNOSIS — J44.89 CHRONIC BRONCHITIS WITH EMPHYSEMA: Chronic | ICD-10-CM

## 2024-01-01 DIAGNOSIS — S32.010A CLOSED COMPRESSION FRACTURE OF L1 VERTEBRA, INITIAL ENCOUNTER: ICD-10-CM

## 2024-01-01 DIAGNOSIS — R53.1 GENERALIZED WEAKNESS: ICD-10-CM

## 2024-01-01 DIAGNOSIS — S32.010D CLOSED COMPRESSION FRACTURE OF L1 LUMBAR VERTEBRA WITH ROUTINE HEALING, SUBSEQUENT ENCOUNTER: Primary | Chronic | ICD-10-CM

## 2024-01-01 DIAGNOSIS — M54.50 CHRONIC LOWER BACK PAIN: ICD-10-CM

## 2024-01-01 DIAGNOSIS — S32.019A CLOSED FRACTURE OF FIRST LUMBAR VERTEBRA, UNSPECIFIED FRACTURE MORPHOLOGY, INITIAL ENCOUNTER: Primary | ICD-10-CM

## 2024-01-01 DIAGNOSIS — N18.32 STAGE 3B CHRONIC KIDNEY DISEASE: Chronic | ICD-10-CM

## 2024-01-01 DIAGNOSIS — C34.01 MALIGNANT NEOPLASM OF HILUS OF RIGHT LUNG: ICD-10-CM

## 2024-01-01 DIAGNOSIS — D72.829 LEUKOCYTOSIS: ICD-10-CM

## 2024-01-01 DIAGNOSIS — Z66 DNR (DO NOT RESUSCITATE): ICD-10-CM

## 2024-01-01 DIAGNOSIS — R33.9 URINARY RETENTION: Primary | ICD-10-CM

## 2024-01-01 DIAGNOSIS — S32.010A CLOSED COMPRESSION FRACTURE OF L1 VERTEBRA, INITIAL ENCOUNTER: Primary | ICD-10-CM

## 2024-01-01 DIAGNOSIS — I46.9 CARDIAC ARREST: Primary | ICD-10-CM

## 2024-01-01 DIAGNOSIS — E86.0 DEHYDRATION: ICD-10-CM

## 2024-01-01 DIAGNOSIS — J90 PLEURAL EFFUSION: ICD-10-CM

## 2024-01-01 DIAGNOSIS — J18.9 PNEUMONIA DUE TO INFECTIOUS ORGANISM, UNSPECIFIED LATERALITY, UNSPECIFIED PART OF LUNG: Primary | ICD-10-CM

## 2024-01-01 DIAGNOSIS — J90 PLEURAL EFFUSION, NOT ELSEWHERE CLASSIFIED: ICD-10-CM

## 2024-01-01 DIAGNOSIS — Z86.73 HISTORY OF STROKE: Chronic | ICD-10-CM

## 2024-01-01 DIAGNOSIS — I46.9 CARDIOPULMONARY ARREST: ICD-10-CM

## 2024-01-01 DIAGNOSIS — Z79.899 ENCOUNTER FOR LONG-TERM (CURRENT) USE OF OTHER MEDICATIONS: ICD-10-CM

## 2024-01-01 DIAGNOSIS — S32.010D CLOSED COMPRESSION FRACTURE OF L1 LUMBAR VERTEBRA WITH ROUTINE HEALING, SUBSEQUENT ENCOUNTER: ICD-10-CM

## 2024-01-01 DIAGNOSIS — J90 PLEURAL EFFUSION, NOT ELSEWHERE CLASSIFIED: Primary | ICD-10-CM

## 2024-01-01 DIAGNOSIS — S32.019A CLOSED FRACTURE OF FIRST LUMBAR VERTEBRA, UNSPECIFIED FRACTURE MORPHOLOGY, INITIAL ENCOUNTER: ICD-10-CM

## 2024-01-01 DIAGNOSIS — D64.9 NORMOCYTIC ANEMIA: ICD-10-CM

## 2024-01-01 DIAGNOSIS — R53.1 GENERALIZED WEAKNESS: Chronic | ICD-10-CM

## 2024-01-01 DIAGNOSIS — R07.9 CHEST PAIN: ICD-10-CM

## 2024-01-01 DIAGNOSIS — M54.41 CHRONIC BILATERAL LOW BACK PAIN WITH RIGHT-SIDED SCIATICA: Chronic | ICD-10-CM

## 2024-01-01 DIAGNOSIS — R33.8 BENIGN PROSTATIC HYPERPLASIA WITH URINARY RETENTION: ICD-10-CM

## 2024-01-01 DIAGNOSIS — Z71.89 COMPLEX CARE COORDINATION: ICD-10-CM

## 2024-01-01 LAB
ABO + RH BLD: NORMAL
ABORH RETYPE: NORMAL
ALBUMIN SERPL BCP-MCNC: 1.5 G/DL (ref 3.5–5)
ALBUMIN SERPL BCP-MCNC: 1.5 G/DL (ref 3.5–5)
ALBUMIN SERPL BCP-MCNC: 1.6 G/DL (ref 3.5–5)
ALBUMIN/GLOB SERPL: 0.3 {RATIO}
ALBUMIN/GLOB SERPL: 0.4 {RATIO}
ALP SERPL-CCNC: 103 U/L (ref 45–115)
ALP SERPL-CCNC: 116 U/L (ref 45–115)
ALP SERPL-CCNC: 118 U/L (ref 45–115)
ALP SERPL-CCNC: 120 U/L (ref 45–115)
ALP SERPL-CCNC: 131 U/L (ref 45–115)
ALT SERPL W P-5'-P-CCNC: 10 U/L (ref 16–61)
ALT SERPL W P-5'-P-CCNC: 14 U/L (ref 16–61)
ALT SERPL W P-5'-P-CCNC: 17 U/L (ref 16–61)
ALT SERPL W P-5'-P-CCNC: 20 U/L (ref 16–61)
ALT SERPL W P-5'-P-CCNC: 23 U/L (ref 16–61)
ANION GAP SERPL CALCULATED.3IONS-SCNC: 10 MMOL/L (ref 7–16)
ANION GAP SERPL CALCULATED.3IONS-SCNC: 11 MMOL/L (ref 7–16)
ANION GAP SERPL CALCULATED.3IONS-SCNC: 6 MMOL/L (ref 7–16)
ANION GAP SERPL CALCULATED.3IONS-SCNC: 7 MMOL/L (ref 7–16)
APTT PPP: 54.1 SECONDS (ref 25.2–37.3)
AST SERPL W P-5'-P-CCNC: 40 U/L (ref 15–37)
AST SERPL W P-5'-P-CCNC: 43 U/L (ref 15–37)
AST SERPL W P-5'-P-CCNC: 43 U/L (ref 15–37)
AST SERPL W P-5'-P-CCNC: 45 U/L (ref 15–37)
AST SERPL W P-5'-P-CCNC: 45 U/L (ref 15–37)
BACTERIA BLD CULT: NORMAL
BACTERIA BLD CULT: NORMAL
BASOPHILS # BLD AUTO: 0.02 K/UL (ref 0–0.2)
BASOPHILS # BLD AUTO: 0.03 K/UL (ref 0–0.2)
BASOPHILS NFR BLD AUTO: 0.1 % (ref 0–1)
BASOPHILS NFR BLD AUTO: 0.2 % (ref 0–1)
BILIRUB SERPL-MCNC: 0.2 MG/DL (ref ?–1.2)
BILIRUB UR QL STRIP: NEGATIVE
BLD PROD TYP BPU: NORMAL
BLOOD UNIT EXPIRATION DATE: NORMAL
BLOOD UNIT TYPE CODE: 5100
BUN SERPL-MCNC: 10 MG/DL (ref 7–18)
BUN SERPL-MCNC: 12 MG/DL (ref 7–18)
BUN SERPL-MCNC: 14 MG/DL (ref 7–18)
BUN SERPL-MCNC: 19 MG/DL (ref 7–18)
BUN SERPL-MCNC: 26 MG/DL (ref 7–18)
BUN SERPL-MCNC: 33 MG/DL (ref 7–18)
BUN/CREAT SERPL: 13 (ref 6–20)
BUN/CREAT SERPL: 17 (ref 6–20)
BUN/CREAT SERPL: 19 (ref 6–20)
BUN/CREAT SERPL: 24 (ref 6–20)
BUN/CREAT SERPL: 26 (ref 6–20)
BUN/CREAT SERPL: 32 (ref 6–20)
CALCIUM SERPL-MCNC: 8.5 MG/DL (ref 8.5–10.1)
CALCIUM SERPL-MCNC: 8.7 MG/DL (ref 8.5–10.1)
CALCIUM SERPL-MCNC: 8.9 MG/DL (ref 8.5–10.1)
CALCIUM SERPL-MCNC: 9.2 MG/DL (ref 8.5–10.1)
CHLORIDE SERPL-SCNC: 102 MMOL/L (ref 98–107)
CHLORIDE SERPL-SCNC: 102 MMOL/L (ref 98–107)
CHLORIDE SERPL-SCNC: 103 MMOL/L (ref 98–107)
CHLORIDE SERPL-SCNC: 103 MMOL/L (ref 98–107)
CHLORIDE SERPL-SCNC: 105 MMOL/L (ref 98–107)
CHLORIDE SERPL-SCNC: 105 MMOL/L (ref 98–107)
CLARITY UR: CLEAR
CO2 SERPL-SCNC: 23 MMOL/L (ref 21–32)
CO2 SERPL-SCNC: 24 MMOL/L (ref 21–32)
CO2 SERPL-SCNC: 25 MMOL/L (ref 21–32)
CO2 SERPL-SCNC: 27 MMOL/L (ref 21–32)
CO2 SERPL-SCNC: 28 MMOL/L (ref 21–32)
CO2 SERPL-SCNC: 29 MMOL/L (ref 21–32)
COLOR UR: YELLOW
CREAT SERPL-MCNC: 0.7 MG/DL (ref 0.7–1.3)
CREAT SERPL-MCNC: 0.74 MG/DL (ref 0.7–1.3)
CREAT SERPL-MCNC: 0.79 MG/DL (ref 0.7–1.3)
CREAT SERPL-MCNC: 0.79 MG/DL (ref 0.7–1.3)
CREAT SERPL-MCNC: 0.9 MG/DL (ref 0.6–1.3)
CREAT SERPL-MCNC: 0.99 MG/DL (ref 0.7–1.3)
CREAT SERPL-MCNC: 1.03 MG/DL (ref 0.7–1.3)
CROSSMATCH INTERPRETATION: NORMAL
CULTURE, FUNGUS (OTHER): NORMAL
CULTURE, LOWER RESPIRATORY: ABNORMAL
DIFFERENTIAL METHOD BLD: ABNORMAL
DISPENSE STATUS: NORMAL
EGFR (NO RACE VARIABLE) (RUSH/TITUS): 100 ML/MIN/1.73M2
EGFR (NO RACE VARIABLE) (RUSH/TITUS): 102 ML/MIN/1.73M2
EGFR (NO RACE VARIABLE) (RUSH/TITUS): 80 ML/MIN/1.73M2
EGFR (NO RACE VARIABLE) (RUSH/TITUS): 84 ML/MIN/1.73M2
EGFR (NO RACE VARIABLE) (RUSH/TITUS): 98 ML/MIN/1.73M2
EGFR (NO RACE VARIABLE) (RUSH/TITUS): 98 ML/MIN/1.73M2
EOSINOPHIL # BLD AUTO: 0 K/UL (ref 0–0.5)
EOSINOPHIL # BLD AUTO: 0 K/UL (ref 0–0.5)
EOSINOPHIL # BLD AUTO: 0.01 K/UL (ref 0–0.5)
EOSINOPHIL # BLD AUTO: 0.01 K/UL (ref 0–0.5)
EOSINOPHIL # BLD AUTO: 0.02 K/UL (ref 0–0.5)
EOSINOPHIL # BLD AUTO: 0.02 K/UL (ref 0–0.5)
EOSINOPHIL NFR BLD AUTO: 0 % (ref 1–4)
EOSINOPHIL NFR BLD AUTO: 0 % (ref 1–4)
EOSINOPHIL NFR BLD AUTO: 0.1 % (ref 1–4)
EOSINOPHIL NFR BLD AUTO: 0.2 % (ref 1–4)
ERYTHROCYTE [DISTWIDTH] IN BLOOD BY AUTOMATED COUNT: 16.3 % (ref 11.5–14.5)
ERYTHROCYTE [DISTWIDTH] IN BLOOD BY AUTOMATED COUNT: 16.4 % (ref 11.5–14.5)
ERYTHROCYTE [DISTWIDTH] IN BLOOD BY AUTOMATED COUNT: 16.5 % (ref 11.5–14.5)
ERYTHROCYTE [DISTWIDTH] IN BLOOD BY AUTOMATED COUNT: 17.2 % (ref 11.5–14.5)
FLUAV AG UPPER RESP QL IA.RAPID: NEGATIVE
FLUBV AG UPPER RESP QL IA.RAPID: NEGATIVE
FOLATE SERPL-MCNC: 18.9 NG/ML (ref 3.1–17.5)
GLOBULIN SER-MCNC: 4.5 G/DL (ref 2–4)
GLOBULIN SER-MCNC: 4.5 G/DL (ref 2–4)
GLOBULIN SER-MCNC: 4.7 G/DL (ref 2–4)
GLOBULIN SER-MCNC: 4.8 G/DL (ref 2–4)
GLOBULIN SER-MCNC: 4.8 G/DL (ref 2–4)
GLUCOSE SERPL-MCNC: 105 MG/DL (ref 74–106)
GLUCOSE SERPL-MCNC: 112 MG/DL (ref 74–106)
GLUCOSE SERPL-MCNC: 119 MG/DL (ref 74–106)
GLUCOSE SERPL-MCNC: 89 MG/DL (ref 74–106)
GLUCOSE SERPL-MCNC: 92 MG/DL (ref 74–106)
GLUCOSE SERPL-MCNC: 93 MG/DL (ref 74–106)
GLUCOSE UR STRIP-MCNC: NORMAL MG/DL
GRAM STN SPEC: ABNORMAL
GRAM STN SPEC: ABNORMAL
HAPTOGLOB SERPL NEPH-MCNC: 613 MG/DL (ref 30–200)
HCT VFR BLD AUTO: 21.8 % (ref 40–54)
HCT VFR BLD AUTO: 22.7 % (ref 40–54)
HCT VFR BLD AUTO: 22.8 % (ref 40–54)
HCT VFR BLD AUTO: 24.4 % (ref 40–54)
HCT VFR BLD AUTO: 25.2 % (ref 40–54)
HCT VFR BLD AUTO: 25.2 % (ref 40–54)
HCT VFR BLD AUTO: 27.3 % (ref 40–54)
HGB BLD-MCNC: 6.7 G/DL (ref 13.5–18)
HGB BLD-MCNC: 7.1 G/DL (ref 13.5–18)
HGB BLD-MCNC: 7.3 G/DL (ref 13.5–18)
HGB BLD-MCNC: 7.5 G/DL (ref 13.5–18)
HGB BLD-MCNC: 7.6 G/DL (ref 13.5–18)
HGB BLD-MCNC: 8 G/DL (ref 13.5–18)
HGB BLD-MCNC: 8.6 G/DL (ref 13.5–18)
HYALINE CASTS #/AREA URNS LPF: ABNORMAL /LPF
IMM GRANULOCYTES # BLD AUTO: 0.08 K/UL (ref 0–0.04)
IMM GRANULOCYTES # BLD AUTO: 0.09 K/UL (ref 0–0.04)
IMM GRANULOCYTES # BLD AUTO: 0.11 K/UL (ref 0–0.04)
IMM GRANULOCYTES # BLD AUTO: 0.17 K/UL (ref 0–0.04)
IMM GRANULOCYTES NFR BLD: 0.6 % (ref 0–0.4)
IMM GRANULOCYTES NFR BLD: 0.7 % (ref 0–0.4)
IMM GRANULOCYTES NFR BLD: 0.7 % (ref 0–0.4)
IMM GRANULOCYTES NFR BLD: 0.9 % (ref 0–0.4)
INDIRECT COOMBS: NORMAL
INR BLD: 1.15
IRON SATN MFR SERPL: 22 % (ref 14–50)
IRON SERPL-MCNC: 19 ΜG/DL (ref 65–175)
KETONES UR STRIP-SCNC: 10 MG/DL
LACTATE SERPL-SCNC: 2 MMOL/L (ref 0.4–2)
LACTATE SERPL-SCNC: 2.3 MMOL/L (ref 0.4–2)
LACTATE SERPL-SCNC: 2.8 MMOL/L (ref 0.4–2)
LDH SERPL-CCNC: 483 U/L (ref 87–241)
LEUKOCYTE ESTERASE UR QL STRIP: NEGATIVE
LYMPHOCYTES # BLD AUTO: 0.15 K/UL (ref 1–4.8)
LYMPHOCYTES # BLD AUTO: 0.23 K/UL (ref 1–4.8)
LYMPHOCYTES # BLD AUTO: 0.24 K/UL (ref 1–4.8)
LYMPHOCYTES # BLD AUTO: 0.25 K/UL (ref 1–4.8)
LYMPHOCYTES # BLD AUTO: 0.25 K/UL (ref 1–4.8)
LYMPHOCYTES # BLD AUTO: 0.29 K/UL (ref 1–4.8)
LYMPHOCYTES NFR BLD AUTO: 0.8 % (ref 27–41)
LYMPHOCYTES NFR BLD AUTO: 1.4 % (ref 27–41)
LYMPHOCYTES NFR BLD AUTO: 1.8 % (ref 27–41)
LYMPHOCYTES NFR BLD AUTO: 1.9 % (ref 27–41)
LYMPHOCYTES NFR BLD AUTO: 2 % (ref 27–41)
LYMPHOCYTES NFR BLD AUTO: 2.1 % (ref 27–41)
MCH RBC QN AUTO: 28.9 PG (ref 27–31)
MCH RBC QN AUTO: 29.2 PG (ref 27–31)
MCH RBC QN AUTO: 29.3 PG (ref 27–31)
MCH RBC QN AUTO: 29.5 PG (ref 27–31)
MCH RBC QN AUTO: 29.8 PG (ref 27–31)
MCH RBC QN AUTO: 29.9 PG (ref 27–31)
MCHC RBC AUTO-ENTMCNC: 30.2 G/DL (ref 32–36)
MCHC RBC AUTO-ENTMCNC: 30.7 G/DL (ref 32–36)
MCHC RBC AUTO-ENTMCNC: 30.7 G/DL (ref 32–36)
MCHC RBC AUTO-ENTMCNC: 31.1 G/DL (ref 32–36)
MCHC RBC AUTO-ENTMCNC: 31.5 G/DL (ref 32–36)
MCHC RBC AUTO-ENTMCNC: 32.2 G/DL (ref 32–36)
MCV RBC AUTO: 89.7 FL (ref 80–96)
MCV RBC AUTO: 93.5 FL (ref 80–96)
MCV RBC AUTO: 94.2 FL (ref 80–96)
MCV RBC AUTO: 96.9 FL (ref 80–96)
MCV RBC AUTO: 96.9 FL (ref 80–96)
MCV RBC AUTO: 97.2 FL (ref 80–96)
METHICILLIN RESISTANT STAPHYLOCOCCUS AUREUS: NEGATIVE
MONOCYTES # BLD AUTO: 0.79 K/UL (ref 0–0.8)
MONOCYTES # BLD AUTO: 0.87 K/UL (ref 0–0.8)
MONOCYTES # BLD AUTO: 0.97 K/UL (ref 0–0.8)
MONOCYTES # BLD AUTO: 1.01 K/UL (ref 0–0.8)
MONOCYTES # BLD AUTO: 1.18 K/UL (ref 0–0.8)
MONOCYTES # BLD AUTO: 1.19 K/UL (ref 0–0.8)
MONOCYTES NFR BLD AUTO: 4.4 % (ref 2–6)
MONOCYTES NFR BLD AUTO: 6.1 % (ref 2–6)
MONOCYTES NFR BLD AUTO: 6.3 % (ref 2–6)
MONOCYTES NFR BLD AUTO: 7.5 % (ref 2–6)
MONOCYTES NFR BLD AUTO: 8.7 % (ref 2–6)
MONOCYTES NFR BLD AUTO: 9 % (ref 2–6)
MPC BLD CALC-MCNC: 10 FL (ref 9.4–12.4)
MPC BLD CALC-MCNC: 10.1 FL (ref 9.4–12.4)
MPC BLD CALC-MCNC: 10.1 FL (ref 9.4–12.4)
MPC BLD CALC-MCNC: 10.2 FL (ref 9.4–12.4)
MPC BLD CALC-MCNC: 10.6 FL (ref 9.4–12.4)
MPC BLD CALC-MCNC: 10.7 FL (ref 9.4–12.4)
MUCOUS, UA: ABNORMAL /LPF
MYCOBACTERIUM SPEC CULT: NORMAL
NEUTROPHILS # BLD AUTO: 11.4 K/UL (ref 1.8–7.7)
NEUTROPHILS # BLD AUTO: 11.53 K/UL (ref 1.8–7.7)
NEUTROPHILS # BLD AUTO: 12.12 K/UL (ref 1.8–7.7)
NEUTROPHILS # BLD AUTO: 12.12 K/UL (ref 1.8–7.7)
NEUTROPHILS # BLD AUTO: 14.7 K/UL (ref 1.8–7.7)
NEUTROPHILS # BLD AUTO: 18.37 K/UL (ref 1.8–7.7)
NEUTROPHILS NFR BLD AUTO: 88.2 % (ref 53–65)
NEUTROPHILS NFR BLD AUTO: 88.4 % (ref 53–65)
NEUTROPHILS NFR BLD AUTO: 89.6 % (ref 53–65)
NEUTROPHILS NFR BLD AUTO: 90.8 % (ref 53–65)
NEUTROPHILS NFR BLD AUTO: 91.7 % (ref 53–65)
NEUTROPHILS NFR BLD AUTO: 93.8 % (ref 53–65)
NITRITE UR QL STRIP: NEGATIVE
NRBC # BLD AUTO: 0 X10E3/UL
NRBC, AUTO (.00): 0 %
OHS QRS DURATION: 56 MS
OHS QTC CALCULATION: 430 MS
PH UR STRIP: 5 PH UNITS
PLATELET # BLD AUTO: 310 K/UL (ref 150–400)
PLATELET # BLD AUTO: 324 K/UL (ref 150–400)
PLATELET # BLD AUTO: 351 K/UL (ref 150–400)
PLATELET # BLD AUTO: 356 K/UL (ref 150–400)
PLATELET # BLD AUTO: 428 K/UL (ref 150–400)
PLATELET # BLD AUTO: 483 K/UL (ref 150–400)
POC OCCULT BLOOD STOOL: POSITIVE
POTASSIUM SERPL-SCNC: 3.4 MMOL/L (ref 3.5–5.1)
POTASSIUM SERPL-SCNC: 3.8 MMOL/L (ref 3.5–5.1)
POTASSIUM SERPL-SCNC: 3.9 MMOL/L (ref 3.5–5.1)
POTASSIUM SERPL-SCNC: 4.2 MMOL/L (ref 3.5–5.1)
PROT SERPL-MCNC: 6 G/DL (ref 6.4–8.2)
PROT SERPL-MCNC: 6.1 G/DL (ref 6.4–8.2)
PROT SERPL-MCNC: 6.3 G/DL (ref 6.4–8.2)
PROT SERPL-MCNC: 6.3 G/DL (ref 6.4–8.2)
PROT SERPL-MCNC: 6.4 G/DL (ref 6.4–8.2)
PROT UR QL STRIP: 30
PROTHROMBIN TIME: 14.6 SECONDS (ref 11.7–14.7)
RBC # BLD AUTO: 2.25 M/UL (ref 4.6–6.2)
RBC # BLD AUTO: 2.42 M/UL (ref 4.6–6.2)
RBC # BLD AUTO: 2.51 M/UL (ref 4.6–6.2)
RBC # BLD AUTO: 2.53 M/UL (ref 4.6–6.2)
RBC # BLD AUTO: 2.6 M/UL (ref 4.6–6.2)
RBC # BLD AUTO: 2.92 M/UL (ref 4.6–6.2)
RBC # UR STRIP: NEGATIVE /UL
RH BLD: NORMAL
SARS-COV-2 RDRP RESP QL NAA+PROBE: NEGATIVE
SODIUM SERPL-SCNC: 134 MMOL/L (ref 136–145)
SODIUM SERPL-SCNC: 134 MMOL/L (ref 136–145)
SODIUM SERPL-SCNC: 135 MMOL/L (ref 136–145)
SODIUM SERPL-SCNC: 136 MMOL/L (ref 136–145)
SP GR UR STRIP: 1.02
SPECIMEN OUTDATE: NORMAL
SQUAMOUS #/AREA URNS LPF: ABNORMAL /HPF
TB INDURATION 48 - 72 HR READ: 0 MM
TIBC SERPL-MCNC: 87 ΜG/DL (ref 250–450)
UNIT NUMBER: NORMAL
UROBILINOGEN UR STRIP-ACNC: NORMAL MG/DL
VANCOMYCIN TROUGH SERPL-MCNC: 12.3 ΜG/ML (ref 10–20)
VIT B12 SERPL-MCNC: 500 PG/ML (ref 193–986)
WBC # BLD AUTO: 12.55 K/UL (ref 4.5–11)
WBC # BLD AUTO: 13.07 K/UL (ref 4.5–11)
WBC # BLD AUTO: 13.51 K/UL (ref 4.5–11)
WBC # BLD AUTO: 13.72 K/UL (ref 4.5–11)
WBC # BLD AUTO: 16.03 K/UL (ref 4.5–11)
WBC # BLD AUTO: 19.58 K/UL (ref 4.5–11)
WBC #/AREA URNS HPF: 1 /HPF

## 2024-01-01 PROCEDURE — 27000716 HC OXISENSOR PROBE, ANY SIZE: Performed by: ANESTHESIOLOGY

## 2024-01-01 PROCEDURE — 80053 COMPREHEN METABOLIC PANEL: CPT | Performed by: INTERNAL MEDICINE

## 2024-01-01 PROCEDURE — 25000003 PHARM REV CODE 250: Performed by: INTERNAL MEDICINE

## 2024-01-01 PROCEDURE — 0B9K8ZX DRAINAGE OF RIGHT LUNG, VIA NATURAL OR ARTIFICIAL OPENING ENDOSCOPIC, DIAGNOSTIC: ICD-10-PCS | Performed by: STUDENT IN AN ORGANIZED HEALTH CARE EDUCATION/TRAINING PROGRAM

## 2024-01-01 PROCEDURE — 25000003 PHARM REV CODE 250: Performed by: NURSE PRACTITIONER

## 2024-01-01 PROCEDURE — 63600175 PHARM REV CODE 636 W HCPCS: Performed by: INTERNAL MEDICINE

## 2024-01-01 PROCEDURE — 72100 X-RAY EXAM L-S SPINE 2/3 VWS: CPT | Mod: 26,,, | Performed by: ORTHOPAEDIC SURGERY

## 2024-01-01 PROCEDURE — 25000242 PHARM REV CODE 250 ALT 637 W/ HCPCS: Performed by: INTERNAL MEDICINE

## 2024-01-01 PROCEDURE — 99213 OFFICE O/P EST LOW 20 MIN: CPT | Mod: S$PBB,,, | Performed by: STUDENT IN AN ORGANIZED HEALTH CARE EDUCATION/TRAINING PROGRAM

## 2024-01-01 PROCEDURE — 25000003 PHARM REV CODE 250: Performed by: PAIN MEDICINE

## 2024-01-01 PROCEDURE — 87641 MR-STAPH DNA AMP PROBE: CPT | Performed by: INTERNAL MEDICINE

## 2024-01-01 PROCEDURE — 83010 ASSAY OF HAPTOGLOBIN QUANT: CPT | Performed by: INTERNAL MEDICINE

## 2024-01-01 PROCEDURE — 85025 COMPLETE CBC W/AUTO DIFF WBC: CPT | Performed by: INTERNAL MEDICINE

## 2024-01-01 PROCEDURE — 97166 OT EVAL MOD COMPLEX 45 MIN: CPT

## 2024-01-01 PROCEDURE — 80202 ASSAY OF VANCOMYCIN: CPT | Performed by: INTERNAL MEDICINE

## 2024-01-01 PROCEDURE — 87040 BLOOD CULTURE FOR BACTERIA: CPT | Mod: 91 | Performed by: NURSE PRACTITIONER

## 2024-01-01 PROCEDURE — 31624 DX BRONCHOSCOPE/LAVAGE: CPT | Mod: RT,,, | Performed by: STUDENT IN AN ORGANIZED HEALTH CARE EDUCATION/TRAINING PROGRAM

## 2024-01-01 PROCEDURE — 96365 THER/PROPH/DIAG IV INF INIT: CPT

## 2024-01-01 PROCEDURE — 63600175 PHARM REV CODE 636 W HCPCS: Performed by: NURSE ANESTHETIST, CERTIFIED REGISTERED

## 2024-01-01 PROCEDURE — 93005 ELECTROCARDIOGRAM TRACING: CPT

## 2024-01-01 PROCEDURE — S4991 NICOTINE PATCH NONLEGEND: HCPCS | Performed by: INTERNAL MEDICINE

## 2024-01-01 PROCEDURE — 83540 ASSAY OF IRON: CPT | Performed by: INTERNAL MEDICINE

## 2024-01-01 PROCEDURE — 97162 PT EVAL MOD COMPLEX 30 MIN: CPT

## 2024-01-01 PROCEDURE — C9113 INJ PANTOPRAZOLE SODIUM, VIA: HCPCS | Performed by: INTERNAL MEDICINE

## 2024-01-01 PROCEDURE — 94640 AIRWAY INHALATION TREATMENT: CPT

## 2024-01-01 PROCEDURE — 86923 COMPATIBILITY TEST ELECTRIC: CPT | Performed by: INTERNAL MEDICINE

## 2024-01-01 PROCEDURE — 99232 SBSQ HOSP IP/OBS MODERATE 35: CPT | Mod: ,,, | Performed by: INTERNAL MEDICINE

## 2024-01-01 PROCEDURE — 99215 OFFICE O/P EST HI 40 MIN: CPT | Mod: PBBFAC,25 | Performed by: PAIN MEDICINE

## 2024-01-01 PROCEDURE — 99233 SBSQ HOSP IP/OBS HIGH 50: CPT | Mod: ,,, | Performed by: STUDENT IN AN ORGANIZED HEALTH CARE EDUCATION/TRAINING PROGRAM

## 2024-01-01 PROCEDURE — 11000001 HC ACUTE MED/SURG PRIVATE ROOM

## 2024-01-01 PROCEDURE — 99233 SBSQ HOSP IP/OBS HIGH 50: CPT | Mod: ,,, | Performed by: INTERNAL MEDICINE

## 2024-01-01 PROCEDURE — 62323 NJX INTERLAMINAR LMBR/SAC: CPT | Mod: ,,, | Performed by: PAIN MEDICINE

## 2024-01-01 PROCEDURE — 71046 X-RAY EXAM CHEST 2 VIEWS: CPT | Mod: TC

## 2024-01-01 PROCEDURE — 25500020 PHARM REV CODE 255: Performed by: PAIN MEDICINE

## 2024-01-01 PROCEDURE — 83605 ASSAY OF LACTIC ACID: CPT | Performed by: NURSE PRACTITIONER

## 2024-01-01 PROCEDURE — 25500020 PHARM REV CODE 255: Performed by: STUDENT IN AN ORGANIZED HEALTH CARE EDUCATION/TRAINING PROGRAM

## 2024-01-01 PROCEDURE — 87077 CULTURE AEROBIC IDENTIFY: CPT | Performed by: INTERNAL MEDICINE

## 2024-01-01 PROCEDURE — 82565 ASSAY OF CREATININE: CPT

## 2024-01-01 PROCEDURE — 93010 ELECTROCARDIOGRAM REPORT: CPT | Mod: ,,, | Performed by: INTERNAL MEDICINE

## 2024-01-01 PROCEDURE — G0378 HOSPITAL OBSERVATION PER HR: HCPCS

## 2024-01-01 PROCEDURE — 30233N1 TRANSFUSION OF NONAUTOLOGOUS RED BLOOD CELLS INTO PERIPHERAL VEIN, PERCUTANEOUS APPROACH: ICD-10-PCS | Performed by: INTERNAL MEDICINE

## 2024-01-01 PROCEDURE — 99223 1ST HOSP IP/OBS HIGH 75: CPT | Mod: ,,, | Performed by: INTERNAL MEDICINE

## 2024-01-01 PROCEDURE — 99283 EMERGENCY DEPT VISIT LOW MDM: CPT | Mod: ,,, | Performed by: NURSE PRACTITIONER

## 2024-01-01 PROCEDURE — 94761 N-INVAS EAR/PLS OXIMETRY MLT: CPT

## 2024-01-01 PROCEDURE — 25000003 PHARM REV CODE 250: Performed by: NURSE ANESTHETIST, CERTIFIED REGISTERED

## 2024-01-01 PROCEDURE — 86901 BLOOD TYPING SEROLOGIC RH(D): CPT | Performed by: INTERNAL MEDICINE

## 2024-01-01 PROCEDURE — 63600175 PHARM REV CODE 636 W HCPCS: Performed by: NURSE PRACTITIONER

## 2024-01-01 PROCEDURE — 99900035 HC TECH TIME PER 15 MIN (STAT)

## 2024-01-01 PROCEDURE — 31624 DX BRONCHOSCOPE/LAVAGE: CPT | Performed by: STUDENT IN AN ORGANIZED HEALTH CARE EDUCATION/TRAINING PROGRAM

## 2024-01-01 PROCEDURE — D9220A PRA ANESTHESIA: Mod: ANES,,, | Performed by: ANESTHESIOLOGY

## 2024-01-01 PROCEDURE — 99204 OFFICE O/P NEW MOD 45 MIN: CPT | Mod: S$PBB,25,, | Performed by: PAIN MEDICINE

## 2024-01-01 PROCEDURE — 99283 EMERGENCY DEPT VISIT LOW MDM: CPT

## 2024-01-01 PROCEDURE — 72100 X-RAY EXAM L-S SPINE 2/3 VWS: CPT | Mod: TC

## 2024-01-01 PROCEDURE — 99285 EMERGENCY DEPT VISIT HI MDM: CPT | Mod: 25

## 2024-01-01 PROCEDURE — 99239 HOSP IP/OBS DSCHRG MGMT >30: CPT | Mod: ,,, | Performed by: INTERNAL MEDICINE

## 2024-01-01 PROCEDURE — 27000284 HC CANNULA NASAL: Performed by: ANESTHESIOLOGY

## 2024-01-01 PROCEDURE — 87804 INFLUENZA ASSAY W/OPTIC: CPT | Performed by: NURSE PRACTITIONER

## 2024-01-01 PROCEDURE — 97530 THERAPEUTIC ACTIVITIES: CPT

## 2024-01-01 PROCEDURE — 27000165 HC TUBE, ETT CUFFED: Performed by: ANESTHESIOLOGY

## 2024-01-01 PROCEDURE — 30200315 PPD INTRADERMAL TEST REV CODE 302: Performed by: INTERNAL MEDICINE

## 2024-01-01 PROCEDURE — 99214 OFFICE O/P EST MOD 30 MIN: CPT | Mod: PBBFAC,25 | Performed by: STUDENT IN AN ORGANIZED HEALTH CARE EDUCATION/TRAINING PROGRAM

## 2024-01-01 PROCEDURE — 96361 HYDRATE IV INFUSION ADD-ON: CPT

## 2024-01-01 PROCEDURE — 99215 OFFICE O/P EST HI 40 MIN: CPT | Mod: PBBFAC | Performed by: STUDENT IN AN ORGANIZED HEALTH CARE EDUCATION/TRAINING PROGRAM

## 2024-01-01 PROCEDURE — 63600175 PHARM REV CODE 636 W HCPCS: Performed by: PAIN MEDICINE

## 2024-01-01 PROCEDURE — P9016 RBC LEUKOCYTES REDUCED: HCPCS | Performed by: INTERNAL MEDICINE

## 2024-01-01 PROCEDURE — 96367 TX/PROPH/DG ADDL SEQ IV INF: CPT

## 2024-01-01 PROCEDURE — 99499 UNLISTED E&M SERVICE: CPT | Mod: ,,, | Performed by: FAMILY MEDICINE

## 2024-01-01 PROCEDURE — 99214 OFFICE O/P EST MOD 30 MIN: CPT | Mod: S$PBB,,, | Performed by: ORTHOPAEDIC SURGERY

## 2024-01-01 PROCEDURE — 71260 CT THORAX DX C+: CPT | Mod: TC

## 2024-01-01 PROCEDURE — 27000510 HC BLANKET BAIR HUGGER ANY SIZE: Performed by: ANESTHESIOLOGY

## 2024-01-01 PROCEDURE — 85730 THROMBOPLASTIN TIME PARTIAL: CPT | Performed by: STUDENT IN AN ORGANIZED HEALTH CARE EDUCATION/TRAINING PROGRAM

## 2024-01-01 PROCEDURE — 82746 ASSAY OF FOLIC ACID SERUM: CPT | Performed by: INTERNAL MEDICINE

## 2024-01-01 PROCEDURE — 87102 FUNGUS ISOLATION CULTURE: CPT | Performed by: INTERNAL MEDICINE

## 2024-01-01 PROCEDURE — 86580 TB INTRADERMAL TEST: CPT | Performed by: INTERNAL MEDICINE

## 2024-01-01 PROCEDURE — 27000655: Performed by: ANESTHESIOLOGY

## 2024-01-01 PROCEDURE — 82272 OCCULT BLD FECES 1-3 TESTS: CPT

## 2024-01-01 PROCEDURE — 99222 1ST HOSP IP/OBS MODERATE 55: CPT | Mod: ,,, | Performed by: INTERNAL MEDICINE

## 2024-01-01 PROCEDURE — 80048 BASIC METABOLIC PNL TOTAL CA: CPT | Performed by: NURSE PRACTITIONER

## 2024-01-01 PROCEDURE — 62323 NJX INTERLAMINAR LMBR/SAC: CPT | Performed by: PAIN MEDICINE

## 2024-01-01 PROCEDURE — 83615 LACTATE (LD) (LDH) ENZYME: CPT | Performed by: INTERNAL MEDICINE

## 2024-01-01 PROCEDURE — 99212 OFFICE O/P EST SF 10 MIN: CPT | Mod: PBBFAC,25 | Performed by: ORTHOPAEDIC SURGERY

## 2024-01-01 PROCEDURE — 99999PBSHW PR PBB SHADOW TECHNICAL ONLY FILED TO HB: Mod: PBBFAC,,,

## 2024-01-01 PROCEDURE — 99285 EMERGENCY DEPT VISIT HI MDM: CPT | Mod: ,,, | Performed by: NURSE PRACTITIONER

## 2024-01-01 PROCEDURE — 37000008 HC ANESTHESIA 1ST 15 MINUTES: Performed by: PAIN MEDICINE

## 2024-01-01 PROCEDURE — 85610 PROTHROMBIN TIME: CPT | Performed by: STUDENT IN AN ORGANIZED HEALTH CARE EDUCATION/TRAINING PROGRAM

## 2024-01-01 PROCEDURE — 71046 X-RAY EXAM CHEST 2 VIEWS: CPT | Mod: 26,,, | Performed by: RADIOLOGY

## 2024-01-01 PROCEDURE — 96372 THER/PROPH/DIAG INJ SC/IM: CPT | Mod: PBBFAC | Performed by: PAIN MEDICINE

## 2024-01-01 PROCEDURE — 87116 MYCOBACTERIA CULTURE: CPT | Performed by: INTERNAL MEDICINE

## 2024-01-01 PROCEDURE — 81001 URINALYSIS AUTO W/SCOPE: CPT | Performed by: NURSE PRACTITIONER

## 2024-01-01 PROCEDURE — 27000689 HC BLADE LARYNGOSCOPE ANY SIZE: Performed by: ANESTHESIOLOGY

## 2024-01-01 PROCEDURE — D9220A PRA ANESTHESIA: Mod: 23,,, | Performed by: NURSE ANESTHETIST, CERTIFIED REGISTERED

## 2024-01-01 PROCEDURE — 99211 OFF/OP EST MAY X REQ PHY/QHP: CPT | Mod: PBBFAC,25 | Performed by: ORTHOPAEDIC SURGERY

## 2024-01-01 PROCEDURE — 85018 HEMOGLOBIN: CPT | Performed by: INTERNAL MEDICINE

## 2024-01-01 PROCEDURE — D9220A PRA ANESTHESIA: Mod: CRNA,,, | Performed by: NURSE ANESTHETIST, CERTIFIED REGISTERED

## 2024-01-01 PROCEDURE — 87635 SARS-COV-2 COVID-19 AMP PRB: CPT | Performed by: NURSE PRACTITIONER

## 2024-01-01 PROCEDURE — 85025 COMPLETE CBC W/AUTO DIFF WBC: CPT | Performed by: NURSE PRACTITIONER

## 2024-01-01 PROCEDURE — 71260 CT THORAX DX C+: CPT | Mod: 26,,, | Performed by: STUDENT IN AN ORGANIZED HEALTH CARE EDUCATION/TRAINING PROGRAM

## 2024-01-01 RX ORDER — FENTANYL CITRATE 50 UG/ML
INJECTION, SOLUTION INTRAMUSCULAR; INTRAVENOUS
Status: DISCONTINUED | OUTPATIENT
Start: 2024-01-01 | End: 2024-01-01

## 2024-01-01 RX ORDER — IBUPROFEN 200 MG
1 TABLET ORAL DAILY
Status: DISCONTINUED | OUTPATIENT
Start: 2024-01-01 | End: 2024-01-01 | Stop reason: HOSPADM

## 2024-01-01 RX ORDER — IPRATROPIUM BROMIDE AND ALBUTEROL SULFATE 2.5; .5 MG/3ML; MG/3ML
3 SOLUTION RESPIRATORY (INHALATION) ONCE
Status: DISCONTINUED | OUTPATIENT
Start: 2024-01-01 | End: 2024-01-01 | Stop reason: HOSPADM

## 2024-01-01 RX ORDER — HYDROCODONE BITARTRATE AND ACETAMINOPHEN 10; 325 MG/1; MG/1
1 TABLET ORAL EVERY 6 HOURS PRN
Refills: 0
Start: 2024-01-01 | End: 2024-01-01

## 2024-01-01 RX ORDER — PROPOFOL 10 MG/ML
VIAL (ML) INTRAVENOUS
Status: DISCONTINUED | OUTPATIENT
Start: 2024-01-01 | End: 2024-01-01

## 2024-01-01 RX ORDER — MEPERIDINE HYDROCHLORIDE 25 MG/ML
25 INJECTION INTRAMUSCULAR; INTRAVENOUS; SUBCUTANEOUS EVERY 10 MIN PRN
Status: ACTIVE | OUTPATIENT
Start: 2024-01-01 | End: 2024-01-01

## 2024-01-01 RX ORDER — OXYCODONE AND ACETAMINOPHEN 10; 325 MG/1; MG/1
1 TABLET ORAL EVERY 8 HOURS PRN
Qty: 45 TABLET | Refills: 0 | Status: SHIPPED | OUTPATIENT
Start: 2024-05-19 | End: 2024-06-03

## 2024-01-01 RX ORDER — IPRATROPIUM BROMIDE AND ALBUTEROL SULFATE 2.5; .5 MG/3ML; MG/3ML
3 SOLUTION RESPIRATORY (INHALATION) EVERY 4 HOURS PRN
Status: DISCONTINUED | OUTPATIENT
Start: 2024-01-01 | End: 2024-01-01 | Stop reason: HOSPADM

## 2024-01-01 RX ORDER — LIDOCAINE HYDROCHLORIDE 20 MG/ML
INJECTION, SOLUTION EPIDURAL; INFILTRATION; INTRACAUDAL; PERINEURAL
Status: DISCONTINUED | OUTPATIENT
Start: 2024-01-01 | End: 2024-01-01

## 2024-01-01 RX ORDER — POLYETHYLENE GLYCOL 3350 17 G/17G
17 POWDER, FOR SOLUTION ORAL 2 TIMES DAILY PRN
COMMUNITY

## 2024-01-01 RX ORDER — POTASSIUM CHLORIDE 20 MEQ/1
20 TABLET, EXTENDED RELEASE ORAL ONCE
Status: COMPLETED | OUTPATIENT
Start: 2024-01-01 | End: 2024-01-01

## 2024-01-01 RX ORDER — TRIAMCINOLONE ACETONIDE 40 MG/ML
INJECTION, SUSPENSION INTRA-ARTICULAR; INTRAMUSCULAR CODE/TRAUMA/SEDATION MEDICATION
Status: DISCONTINUED | OUTPATIENT
Start: 2024-01-01 | End: 2024-01-01 | Stop reason: HOSPADM

## 2024-01-01 RX ORDER — SODIUM CHLORIDE 9 MG/ML
INJECTION, SOLUTION INTRAVENOUS CONTINUOUS
Status: DISPENSED | OUTPATIENT
Start: 2024-01-01 | End: 2024-01-01

## 2024-01-01 RX ORDER — AMLODIPINE BESYLATE 5 MG/1
5 TABLET ORAL DAILY
Status: DISCONTINUED | OUTPATIENT
Start: 2024-01-01 | End: 2024-01-01 | Stop reason: HOSPADM

## 2024-01-01 RX ORDER — DOCUSATE SODIUM 100 MG/1
100 CAPSULE, LIQUID FILLED ORAL 2 TIMES DAILY
COMMUNITY

## 2024-01-01 RX ORDER — SODIUM CHLORIDE 0.9 % (FLUSH) 0.9 %
10 SYRINGE (ML) INJECTION EVERY 12 HOURS PRN
Status: DISCONTINUED | OUTPATIENT
Start: 2024-01-01 | End: 2024-01-01 | Stop reason: HOSPADM

## 2024-01-01 RX ORDER — IBUPROFEN 200 MG
1 TABLET ORAL DAILY
Start: 2024-01-01 | End: 2024-01-01 | Stop reason: CLARIF

## 2024-01-01 RX ORDER — LEVALBUTEROL INHALATION SOLUTION 1.25 MG/3ML
1.25 SOLUTION RESPIRATORY (INHALATION) EVERY 8 HOURS
Status: DISCONTINUED | OUTPATIENT
Start: 2024-01-01 | End: 2024-01-01 | Stop reason: HOSPADM

## 2024-01-01 RX ORDER — HYDROCODONE BITARTRATE AND ACETAMINOPHEN 5; 325 MG/1; MG/1
1 TABLET ORAL EVERY 6 HOURS PRN
Status: DISCONTINUED | OUTPATIENT
Start: 2024-01-01 | End: 2024-01-01 | Stop reason: HOSPADM

## 2024-01-01 RX ORDER — HYDROCODONE BITARTRATE AND ACETAMINOPHEN 10; 325 MG/1; MG/1
1 TABLET ORAL EVERY 6 HOURS PRN
COMMUNITY
Start: 2024-01-01 | End: 2024-01-01 | Stop reason: CLARIF

## 2024-01-01 RX ORDER — CEFDINIR 300 MG/1
300 CAPSULE ORAL 2 TIMES DAILY
Qty: 4 CAPSULE | Refills: 0
Start: 2024-01-01 | End: 2024-01-01

## 2024-01-01 RX ORDER — ACETAMINOPHEN 325 MG/1
325 TABLET ORAL EVERY 6 HOURS PRN
COMMUNITY

## 2024-01-01 RX ORDER — KETOROLAC TROMETHAMINE 30 MG/ML
60 INJECTION, SOLUTION INTRAMUSCULAR; INTRAVENOUS
Status: COMPLETED | OUTPATIENT
Start: 2024-01-01 | End: 2024-01-01

## 2024-01-01 RX ORDER — PANTOPRAZOLE SODIUM 40 MG/10ML
40 INJECTION, POWDER, LYOPHILIZED, FOR SOLUTION INTRAVENOUS DAILY
Status: DISCONTINUED | OUTPATIENT
Start: 2024-01-01 | End: 2024-01-01

## 2024-01-01 RX ORDER — NALOXONE HCL 0.4 MG/ML
0.02 VIAL (ML) INJECTION
Status: DISCONTINUED | OUTPATIENT
Start: 2024-01-01 | End: 2024-01-01 | Stop reason: HOSPADM

## 2024-01-01 RX ORDER — NAPROXEN SODIUM 220 MG/1
81 TABLET, FILM COATED ORAL DAILY
Status: DISCONTINUED | OUTPATIENT
Start: 2024-01-01 | End: 2024-01-01

## 2024-01-01 RX ORDER — HYDROCODONE BITARTRATE AND ACETAMINOPHEN 5; 325 MG/1; MG/1
1 TABLET ORAL EVERY 6 HOURS PRN
COMMUNITY
Start: 2024-01-01 | End: 2024-01-01 | Stop reason: CLARIF

## 2024-01-01 RX ORDER — IPRATROPIUM BROMIDE AND ALBUTEROL SULFATE 2.5; .5 MG/3ML; MG/3ML
3 SOLUTION RESPIRATORY (INHALATION)
Status: DISCONTINUED | OUTPATIENT
Start: 2024-01-01 | End: 2024-01-01

## 2024-01-01 RX ORDER — ROCURONIUM BROMIDE 10 MG/ML
INJECTION, SOLUTION INTRAVENOUS
Status: DISCONTINUED | OUTPATIENT
Start: 2024-01-01 | End: 2024-01-01

## 2024-01-01 RX ORDER — HYDROCODONE BITARTRATE AND ACETAMINOPHEN 500; 5 MG/1; MG/1
TABLET ORAL
Status: DISCONTINUED | OUTPATIENT
Start: 2024-01-01 | End: 2024-01-01 | Stop reason: HOSPADM

## 2024-01-01 RX ORDER — DIPHENHYDRAMINE HYDROCHLORIDE 50 MG/ML
25 INJECTION INTRAMUSCULAR; INTRAVENOUS EVERY 6 HOURS PRN
Status: DISCONTINUED | OUTPATIENT
Start: 2024-01-01 | End: 2024-01-01 | Stop reason: HOSPADM

## 2024-01-01 RX ORDER — FINASTERIDE 5 MG/1
5 TABLET, FILM COATED ORAL DAILY
Status: DISCONTINUED | OUTPATIENT
Start: 2024-01-01 | End: 2024-01-01 | Stop reason: HOSPADM

## 2024-01-01 RX ORDER — ONDANSETRON HYDROCHLORIDE 2 MG/ML
INJECTION, SOLUTION INTRAVENOUS
Status: DISCONTINUED | OUTPATIENT
Start: 2024-01-01 | End: 2024-01-01

## 2024-01-01 RX ORDER — MORPHINE SULFATE 10 MG/ML
4 INJECTION INTRAMUSCULAR; INTRAVENOUS; SUBCUTANEOUS EVERY 5 MIN PRN
Status: DISCONTINUED | OUTPATIENT
Start: 2024-01-01 | End: 2024-01-01 | Stop reason: HOSPADM

## 2024-01-01 RX ORDER — OXYCODONE AND ACETAMINOPHEN 10; 325 MG/1; MG/1
1 TABLET ORAL EVERY 8 HOURS PRN
Qty: 45 TABLET | Refills: 0 | Status: SHIPPED | OUTPATIENT
Start: 2024-05-19 | End: 2024-01-01

## 2024-01-01 RX ORDER — HYDROMORPHONE HYDROCHLORIDE 2 MG/ML
0.5 INJECTION, SOLUTION INTRAMUSCULAR; INTRAVENOUS; SUBCUTANEOUS EVERY 5 MIN PRN
Status: DISCONTINUED | OUTPATIENT
Start: 2024-01-01 | End: 2024-01-01 | Stop reason: HOSPADM

## 2024-01-01 RX ORDER — ACETAMINOPHEN 325 MG/1
650 TABLET ORAL EVERY 4 HOURS PRN
Status: DISCONTINUED | OUTPATIENT
Start: 2024-01-01 | End: 2024-01-01 | Stop reason: HOSPADM

## 2024-01-01 RX ORDER — ONDANSETRON HYDROCHLORIDE 2 MG/ML
4 INJECTION, SOLUTION INTRAVENOUS EVERY 8 HOURS PRN
Status: DISCONTINUED | OUTPATIENT
Start: 2024-01-01 | End: 2024-01-01 | Stop reason: HOSPADM

## 2024-01-01 RX ORDER — PANTOPRAZOLE SODIUM 40 MG/10ML
40 INJECTION, POWDER, LYOPHILIZED, FOR SOLUTION INTRAVENOUS 2 TIMES DAILY
Status: DISCONTINUED | OUTPATIENT
Start: 2024-01-01 | End: 2024-01-01

## 2024-01-01 RX ORDER — FENTANYL 12.5 UG/1
1 PATCH TRANSDERMAL
Status: ON HOLD | COMMUNITY
Start: 2024-01-01 | End: 2024-01-01 | Stop reason: HOSPADM

## 2024-01-01 RX ORDER — MIDAZOLAM HYDROCHLORIDE 1 MG/ML
INJECTION INTRAMUSCULAR; INTRAVENOUS
Status: DISCONTINUED | OUTPATIENT
Start: 2024-01-01 | End: 2024-01-01

## 2024-01-01 RX ORDER — ATORVASTATIN CALCIUM 40 MG/1
40 TABLET, FILM COATED ORAL DAILY
Status: DISCONTINUED | OUTPATIENT
Start: 2024-01-01 | End: 2024-01-01 | Stop reason: HOSPADM

## 2024-01-01 RX ORDER — ALUMINUM HYDROXIDE, MAGNESIUM HYDROXIDE, AND SIMETHICONE 1200; 120; 1200 MG/30ML; MG/30ML; MG/30ML
30 SUSPENSION ORAL 4 TIMES DAILY PRN
Status: DISCONTINUED | OUTPATIENT
Start: 2024-01-01 | End: 2024-01-01 | Stop reason: HOSPADM

## 2024-01-01 RX ORDER — ALBUTEROL SULFATE 90 UG/1
2 AEROSOL, METERED RESPIRATORY (INHALATION) EVERY 4 HOURS PRN
Qty: 54 G | Refills: 1 | Status: SHIPPED | OUTPATIENT
Start: 2024-01-01

## 2024-01-01 RX ORDER — SODIUM CHLORIDE 9 MG/ML
INJECTION, SOLUTION INTRAVENOUS CONTINUOUS
Status: DISCONTINUED | OUTPATIENT
Start: 2024-01-01 | End: 2024-01-01 | Stop reason: HOSPADM

## 2024-01-01 RX ORDER — HYDROCODONE BITARTRATE AND ACETAMINOPHEN 10; 325 MG/1; MG/1
1 TABLET ORAL EVERY 6 HOURS PRN
Status: ON HOLD | COMMUNITY
Start: 2024-01-01 | End: 2024-01-01

## 2024-01-01 RX ORDER — PHENYLEPHRINE HYDROCHLORIDE 10 MG/ML
INJECTION INTRAVENOUS
Status: DISCONTINUED | OUTPATIENT
Start: 2024-01-01 | End: 2024-01-01

## 2024-01-01 RX ORDER — ENOXAPARIN SODIUM 100 MG/ML
40 INJECTION SUBCUTANEOUS EVERY 24 HOURS
Status: DISCONTINUED | OUTPATIENT
Start: 2024-01-01 | End: 2024-01-01

## 2024-01-01 RX ORDER — TRAZODONE HYDROCHLORIDE 50 MG/1
50 TABLET ORAL NIGHTLY PRN
Status: DISCONTINUED | OUTPATIENT
Start: 2024-01-01 | End: 2024-01-01 | Stop reason: HOSPADM

## 2024-01-01 RX ORDER — ONDANSETRON HYDROCHLORIDE 2 MG/ML
4 INJECTION, SOLUTION INTRAVENOUS DAILY PRN
Status: DISCONTINUED | OUTPATIENT
Start: 2024-01-01 | End: 2024-01-01 | Stop reason: HOSPADM

## 2024-01-01 RX ADMIN — VANCOMYCIN HYDROCHLORIDE 1250 MG: 1 INJECTION, POWDER, LYOPHILIZED, FOR SOLUTION INTRAVENOUS at 12:02

## 2024-01-01 RX ADMIN — SODIUM CHLORIDE: 9 INJECTION, SOLUTION INTRAVENOUS at 12:04

## 2024-01-01 RX ADMIN — FINASTERIDE 5 MG: 5 TABLET, FILM COATED ORAL at 01:02

## 2024-01-01 RX ADMIN — AZITHROMYCIN MONOHYDRATE 500 MG: 500 INJECTION, POWDER, LYOPHILIZED, FOR SOLUTION INTRAVENOUS at 03:02

## 2024-01-01 RX ADMIN — AZITHROMYCIN MONOHYDRATE 500 MG: 500 INJECTION, POWDER, LYOPHILIZED, FOR SOLUTION INTRAVENOUS at 04:02

## 2024-01-01 RX ADMIN — PANTOPRAZOLE SODIUM 40 MG: 40 INJECTION, POWDER, FOR SOLUTION INTRAVENOUS at 01:02

## 2024-01-01 RX ADMIN — HYDROCODONE BITARTRATE AND ACETAMINOPHEN 1 TABLET: 5; 325 TABLET ORAL at 09:02

## 2024-01-01 RX ADMIN — LIDOCAINE HYDROCHLORIDE 20 MG: 20 INJECTION, SOLUTION INTRAVENOUS at 12:04

## 2024-01-01 RX ADMIN — TUBERCULIN PURIFIED PROTEIN DERIVATIVE 5 UNITS: 5 INJECTION, SOLUTION INTRADERMAL at 12:02

## 2024-01-01 RX ADMIN — SODIUM CHLORIDE: 9 INJECTION, SOLUTION INTRAVENOUS at 09:02

## 2024-01-01 RX ADMIN — HYDROCODONE BITARTRATE AND ACETAMINOPHEN 1 TABLET: 5; 325 TABLET ORAL at 04:02

## 2024-01-01 RX ADMIN — LEVALBUTEROL HYDROCHLORIDE 1.25 MG: 1.25 SOLUTION RESPIRATORY (INHALATION) at 08:02

## 2024-01-01 RX ADMIN — TRAZODONE HYDROCHLORIDE 50 MG: 50 TABLET ORAL at 08:02

## 2024-01-01 RX ADMIN — LEVALBUTEROL HYDROCHLORIDE 1.25 MG: 1.25 SOLUTION RESPIRATORY (INHALATION) at 11:02

## 2024-01-01 RX ADMIN — FINASTERIDE 5 MG: 5 TABLET, FILM COATED ORAL at 08:02

## 2024-01-01 RX ADMIN — AMLODIPINE BESYLATE 5 MG: 5 TABLET ORAL at 08:02

## 2024-01-01 RX ADMIN — ONDANSETRON 8 MG: 2 INJECTION INTRAMUSCULAR; INTRAVENOUS at 10:02

## 2024-01-01 RX ADMIN — LEVALBUTEROL HYDROCHLORIDE 1.25 MG: 1.25 SOLUTION RESPIRATORY (INHALATION) at 03:02

## 2024-01-01 RX ADMIN — IOPAMIDOL 100 ML: 755 INJECTION, SOLUTION INTRAVENOUS at 10:03

## 2024-01-01 RX ADMIN — ATORVASTATIN CALCIUM 40 MG: 40 TABLET, FILM COATED ORAL at 08:02

## 2024-01-01 RX ADMIN — NICOTINE 1 PATCH: 14 PATCH, EXTENDED RELEASE TRANSDERMAL at 01:02

## 2024-01-01 RX ADMIN — ENOXAPARIN SODIUM 40 MG: 100 INJECTION SUBCUTANEOUS at 04:02

## 2024-01-01 RX ADMIN — DEXTROSE MONOHYDRATE 2 G: 5 INJECTION INTRAVENOUS at 06:02

## 2024-01-01 RX ADMIN — SUGAMMADEX 200 MG: 100 INJECTION, SOLUTION INTRAVENOUS at 10:02

## 2024-01-01 RX ADMIN — VANCOMYCIN HYDROCHLORIDE 1250 MG: 1 INJECTION, POWDER, LYOPHILIZED, FOR SOLUTION INTRAVENOUS at 06:02

## 2024-01-01 RX ADMIN — FENTANYL CITRATE 100 MCG: 50 INJECTION INTRAMUSCULAR; INTRAVENOUS at 10:02

## 2024-01-01 RX ADMIN — PANTOPRAZOLE SODIUM 40 MG: 40 INJECTION, POWDER, FOR SOLUTION INTRAVENOUS at 08:02

## 2024-01-01 RX ADMIN — ENOXAPARIN SODIUM 40 MG: 100 INJECTION SUBCUTANEOUS at 05:02

## 2024-01-01 RX ADMIN — NICOTINE 1 PATCH: 14 PATCH, EXTENDED RELEASE TRANSDERMAL at 08:02

## 2024-01-01 RX ADMIN — KETOROLAC TROMETHAMINE 60 MG: 30 INJECTION, SOLUTION INTRAMUSCULAR at 11:03

## 2024-01-01 RX ADMIN — ROCURONIUM BROMIDE 50 MG: 10 INJECTION, SOLUTION INTRAVENOUS at 10:02

## 2024-01-01 RX ADMIN — DEXTROSE MONOHYDRATE 2 G: 5 INJECTION INTRAVENOUS at 03:02

## 2024-01-01 RX ADMIN — MIDAZOLAM HYDROCHLORIDE 2 MG: 1 INJECTION, SOLUTION INTRAMUSCULAR; INTRAVENOUS at 09:02

## 2024-01-01 RX ADMIN — ATORVASTATIN CALCIUM 40 MG: 40 TABLET, FILM COATED ORAL at 01:02

## 2024-01-01 RX ADMIN — HYDROCODONE BITARTRATE AND ACETAMINOPHEN 1 TABLET: 5; 325 TABLET ORAL at 08:02

## 2024-01-01 RX ADMIN — AZITHROMYCIN DIHYDRATE 500 MG: 500 INJECTION, POWDER, LYOPHILIZED, FOR SOLUTION INTRAVENOUS at 04:02

## 2024-01-01 RX ADMIN — ASPIRIN 81 MG CHEWABLE TABLET 81 MG: 81 TABLET CHEWABLE at 08:02

## 2024-01-01 RX ADMIN — SODIUM CHLORIDE 1000 ML: 9 INJECTION, SOLUTION INTRAVENOUS at 03:02

## 2024-01-01 RX ADMIN — PROPOFOL 150 MG: 10 INJECTION, EMULSION INTRAVENOUS at 10:02

## 2024-01-01 RX ADMIN — LEVALBUTEROL HYDROCHLORIDE 1.25 MG: 1.25 SOLUTION RESPIRATORY (INHALATION) at 07:02

## 2024-01-01 RX ADMIN — IPRATROPIUM BROMIDE AND ALBUTEROL SULFATE 3 ML: .5; 3 SOLUTION RESPIRATORY (INHALATION) at 08:02

## 2024-01-01 RX ADMIN — LEVALBUTEROL HYDROCHLORIDE 1.25 MG: 1.25 SOLUTION RESPIRATORY (INHALATION) at 12:02

## 2024-01-01 RX ADMIN — AMLODIPINE BESYLATE 5 MG: 5 TABLET ORAL at 01:02

## 2024-01-01 RX ADMIN — SODIUM CHLORIDE: 9 INJECTION, SOLUTION INTRAVENOUS at 12:02

## 2024-01-01 RX ADMIN — POTASSIUM CHLORIDE 20 MEQ: 1500 TABLET, EXTENDED RELEASE ORAL at 01:02

## 2024-01-01 RX ADMIN — DEXTROSE MONOHYDRATE 2 G: 5 INJECTION INTRAVENOUS at 04:02

## 2024-01-01 RX ADMIN — DEXTROSE MONOHYDRATE 2 G: 5 INJECTION INTRAVENOUS at 05:02

## 2024-01-01 RX ADMIN — PANTOPRAZOLE SODIUM 40 MG: 40 INJECTION, POWDER, FOR SOLUTION INTRAVENOUS at 03:02

## 2024-01-01 RX ADMIN — LEVALBUTEROL HYDROCHLORIDE 1.25 MG: 1.25 SOLUTION RESPIRATORY (INHALATION) at 04:02

## 2024-01-01 RX ADMIN — SODIUM CHLORIDE 1000 ML: 9 INJECTION, SOLUTION INTRAVENOUS at 01:02

## 2024-01-01 RX ADMIN — HYDROCODONE BITARTRATE AND ACETAMINOPHEN 1 TABLET: 5; 325 TABLET ORAL at 07:02

## 2024-01-01 RX ADMIN — LIDOCAINE HYDROCHLORIDE 60 MG: 20 INJECTION, SOLUTION INTRAVENOUS at 10:02

## 2024-01-01 RX ADMIN — IPRATROPIUM BROMIDE AND ALBUTEROL SULFATE 3 ML: .5; 3 SOLUTION RESPIRATORY (INHALATION) at 07:02

## 2024-01-01 RX ADMIN — HYDROCODONE BITARTRATE AND ACETAMINOPHEN 1 TABLET: 5; 325 TABLET ORAL at 12:02

## 2024-01-01 RX ADMIN — PROPOFOL 30 MG: 10 INJECTION, EMULSION INTRAVENOUS at 12:04

## 2024-01-01 RX ADMIN — PHENYLEPHRINE HYDROCHLORIDE 100 MCG: 10 INJECTION INTRAVENOUS at 10:02

## 2024-01-09 NOTE — TELEPHONE ENCOUNTER
----- Message from Dandre Barcenas sent at 1/9/2024  1:28 PM CST -----  albuterol (VENTOLIN HFA) 90 mcg/actuation inhaler to [facility]

## 2024-02-03 PROBLEM — R35.0 BENIGN PROSTATIC HYPERPLASIA WITH URINARY FREQUENCY: Status: ACTIVE | Noted: 2024-02-03

## 2024-02-03 PROBLEM — F17.200 TOBACCO DEPENDENCE: Status: ACTIVE | Noted: 2024-02-03

## 2024-02-03 PROBLEM — D64.9 NORMOCYTIC ANEMIA: Status: ACTIVE | Noted: 2024-02-03

## 2024-02-03 PROBLEM — R53.1 GENERALIZED WEAKNESS: Status: ACTIVE | Noted: 2024-01-01

## 2024-02-03 PROBLEM — J18.9 SEPSIS DUE TO PNEUMONIA: Status: ACTIVE | Noted: 2024-02-03

## 2024-02-03 PROBLEM — N40.1 BENIGN PROSTATIC HYPERPLASIA WITH URINARY FREQUENCY: Status: ACTIVE | Noted: 2024-02-03

## 2024-02-03 PROBLEM — A41.9 SEPSIS DUE TO PNEUMONIA: Status: ACTIVE | Noted: 2024-02-03

## 2024-02-03 PROBLEM — J90 PLEURAL EFFUSION: Status: ACTIVE | Noted: 2024-02-03

## 2024-02-03 PROBLEM — Z66 DNR (DO NOT RESUSCITATE): Status: ACTIVE | Noted: 2024-02-03

## 2024-02-03 NOTE — PROGRESS NOTES
Pharmacy consulted to assist with the management of vancomycin in this patient to treat: possible pneumonia    Patient weight: 59 kg  Patient CrCl: ~ 72 ml/min calculated    Will begin vancomycin: 1250mg every 18 hours    Vancomycin level ordered.     Pharmacy will continue to monitor and make adjustments as needed.      Thank you for the consult,    Radha Espinosa, PharmD  [phone]

## 2024-02-03 NOTE — ASSESSMENT & PLAN NOTE
Discussed code status on admission, in the presence of ED RN.   Confirms DNR and do not intubate.

## 2024-02-03 NOTE — SUBJECTIVE & OBJECTIVE
Past Medical History:   Diagnosis Date    Adenocarcinoma of lung     Dx 2/22/23    Chronic bilateral low back pain with right-sided sciatica     Chronic pain of right knee     Essential (primary) hypertension     Hypercholesteremia     Nicotine dependence, cigarettes, uncomplicated     Seizures 2020    last was approx 7 mths ago    Stroke 2015    with residual right sided weakness       Past Surgical History:   Procedure Laterality Date    KNEE ARTHROSCOPY Right 1995       Review of patient's allergies indicates:   Allergen Reactions    Salsalate      Other reaction(s): Abdominal pain    Tramadol      Other reaction(s): Seizure       No current facility-administered medications on file prior to encounter.     Current Outpatient Medications on File Prior to Encounter   Medication Sig    albuterol (VENTOLIN HFA) 90 mcg/actuation inhaler Inhale 2 puffs into the lungs every 4 (four) hours as needed for Wheezing or Shortness of Breath. Rescue    albuterol-ipratropium (DUO-NEB) 2.5 mg-0.5 mg/3 mL nebulizer solution Take 3 mLs by nebulization every 6 (six) hours as needed for Wheezing or Shortness of Breath. Rescue    aspirin 81 mg Cap Take 81 mg by mouth once daily.    atorvastatin (LIPITOR) 40 MG tablet Take 1 tablet (40 mg total) by mouth once daily.    fentaNYL (DURAGESIC) 12 mcg/hr PT72 Place 1 patch onto the skin every 72 hours.    finasteride (PROSCAR) 5 mg tablet Take 5 mg by mouth once daily.    HYDROcodone-acetaminophen (NORCO)  mg per tablet Take 1 tablet by mouth every 6 (six) hours as needed for Pain.    mometasone (ASMANEX HFA) 200 mcg/actuation HFAA Inhale 2 puffs into the lungs 2 (two) times a day. Controller    amLODIPine (NORVASC) 5 MG tablet Take 1 tablet (5 mg total) by mouth once daily.     Family History       Problem Relation (Age of Onset)    Heart disease Mother, Brother    Hyperlipidemia Sister, Sister, Sister, Sister    Hypertension Mother, Sister, Sister, Sister, Sister, Sister, Brother,  Brother    Kidney failure Sister    Lung cancer Father    No Known Problems Sister, Daughter, Daughter, Maternal Grandmother, Maternal Grandfather, Paternal Grandmother, Paternal Grandfather    Pancreatic cancer Sister          Tobacco Use    Smoking status: Every Day     Current packs/day: 1.00     Average packs/day: 1 pack/day for 44.0 years (44.0 ttl pk-yrs)     Types: Cigarettes     Passive exposure: Current    Smokeless tobacco: Never   Substance and Sexual Activity    Alcohol use: Yes     Comment: approx 12 pk per week & fifth of vodka per week    Drug use: Yes     Types: Marijuana    Sexual activity: Yes     Birth control/protection: Condom     Review of Systems   Constitutional:  Positive for fatigue.   Respiratory:  Positive for cough.    Neurological:  Positive for weakness.     Objective:     Vital Signs (Most Recent):  Temp: 98.3 °F (36.8 °C) (02/03/24 1510)  Pulse: 110 (02/03/24 1632)  Resp: 17 (02/03/24 1632)  BP: 127/88 (02/03/24 1510)  SpO2: 96 % (02/03/24 1632) Vital Signs (24h Range):  Temp:  [97.4 °F (36.3 °C)-98.3 °F (36.8 °C)] 98.3 °F (36.8 °C)  Pulse:  [110-116] 110  Resp:  [17-20] 17  SpO2:  [95 %-96 %] 96 %  BP: (116-127)/(76-88) 127/88     Weight: 59 kg (130 lb)  Body mass index is 18.13 kg/m².     Physical Exam  Vitals and nursing note reviewed.   Constitutional:       Comments: Frail appearing     HENT:      Head: Normocephalic and atraumatic.      Nose: Nose normal.      Mouth/Throat:      Mouth: Mucous membranes are moist.   Eyes:      Extraocular Movements: Extraocular movements intact.   Cardiovascular:      Rate and Rhythm: Normal rate and regular rhythm.      Pulses: Normal pulses.      Heart sounds: Normal heart sounds.   Pulmonary:      Effort: Pulmonary effort is normal.      Breath sounds: Examination of the right-upper field reveals decreased breath sounds. Examination of the right-middle field reveals decreased breath sounds. Examination of the right-lower field reveals  decreased breath sounds. Decreased breath sounds present.   Abdominal:      General: Bowel sounds are normal.      Palpations: Abdomen is soft.   Musculoskeletal:         General: Normal range of motion.      Cervical back: Normal range of motion.   Skin:     General: Skin is warm.   Neurological:      General: No focal deficit present.      Mental Status: He is alert and oriented to person, place, and time.      Motor: Weakness present.   Psychiatric:         Mood and Affect: Mood normal.         Behavior: Behavior normal.                Significant Labs: All pertinent labs within the past 24 hours have been reviewed.    Significant Imaging: I have reviewed all pertinent imaging results/findings within the past 24 hours.

## 2024-02-03 NOTE — ED TRIAGE NOTES
Pt presents to ed per EMS c/o weakness. Pt states was on couch and slid down to floor last night at approx. 1900. Pt c/o neck and back pain as well.

## 2024-02-03 NOTE — ASSESSMENT & PLAN NOTE
Presented with fall onto bottom, inability to ambulate x 2 days PTA.   Suspect overall debility due to malignancy, chemotherapy.   Known right sided weakness, no red flags on clinical exam.  Check CT T/L spine.   PT/OT consult, possibly to require swing bed placement.

## 2024-02-03 NOTE — ASSESSMENT & PLAN NOTE
Patient's anemia is currently controlled. Has not received any PRBCs to date. Etiology likely d/t chronic disease due to Malignancy and chemotherapy.  Baseline Hgb prior to being diagnosed with malignancy was ~ 12.   Admission Hgb 8.6.   Current CBC reviewed-   Lab Results   Component Value Date    HGB 8.6 (L) 02/03/2024    HCT 27.3 (L) 02/03/2024     Check iron panel, B12/folate, LDH/haptoglobin and stool for occult blood.  Underwent colonoscopy (screening) in 2022 but prep was poor and patient was supposed to repeat colonoscopy in 1 year but has not gotten a chance yet.  If stool occult blood positive and Hgb trends down, could consider GI consultation to get colonoscopy done as inpatient, otherwise schedule it as outpatient.   Monitor serial CBC and transfuse if patient becomes hemodynamically unstable, symptomatic or H/H drops below 7/21.

## 2024-02-03 NOTE — ASSESSMENT & PLAN NOTE
Dangers of cigarette smoking were reviewed with patient in detail. Patient was Referred to Tobacco Cessation Program. Nicotine replacement options were discussed. Nicotine replacement was discussed- prescribed

## 2024-02-03 NOTE — ASSESSMENT & PLAN NOTE
Creatine stable for now. BMP reviewed- noted Estimated Creatinine Clearance: 58.9 mL/min (based on SCr of 1.03 mg/dL). according to latest data. Based on current GFR, CKD stage is stage 3 - GFR 30-59.  Monitor UOP and serial BMP and adjust therapy as needed. Renally dose meds. Avoid nephrotoxic medications and procedures.

## 2024-02-03 NOTE — Clinical Note
Diagnosis: Pneumonia due to infectious organism, unspecified laterality, unspecified part of lung [[id number]]   Future Attending Provider: PRESTON PADILLA [[id number]]

## 2024-02-03 NOTE — ASSESSMENT & PLAN NOTE
Patient found to have large pleural effusion on imaging. I have personally reviewed and interpreted the following imaging: Xray. A thoracentesis was deferred. Most likely etiology includes Pneumonia and Metastatic lung cancer . Management to include  Ordered CT chest to better delineate the effusion/pathology and consult Pulmonology to perform thoracentesis.

## 2024-02-03 NOTE — ASSESSMENT & PLAN NOTE
"This patient does have evidence of infective focus  My overall impression is sepsis.  Source: Respiratory  Antibiotics given-   Antibiotics (72h ago, onward)      Start     Stop Route Frequency Ordered    02/04/24 1600  cefTRIAXone (ROCEPHIN) 2 g in dextrose 5 % in water (D5W) 100 mL IVPB (MB+)         02/10/24 1559 IV Every 24 hours (non-standard times) 02/03/24 1606    02/04/24 1600  azithromycin (ZITHROMAX) 500 mg in dextrose 5 % (D5W) 250 mL IVPB         -- IV Every 24 hours (non-standard times) 02/03/24 1606    02/03/24 1800  vancomycin (VANCOCIN) 1,250 mg in dextrose 5 % (D5W) 250 mL IVPB         -- IV Every 18 hours 02/03/24 1618    02/03/24 1717  vancomycin - pharmacy to dose         -- IV pharmacy to manage frequency 02/03/24 1618    02/03/24 1600  azithromycin (ZITHROMAX) 500 mg in dextrose 5 % (D5W) 250 mL IVPB         02/04/24 0359 IV ED 1 Time 02/03/24 1559          Latest lactate reviewed-  No results for input(s): "LACTATE", "POCLAC" in the last 72 hours.  Fluid challenge Actual Body weight- Patient will receive 30ml/kg actual body weight to calculate fluid bolus for treatment of septic shock.     Post- resuscitation assessment No - Post resuscitation assessment not needed       Will Not start Pressors- Levophed for MAP of 65  Oxygenation stable on room air.   Source control achieved by: antibiotics (vancomycin, ceftriaxone and azithromycin)- covering broader given recent chemo and underlying lung cancer possibly with post obstruction.   Check MRSA PCR, if negative will discontinue vancomycin.   "

## 2024-02-03 NOTE — ED PROVIDER NOTES
Encounter Date: 2/3/2024       History     Chief Complaint   Patient presents with    Weakness     68-year-old male presents to the emergency department to be evaluated after he fell earlier today and was not able to get up.  His sister found him lying in the floor and called EMS to have him transfer reported to the hospital for evaluation.  He reports that he tripped and fell a couple of hours ago.  He complains of a mild headache and neck pain.  He has a lung cancer and has been receiving radiation.    The history is provided by the patient.   Fall  The accident occurred 1 to 2 hours ago. Associated symptoms include neck pain. Pertinent negatives include no back pain, no paresthesias, no paralysis, no visual change, no fever, no numbness, no abdominal pain, no bowel incontinence, no nausea, no vomiting, no hematuria, no headaches, no hearing loss, no loss of consciousness and no tingling.     Review of patient's allergies indicates:   Allergen Reactions    Salsalate      Other reaction(s): Abdominal pain    Tramadol      Other reaction(s): Seizure     Past Medical History:   Diagnosis Date    Adenocarcinoma of lung     Dx 2/22/23    Chronic bilateral low back pain with right-sided sciatica     Chronic pain of right knee     Essential (primary) hypertension     Hypercholesteremia     Nicotine dependence, cigarettes, uncomplicated     Seizures 2020    last was approx 7 mths ago    Stroke 2015    with residual right sided weakness     Past Surgical History:   Procedure Laterality Date    KNEE ARTHROSCOPY Right 1995     Family History   Problem Relation Age of Onset    Hypertension Mother     Heart disease Mother         mild MI, later in life    Lung cancer Father     Pancreatic cancer Sister     Hyperlipidemia Sister     Hypertension Sister     Kidney failure Sister     Hypertension Sister     Hyperlipidemia Sister     Hypertension Sister     Hyperlipidemia Sister     Hypertension Sister     Hyperlipidemia Sister      Hypertension Sister     No Known Problems Sister     Heart disease Brother     Hypertension Brother     Hypertension Brother     No Known Problems Daughter     No Known Problems Daughter     No Known Problems Maternal Grandmother     No Known Problems Maternal Grandfather     No Known Problems Paternal Grandmother     No Known Problems Paternal Grandfather      Social History     Tobacco Use    Smoking status: Every Day     Current packs/day: 1.00     Average packs/day: 1 pack/day for 44.0 years (44.0 ttl pk-yrs)     Types: Cigarettes     Passive exposure: Current    Smokeless tobacco: Never   Substance Use Topics    Alcohol use: Yes     Comment: approx 12 pk per week & fifth of vodka per week    Drug use: Yes     Types: Marijuana     Review of Systems   Constitutional:  Negative for fever.   Gastrointestinal:  Negative for abdominal pain, bowel incontinence, nausea and vomiting.   Genitourinary:  Negative for hematuria.   Musculoskeletal:  Positive for neck pain. Negative for back pain.   Neurological:  Negative for tingling, loss of consciousness, numbness, headaches and paresthesias.   All other systems reviewed and are negative.      Physical Exam     Initial Vitals [02/03/24 1314]   BP Pulse Resp Temp SpO2   116/76 (!) 116 19 97.4 °F (36.3 °C) 95 %      MAP       --         Physical Exam    Vitals reviewed.  Constitutional: He appears well-developed and well-nourished.   HENT:   Head: Normocephalic and atraumatic.   Eyes: EOM are normal. Pupils are equal, round, and reactive to light.   Neck: Neck supple.   Cardiovascular:  Normal rate and regular rhythm.           Pulmonary/Chest: Breath sounds normal.   Abdominal: Abdomen is soft. Bowel sounds are normal. He exhibits no distension and no mass. There is no abdominal tenderness. There is no rebound and no guarding.   Musculoskeletal:         General: Normal range of motion.      Cervical back: Normal and neck supple.      Thoracic back: Normal.      Lumbar  back: Normal.     Neurological: He is alert and oriented to person, place, and time. He has normal strength. GCS score is 15. GCS eye subscore is 4. GCS verbal subscore is 5. GCS motor subscore is 6.   Skin: Skin is warm and dry. Capillary refill takes less than 2 seconds.   Psychiatric: He has a normal mood and affect.         Medical Screening Exam   See Full Note    ED Course   Procedures  Labs Reviewed   BASIC METABOLIC PANEL - Abnormal; Notable for the following components:       Result Value    Sodium 134 (*)     BUN 33 (*)     BUN/Creatinine Ratio 32 (*)     All other components within normal limits   URINALYSIS - Abnormal; Notable for the following components:    Protein, UA 30 (*)     Ketones, UA 10 (*)     All other components within normal limits   CBC WITH DIFFERENTIAL - Abnormal; Notable for the following components:    WBC 19.58 (*)     RBC 2.92 (*)     Hemoglobin 8.6 (*)     Hematocrit 27.3 (*)     MCHC 31.5 (*)     RDW 16.3 (*)     Platelet Count 483 (*)     Neutrophils % 93.8 (*)     Lymphocytes % 0.8 (*)     Eosinophils % 0.0 (*)     Immature Granulocytes % 0.9 (*)     Neutrophils, Abs 18.37 (*)     Lymphocytes, Absolute 0.15 (*)     Monocytes, Absolute 0.87 (*)     Immature Granulocytes, Absolute 0.17 (*)     All other components within normal limits   URINALYSIS, MICROSCOPIC - Abnormal; Notable for the following components:    Squamous Epithelial Cells, UA Occasional (*)     Hyaline Casts, UA 0-2 (*)     Mucous Occasional (*)     All other components within normal limits   CULTURE, BLOOD   CULTURE, BLOOD   RAPID INFLUENZA A/B   CBC W/ AUTO DIFFERENTIAL    Narrative:     The following orders were created for panel order CBC auto differential.  Procedure                               Abnormality         Status                     ---------                               -----------         ------                     CBC with Differential[[id number]]       Abnormal            Final result                  Please view results for these tests on the individual orders.   SARS-COV-2 RNA AMPLIFICATION, QUAL   LACTIC ACID, PLASMA   POCT OCCULT BLOOD (STOOL)          Imaging Results              X-Ray Chest 1 View (Final result)  Result time 02/03/24 15:38:20      Final result by Harsha Barcenas MD (02/03/24 15:38:20)                   Impression:      Prominent opacification right hemithorax which is thought to be related to a combination of right lung atelectasis and pleural effusion.    Potential 13 mm metastatic lung nodule left lung base      Electronically signed by: Harsha Barcenas  Date:    02/03/2024  Time:    15:38               Narrative:    EXAMINATION:  XR CHEST 1 VIEW    CLINICAL HISTORY:  .  Elevated white blood cell count, unspecified.  History of lung cancer    COMPARISON:  No previous similar    TECHNIQUE:  AP portable semi erect chest    FINDINGS:  There is prominent opacification of the right hemithorax with volume loss in this patient with reported known lung cancer.  This is thought to be related to a combination of right pleural effusion and atelectatic lung.    There is a 13 mm nodule superimposed over the left lung base which could represent metastatic disease.  There is no remote film for comparison.    Cardiac silhouette is not grossly enlarged.    There is no pulmonary vascular engorgement.    There is no definite acute osseous abnormality                                       CT Head Without Contrast (Final result)  Result time 02/03/24 15:27:18      Final result by Harsha Barcenas MD (02/03/24 15:27:18)                   Impression:      No acute intracranial process.  Chronic encephalomalacia left parietal lobe      Electronically signed by: Harsha Barcenas  Date:    02/03/2024  Time:    15:27               Narrative:    EXAMINATION:  CT head without contrast    CLINICAL HISTORY:  Head trauma, minor (Age >= 65y);    TECHNIQUE:  Transaxial CT sections were obtained through the  brain without contrast.    The CT examination was performed using one or more of the following dose reduction techniques: Automated exposure control, adjustment of the mA and kV according to patient's size, use of acute or iterative reconstruction techniques.    COMPARISON:  MRI brain October 25, 2022    FINDINGS:  The ventricles are midline in position without evidence of hydrocephalus.  There is no mass or parenchymal hemorrhage.  There is chronic encephalomalacia left parietal area.  There is a small amount of ill-defined low-density in the periventricular white matter without mass effect compatible with changes of small vessel disease.  There is no extra-axial hematoma.  There is no acute skull fracture.                                       CT Cervical Spine Without Contrast (Final result)  Result time 02/03/24 15:31:29      Final result by Harsha Barcenas MD (02/03/24 15:31:29)                   Impression:      No acute fracture    Lytic lesion within the C2 vertebral body consistent with metastatic disease.  If clinically appropriate, consider nuclear medicine bone scan for confirmation and to evaluate for additional metastatic lesions    Degenerative disc disease cervical spine      Electronically signed by: Harsha Barcenas  Date:    02/03/2024  Time:    15:31               Narrative:    EXAMINATION:  CT CERVICAL SPINE WITHOUT CONTRAST    CLINICAL HISTORY:  Neck trauma (Age >= 65y); fall earlier today.  History of lung cancer    TECHNIQUE:  Thin spiral CT sections were obtained through the cervical spine without contrast. Multiplanar reconstruction images are also evaluated.    The CT examination was performed using one or more of the following dose reduction techniques: Automated exposure control, adjustment of the mA and kV according to patient's size, use of acute or iterative reconstruction techniques.    COMPARISON:  No previous similar    FINDINGS:  There is no acute fracture or prevertebral  soft tissue swelling.  There is an ill-defined lytic area within the body and odontoid process of C2 measuring at least 19 mm diameter.  This is suspicious for metastatic disease.    There is straightening of the spine which may be positional or related to muscle spasm.  There is scattered moderate degenerative disc narrowing and cervical spondylosis.    There is scattered bulging disc and osteophyte with mild narrowing of the spinal canal at C2-C3 through C7-T1.                                       Medications   sodium chloride 0.9% bolus 1,000 mL 1,000 mL (1,000 mLs Intravenous New Bag 2/3/24 1515)   cefTRIAXone (Rocephin) 1 g in dextrose 5 % in water (D5W) 100 mL IVPB (MB+) (has no administration in time range)   sodium chloride 0.9% bolus 1,000 mL 1,000 mL (0 mLs Intravenous Stopped 2/3/24 1435)     Medical Decision Making  68-year-old male presents to the emergency department to be evaluated after he fell earlier today and was not able to get up.  His sister found him lying in the floor and called EMS to have him transfer reported to the hospital for evaluation.  He reports that he tripped and fell a couple of hours ago.  He complains of a mild headache and neck pain.  He has a lung cancer and has been receiving radiation.  Labs ordered and reviewed  CTs ordered and reviewed as well as radiologist's interpretation significant for no acute process  X-rays ordered, reviewed as well as radiologist's interpretation significant forProminent opacification right hemithorax which is thought to be related to a combination of right lung atelectasis and pleural effusion.Potential 13 mm metastatic lung nodule left lung base  Diagnosis: Dehydration, pneumonia  Discussed with Dr. Cruz, will admit  Treated with IV fluids and Rocephin in the emergency department      Amount and/or Complexity of Data Reviewed  Labs: ordered.  Radiology: ordered.                                      Clinical Impression:   Final  diagnoses:  [D72.829] Leukocytosis  [J18.9] Pneumonia due to infectious organism, unspecified laterality, unspecified part of lung (Primary)  [E86.0] Dehydration        ED Disposition Condition    Observation Stable                Va Angel, JODY  02/03/24 0326

## 2024-02-03 NOTE — ASSESSMENT & PLAN NOTE
In 11/2022 patient had CXR concerning for right hilar mass, CT chest confirmed the mass with lymph nodes suggestive of malignancy.   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   CT cervical spine consistent with C2 metastatic lesion.   S/p chemotherapy completion 3 weeks ago and currently receiving radiation therapy for spinal mets (4 out of 6 sessions completed).   Follows with Dr. Vazquez.

## 2024-02-03 NOTE — HPI
"Mr. Campo is a 68 Y O male with PMH of Lung cancer (stage 4, with mets to spine) s/p chemo, currently undergoing radiation, HTN, history of CVA with residual right hemiplegia, BPH, tobacco dependence, who presented to ED via EMS with fall and generalized weakness. Patient reports since last 2 days he has gotten progressively weak where his legs will "give out" whenever he tries to walk. He slid down to the floor from the couch last night, unable to get up. Sister called EMS. Patient reports productive cough with brownish sputum and some exertional shortness of breath. No reports of fever, chills, ill contacts. Patient denies chest pain, leg swelling, numbness in lower extremities, bowel or bladder incontinence. He denies head trauma, seizure like activity or LOC. He has generally gotten weaker since his diagnosis of lung cancer exactly a year ago and especially since completing chemotherapy 3 weeks prior to admission. He was started on radiation therapy for his mets to spine, and he has undergone 4 out of 6 sessions. He follows with Dr. Vazquez for his lung cancer.   "

## 2024-02-03 NOTE — H&P
" [facility] - Emergency Department  [facility] Medicine  History & Physical    Patient Name: Steven Campo  MRN: [id number]  Patient Class: OP- Observation  Admission Date: 2/3/2024  Attending Physician: Rafael Cruz MD   Primary Care Provider: Dawn Dumont FNP         Patient information was obtained from patient, past medical records, and ER records.     Subjective:     Principal Problem:Generalized weakness    Chief Complaint:   Chief Complaint   Patient presents with    Weakness        HPI: Mr. Campo is a 68 Y O male with PMH of Lung cancer (stage 4, with mets to spine) s/p chemo, currently undergoing radiation, HTN, history of CVA with residual right hemiplegia, BPH, tobacco dependence, who presented to ED via EMS with fall and generalized weakness. Patient reports since last 2 days he has gotten progressively weak where his legs will "give out" whenever he tries to walk. He slid down to the floor from the couch last night, unable to get up. Sister called EMS. Patient reports productive cough with brownish sputum and some exertional shortness of breath. No reports of fever, chills, ill contacts. Patient denies chest pain, leg swelling, numbness in lower extremities, bowel or bladder incontinence. He denies head trauma, seizure like activity or LOC. He has generally gotten weaker since his diagnosis of lung cancer exactly a year ago and especially since completing chemotherapy 3 weeks prior to admission. He was started on radiation therapy for his mets to spine, and he has undergone 4 out of 6 sessions. He follows with Dr. Vazquez for his lung cancer.     Past Medical History:   Diagnosis Date    Adenocarcinoma of lung     Dx 2/22/23    Chronic bilateral low back pain with right-sided sciatica     Chronic pain of right knee     Essential (primary) hypertension     Hypercholesteremia     Nicotine dependence, cigarettes, uncomplicated     Seizures 2020    last was approx 7 mths ago    Stroke " 2015    with residual right sided weakness       Past Surgical History:   Procedure Laterality Date    KNEE ARTHROSCOPY Right 1995       Review of patient's allergies indicates:   Allergen Reactions    Salsalate      Other reaction(s): Abdominal pain    Tramadol      Other reaction(s): Seizure       No current facility-administered medications on file prior to encounter.     Current Outpatient Medications on File Prior to Encounter   Medication Sig    albuterol (VENTOLIN HFA) 90 mcg/actuation inhaler Inhale 2 puffs into the lungs every 4 (four) hours as needed for Wheezing or Shortness of Breath. Rescue    albuterol-ipratropium (DUO-NEB) 2.5 mg-0.5 mg/3 mL nebulizer solution Take 3 mLs by nebulization every 6 (six) hours as needed for Wheezing or Shortness of Breath. Rescue    aspirin 81 mg Cap Take 81 mg by mouth once daily.    atorvastatin (LIPITOR) 40 MG tablet Take 1 tablet (40 mg total) by mouth once daily.    fentaNYL (DURAGESIC) 12 mcg/hr PT72 Place 1 patch onto the skin every 72 hours.    finasteride (PROSCAR) 5 mg tablet Take 5 mg by mouth once daily.    HYDROcodone-acetaminophen (NORCO)  mg per tablet Take 1 tablet by mouth every 6 (six) hours as needed for Pain.    mometasone (ASMANEX HFA) 200 mcg/actuation HFAA Inhale 2 puffs into the lungs 2 (two) times a day. Controller    amLODIPine (NORVASC) 5 MG tablet Take 1 tablet (5 mg total) by mouth once daily.     Family History       Problem Relation (Age of Onset)    Heart disease Mother, Brother    Hyperlipidemia Sister, Sister, Sister, Sister    Hypertension Mother, Sister, Sister, Sister, Sister, Sister, Brother, Brother    Kidney failure Sister    Lung cancer Father    No Known Problems Sister, Daughter, Daughter, Maternal Grandmother, Maternal Grandfather, Paternal Grandmother, Paternal Grandfather    Pancreatic cancer Sister          Tobacco Use    Smoking status: Every Day     Current packs/day: 1.00     Average packs/day: 1 pack/day for 44.0  years (44.0 ttl pk-yrs)     Types: Cigarettes     Passive exposure: Current    Smokeless tobacco: Never   Substance and Sexual Activity    Alcohol use: Yes     Comment: approx 12 pk per week & fifth of vodka per week    Drug use: Yes     Types: Marijuana    Sexual activity: Yes     Birth control/protection: Condom     Review of Systems   Constitutional:  Positive for fatigue.   Respiratory:  Positive for cough.    Neurological:  Positive for weakness.     Objective:     Vital Signs (Most Recent):  Temp: 98.3 °F (36.8 °C) (02/03/24 1510)  Pulse: 110 (02/03/24 1632)  Resp: 17 (02/03/24 1632)  BP: 127/88 (02/03/24 1510)  SpO2: 96 % (02/03/24 1632) Vital Signs (24h Range):  Temp:  [97.4 °F (36.3 °C)-98.3 °F (36.8 °C)] 98.3 °F (36.8 °C)  Pulse:  [110-116] 110  Resp:  [17-20] 17  SpO2:  [95 %-96 %] 96 %  BP: (116-127)/(76-88) 127/88     Weight: 59 kg (130 lb)  Body mass index is 18.13 kg/m².     Physical Exam  Vitals and nursing note reviewed.   Constitutional:       Comments: Frail appearing     HENT:      Head: Normocephalic and atraumatic.      Nose: Nose normal.      Mouth/Throat:      Mouth: Mucous membranes are moist.   Eyes:      Extraocular Movements: Extraocular movements intact.   Cardiovascular:      Rate and Rhythm: Normal rate and regular rhythm.      Pulses: Normal pulses.      Heart sounds: Normal heart sounds.   Pulmonary:      Effort: Pulmonary effort is normal.      Breath sounds: Examination of the right-upper field reveals decreased breath sounds. Examination of the right-middle field reveals decreased breath sounds. Examination of the right-lower field reveals decreased breath sounds. Decreased breath sounds present.   Abdominal:      General: Bowel sounds are normal.      Palpations: Abdomen is soft.   Musculoskeletal:         General: Normal range of motion.      Cervical back: Normal range of motion.   Skin:     General: Skin is warm.   Neurological:      General: No focal deficit present.       "Mental Status: He is alert and oriented to person, place, and time.      Motor: Weakness present.   Psychiatric:         Mood and Affect: Mood normal.         Behavior: Behavior normal.                Significant Labs: All pertinent labs within the past 24 hours have been reviewed.    Significant Imaging: I have reviewed all pertinent imaging results/findings within the past 24 hours.  Assessment/Plan:     * Generalized weakness  Presented with fall onto bottom, inability to ambulate x 2 days PTA.   Suspect overall debility due to malignancy, chemotherapy.   Known right sided weakness, no red flags on clinical exam.  Check CT T/L spine.   PT/OT consult, possibly to require swing bed placement.       Sepsis due to pneumonia  This patient does have evidence of infective focus  My overall impression is sepsis.  Source: Respiratory  Antibiotics given-   Antibiotics (72h ago, onward)      Start     Stop Route Frequency Ordered    02/04/24 1600  cefTRIAXone (ROCEPHIN) 2 g in dextrose 5 % in water (D5W) 100 mL IVPB (MB+)         02/10/24 1559 IV Every 24 hours (non-standard times) 02/03/24 1606    02/04/24 1600  azithromycin (ZITHROMAX) 500 mg in dextrose 5 % (D5W) 250 mL IVPB         -- IV Every 24 hours (non-standard times) 02/03/24 1606    02/03/24 1800  vancomycin (VANCOCIN) 1,250 mg in dextrose 5 % (D5W) 250 mL IVPB         -- IV Every 18 hours 02/03/24 1618    02/03/24 1717  vancomycin - pharmacy to dose         -- IV pharmacy to manage frequency 02/03/24 1618    02/03/24 1600  azithromycin (ZITHROMAX) 500 mg in dextrose 5 % (D5W) 250 mL IVPB         02/04/24 0359 IV ED 1 Time 02/03/24 1559          Latest lactate reviewed-  No results for input(s): "LACTATE", "POCLAC" in the last 72 hours.  Fluid challenge Actual Body weight- Patient will receive 30ml/kg actual body weight to calculate fluid bolus for treatment of septic shock.     Post- resuscitation assessment No - Post resuscitation assessment not needed "       Will Not start Pressors- Levophed for MAP of 65  Oxygenation stable on room air.   Source control achieved by: antibiotics (vancomycin, ceftriaxone and azithromycin)- covering broader given recent chemo and underlying lung cancer possibly with post obstruction.   Check MRSA PCR, if negative will discontinue vancomycin.     Pleural effusion  Patient found to have large pleural effusion on imaging. I have personally reviewed and interpreted the following imaging: Xray. A thoracentesis was deferred. Most likely etiology includes Pneumonia and Metastatic lung cancer . Management to include  Ordered CT chest to better delineate the effusion/pathology and consult Pulmonology to perform thoracentesis.       Tobacco dependence  Dangers of cigarette smoking were reviewed with patient in detail. Patient was Referred to Tobacco Cessation Program. Nicotine replacement options were discussed. Nicotine replacement was discussed- prescribed    DNR (do not resuscitate)  Discussed code status on admission, in the presence of ED RN.   Confirms DNR and do not intubate.       Normocytic anemia  Patient's anemia is currently controlled. Has not received any PRBCs to date. Etiology likely d/t chronic disease due to Malignancy and chemotherapy.  Baseline Hgb prior to being diagnosed with malignancy was ~ 12.   Admission Hgb 8.6.   Current CBC reviewed-   Lab Results   Component Value Date    HGB 8.6 (L) 02/03/2024    HCT 27.3 (L) 02/03/2024     Check iron panel, B12/folate, LDH/haptoglobin and stool for occult blood.  Underwent colonoscopy (screening) in 2022 but prep was poor and patient was supposed to repeat colonoscopy in 1 year but has not gotten a chance yet.  If stool occult blood positive and Hgb trends down, could consider GI consultation to get colonoscopy done as inpatient, otherwise schedule it as outpatient.   Monitor serial CBC and transfuse if patient becomes hemodynamically unstable, symptomatic or H/H drops below  7/21.    Benign prostatic hyperplasia with urinary frequency  Resume home finasteride.       Malignant neoplasm of right lung  In 11/2022 patient had CXR concerning for right hilar mass, CT chest confirmed the mass with lymph nodes suggestive of malignancy.   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   CT cervical spine consistent with C2 metastatic lesion.   S/p chemotherapy completion 3 weeks ago and currently receiving radiation therapy for spinal mets (4 out of 6 sessions completed).   Follows with Dr. Vazquez.     Stage 3b chronic kidney disease  Creatine stable for now. BMP reviewed- noted Estimated Creatinine Clearance: 58.9 mL/min (based on SCr of 1.03 mg/dL). according to latest data. Based on current GFR, CKD stage is stage 3 - GFR 30-59.  Monitor UOP and serial BMP and adjust therapy as needed. Renally dose meds. Avoid nephrotoxic medications and procedures.    Essential (primary) hypertension  Chronic, controlled. Latest blood pressure and vitals reviewed-     Temp:  [97.4 °F (36.3 °C)-98.3 °F (36.8 °C)]   Pulse:  [110-116]   Resp:  [17-20]   BP: (116-127)/(76-88)   SpO2:  [95 %-96 %] .   Home meds for hypertension were reviewed and noted below.   Hypertension Medications               amLODIPine (NORVASC) 5 MG tablet Take 1 tablet (5 mg total) by mouth once daily.            While in the hospital, will manage blood pressure as follows; Continue home antihypertensive regimen    Will utilize p.r.n. blood pressure medication only if patient's blood pressure greater than 180/110 and he develops symptoms such as worsening chest pain or shortness of breath.    History of stroke  Residual right sided weakness.   Follows with Dr. Sanchez.   Resume home aspirin and statin.         VTE Risk Mitigation (From admission, onward)           Ordered     enoxaparin injection 40 mg  Daily         02/03/24 1606     IP VTE HIGH RISK PATIENT   Once         02/03/24 1606     Place sequential compression device  Until discontinued         02/03/24 1606                       On 02/03/2024, patient should be placed in hospital observation services under my care.        Pharmacy consulted to assist with the management of vancomycin in this patient to treat: possible pneumonia    Patient weight: 59 kg  Patient CrCl: ~ 72 ml/min calculated    Will begin vancomycin: 1250mg every 18 hours    Vancomycin level ordered.     Pharmacy will continue to monitor and make adjustments as needed.      Thank you for the consult,    Radha Espinosa, PharmD  [phone]    PRESTON PADILLA MD  Department of Hospital Medicine  [facility] - Emergency Department

## 2024-02-03 NOTE — ASSESSMENT & PLAN NOTE
Chronic, controlled. Latest blood pressure and vitals reviewed-     Temp:  [97.4 °F (36.3 °C)-98.3 °F (36.8 °C)]   Pulse:  [110-116]   Resp:  [17-20]   BP: (116-127)/(76-88)   SpO2:  [95 %-96 %] .   Home meds for hypertension were reviewed and noted below.   Hypertension Medications               amLODIPine (NORVASC) 5 MG tablet Take 1 tablet (5 mg total) by mouth once daily.            While in the hospital, will manage blood pressure as follows; Continue home antihypertensive regimen    Will utilize p.r.n. blood pressure medication only if patient's blood pressure greater than 180/110 and he develops symptoms such as worsening chest pain or shortness of breath.

## 2024-02-04 PROBLEM — S32.010A CLOSED COMPRESSION FRACTURE OF L1 VERTEBRA: Status: ACTIVE | Noted: 2024-01-01

## 2024-02-04 NOTE — ASSESSMENT & PLAN NOTE
Patient was diagnosed with adenocarcinoma of the lung a year ago and took chemotherapy till 3 weeks ago he is just about to finish a course of radiation it looks like on film he has stage IV disease with bone Mets but also has what looks like an obstruction in his distal right mainstem bronchi he also has pleural effusion and has atelectasis with shift towards the side of the lesion.  Definitive therapy for obstruction of an airways radiation does not seem to improve I think prior to doing a thoracentesis he needs to have his airway evaluated to see the level of obstruction and then consideration for thoracentesis versus PleurX catheter any treatment at this time is going to be palliative only. will take over his care for pulmonary tomorrow

## 2024-02-04 NOTE — ASSESSMENT & PLAN NOTE
CT L-spine showed acute pathologic L1 compression fracture with mild retropulsion, no spinal stenosis reported.  Checking MRI lumbar spine to ensure no cord involvement, clinically patient does not exhibit signs of cord compression.  May require consultation with ortho-spine team.

## 2024-02-04 NOTE — ASSESSMENT & PLAN NOTE
Chronic, controlled. Latest blood pressure and vitals reviewed-     Temp:  [97.4 °F (36.3 °C)-98.5 °F (36.9 °C)]   Pulse:  []   Resp:  [14-20]   BP: (102-141)/(61-88)   SpO2:  [94 %-100 %] .   Home meds for hypertension were reviewed and noted below.   Hypertension Medications               amLODIPine (NORVASC) 5 MG tablet Take 1 tablet (5 mg total) by mouth once daily.            While in the hospital, will manage blood pressure as follows; Continue home antihypertensive regimen    Will utilize p.r.n. blood pressure medication only if patient's blood pressure greater than 180/110 and he develops symptoms such as worsening chest pain or shortness of breath.

## 2024-02-04 NOTE — HPI
68-year-old black male presents with lung cancer stage IV with Mets to the spine status post chemo therapy undergoing radiation.  Patient has gotten progressively weak his leg that whenever he tries to walk patient denies any bowel or bladder incontinence or numbness in his extremities he finished chemotherapy 3 weeks ago has 1 more session of radiation to go

## 2024-02-04 NOTE — ASSESSMENT & PLAN NOTE
Patient's anemia is currently controlled. Has not received any PRBCs to date. Etiology likely d/t chronic disease due to Malignancy and chemotherapy.  Baseline Hgb prior to being diagnosed with malignancy was ~ 12.   Admission Hgb 8.6.   Current CBC reviewed-   Lab Results   Component Value Date    HGB 7.5 (L) 02/04/2024    HCT 24.4 (L) 02/04/2024     B12/folate WNL, LDH/haptoglobin NOT suggestive of hemolysis, iron panel consistent with anemia of chronic disease.   Stool for occult blood is positive, no active bleeding noted.  Underwent colonoscopy (screening) in 2022 but prep was poor and patient was supposed to repeat colonoscopy in 1 year but has not gotten a chance yet.  If develops active GI bleeding or Hgb significantly trend down then we will consult GI for endoscopic evaluation.   Monitor serial CBC and transfuse if patient becomes hemodynamically unstable, symptomatic or H/H drops below 7/21.

## 2024-02-04 NOTE — ASSESSMENT & PLAN NOTE
Patient found to have large pleural effusion on imaging. I have personally reviewed and interpreted the following imaging: Xray. A thoracentesis was deferred. Most likely etiology includes Pneumonia and Metastatic lung cancer .  CT chest done and confirms effusion.  Discussed with Pulm, hold on thoracentesis since there is right mainstem bronchus obstruction from the tumor and the lung is not likely to expand after thoracentesis, and patient is stable from respiratory stand point.  Will discuss with interventional Pulm for further options.

## 2024-02-04 NOTE — ED NOTES
Pt attempted to use urinal and urinated on self, changed bedpad and linens, hygiene care provided, picture uploaded to chart of wound, foam boarder applied to wound on buttocks and repositioned pt in bed. While changing patient's brief, noticed a 12mcg fentanyl patch to left hip and another 12mcg fentanyl patch to right hip. no dates were written on them. Pt is only order a total of 12mcg fentanyl patch q72 hours on home med list. pt states he last had one placed 3-4 days ago and guesses they forgot to take the old one off. He is c/o pain at this time. removed both patches from patient, wasted in appropriate black box with Olga De La Torre RN. MD made aware. Per MD, okay to just give ordered PRN pain med at this time.

## 2024-02-04 NOTE — ED NOTES
Spoke with MARINA Garza regarding pulmonology consult for pt's pleural effusion, states no thoracentesis on the weekends so pt can eat.

## 2024-02-04 NOTE — SUBJECTIVE & OBJECTIVE
Interval History: Patient seen and examined at the bedside, reports feeling ok. Continues to have weakness in his legs.     Review of Systems   Constitutional:  Positive for fatigue.   Respiratory:  Positive for cough.    Neurological:  Positive for weakness.     Objective:     Vital Signs (Most Recent):  Temp: 98.5 °F (36.9 °C) (02/04/24 0348)  Pulse: 104 (02/04/24 0945)  Resp: 20 (02/04/24 0811)  BP: 119/61 (02/04/24 0603)  SpO2: 96 % (02/04/24 0945) Vital Signs (24h Range):  Temp:  [97.4 °F (36.3 °C)-98.5 °F (36.9 °C)] 98.5 °F (36.9 °C)  Pulse:  [] 104  Resp:  [14-20] 20  SpO2:  [94 %-100 %] 96 %  BP: (102-141)/(61-88) 119/61     Weight: 59 kg (130 lb)  Body mass index is 18.13 kg/m².  No intake or output data in the 24 hours ending 02/04/24 1201      Physical Exam  Vitals and nursing note reviewed.   Constitutional:       Comments: Frail appearing     HENT:      Head: Normocephalic and atraumatic.      Nose: Nose normal.      Mouth/Throat:      Mouth: Mucous membranes are moist.   Eyes:      Extraocular Movements: Extraocular movements intact.   Cardiovascular:      Rate and Rhythm: Regular rhythm. Tachycardia present.      Pulses: Normal pulses.      Heart sounds: Normal heart sounds.   Pulmonary:      Effort: Pulmonary effort is normal.      Breath sounds: Examination of the right-upper field reveals decreased breath sounds. Examination of the right-middle field reveals decreased breath sounds. Examination of the right-lower field reveals decreased breath sounds. Decreased breath sounds present.   Abdominal:      General: Bowel sounds are normal.      Palpations: Abdomen is soft.   Musculoskeletal:         General: Normal range of motion.      Cervical back: Normal range of motion.   Skin:     General: Skin is warm.   Neurological:      General: No focal deficit present.      Mental Status: He is alert and oriented to person, place, and time.      Motor: Weakness present.   Psychiatric:         Mood and  Affect: Mood normal.         Behavior: Behavior normal.             Significant Labs: All pertinent labs within the past 24 hours have been reviewed.    Significant Imaging: I have reviewed all pertinent imaging results/findings within the past 24 hours.

## 2024-02-04 NOTE — HOSPITAL COURSE
2/4: Pulmonology evaluated, recommend no thoracentesis until right bronchus obstruction is relieved as the lung is not likely to expand. Will run it by interventional Pulm for options.   2/5: D/W Dr. Robert (interventional Pulm), plan for PleurX catheter and bronch with BAL on 2/7. GI consulted given drop in Hgb- they recommend to monitor Hgb without endoscopic evaluation for now.   2/7: Hgb with slight drop but overall stable. S/p bronch, interventional Pulm decided not to insert PleurX given large endobronchial mass and would not gain benefit from it. BAL sent. SNF placement in progress. Ortho-spine consulted given compression fracture and abnormal MRI L-spine, recommend no surgical intervention, TLSO brace recommended.   2/8: Hgb overall stable without active/overt bleeding. Discharging to SNF today.

## 2024-02-04 NOTE — ASSESSMENT & PLAN NOTE
Creatine stable for now. BMP reviewed- noted Estimated Creatinine Clearance: 61.3 mL/min (based on SCr of 0.99 mg/dL). according to latest data. Based on current GFR, CKD stage is stage 3 - GFR 30-59.  Monitor UOP and serial BMP and adjust therapy as needed. Renally dose meds. Avoid nephrotoxic medications and procedures.

## 2024-02-04 NOTE — ASSESSMENT & PLAN NOTE
Presented with fall onto bottom, inability to ambulate x 2 days PTA.   Suspect overall debility due to malignancy, chemotherapy.   Known right sided weakness, no red flags on clinical exam.  CT T/L spine with metastatic disease and compression fracture respectively.   PT/OT consulted, possibly to require swing bed placement.      No

## 2024-02-04 NOTE — PROGRESS NOTES
[facility] - Emergency Department  Pulmonology  Progress Note    Patient Name: Steven Campo  MRN: [id number]  Admission Date: 2/3/2024  Hospital Length of Stay: 1 days  Code Status: DNR  Attending Provider: Rafael Cruz MD  Primary Care Provider: Dawn Dumont FNP   Principal Problem: Generalized weakness    Subjective:     Past Medical History:   Diagnosis Date    Adenocarcinoma of lung     Dx 2/22/23    Chronic bilateral low back pain with right-sided sciatica     Chronic pain of right knee     Essential (primary) hypertension     Hypercholesteremia     Nicotine dependence, cigarettes, uncomplicated     Seizures 2020    last was approx 7 mths ago    Stroke 2015    with residual right sided weakness       Past Surgical History:   Procedure Laterality Date    KNEE ARTHROSCOPY Right 1995       Review of patient's allergies indicates:   Allergen Reactions    Salsalate      Other reaction(s): Abdominal pain    Tramadol      Other reaction(s): Seizure       Family History       Problem Relation (Age of Onset)    Heart disease Mother, Brother    Hyperlipidemia Sister, Sister, Sister, Sister    Hypertension Mother, Sister, Sister, Sister, Sister, Sister, Brother, Brother    Kidney failure Sister    Lung cancer Father    No Known Problems Sister, Daughter, Daughter, Maternal Grandmother, Maternal Grandfather, Paternal Grandmother, Paternal Grandfather    Pancreatic cancer Sister          Tobacco Use    Smoking status: Every Day     Current packs/day: 1.00     Average packs/day: 1 pack/day for 44.0 years (44.0 ttl pk-yrs)     Types: Cigarettes     Passive exposure: Current    Smokeless tobacco: Never   Substance and Sexual Activity    Alcohol use: Yes     Comment: approx 12 pk per week & fifth of vodka per week    Drug use: Yes     Types: Marijuana    Sexual activity: Yes     Birth control/protection: Condom         Review of Systems  Objective:     Vital Signs (Most Recent):  Temp: 98.5 °F (36.9 °C)  (02/04/24 0348)  Pulse: 104 (02/04/24 0945)  Resp: 20 (02/04/24 0811)  BP: 119/61 (02/04/24 0603)  SpO2: 96 % (02/04/24 0945) Vital Signs (24h Range):  Temp:  [97.4 °F (36.3 °C)-98.5 °F (36.9 °C)] 98.5 °F (36.9 °C)  Pulse:  [] 104  Resp:  [14-20] 20  SpO2:  [94 %-100 %] 96 %  BP: (102-141)/(61-88) 119/61     Weight: 59 kg (130 lb)  Body mass index is 18.13 kg/m².    No intake or output data in the 24 hours ending 02/04/24 1007     Physical Exam  Vitals reviewed.   Constitutional:       Appearance: Normal appearance.      Interventions: He is not intubated.  HENT:      Head: Normocephalic and atraumatic.      Nose: Nose normal.      Mouth/Throat:      Mouth: Mucous membranes are dry.      Pharynx: Oropharynx is clear.   Eyes:      Extraocular Movements: Extraocular movements intact.      Conjunctiva/sclera: Conjunctivae normal.      Pupils: Pupils are equal, round, and reactive to light.   Cardiovascular:      Rate and Rhythm: Normal rate.      Heart sounds: Normal heart sounds. No murmur heard.  Pulmonary:      Effort: Pulmonary effort is normal. He is not intubated.      Breath sounds: Normal breath sounds.   Abdominal:      General: Abdomen is flat. Bowel sounds are normal.      Palpations: Abdomen is soft.   Musculoskeletal:         General: Normal range of motion.      Cervical back: Normal range of motion and neck supple.      Right lower leg: No edema.      Left lower leg: No edema.   Skin:     General: Skin is warm and dry.      Capillary Refill: Capillary refill takes less than 2 seconds.   Neurological:      General: No focal deficit present.      Mental Status: He is alert and oriented to person, place, and time.   Psychiatric:         Mood and Affect: Mood normal.         Behavior: Behavior normal.          Vents:       Lines/Drains/Airways       Peripheral Intravenous Line  Duration                  Peripheral IV - Single Lumen 02/03/24 1259 18 G Anterior;Distal;Left Upper Arm <1 day                     Significant Labs:    CBC/Anemia Profile:  Recent Labs   Lab 02/03/24  1330 02/04/24  0432   WBC 19.58* 16.03*   HGB 8.6* 7.5*   HCT 27.3* 24.4*   * 428*   MCV 93.5 97.2*   RDW 16.3* 16.5*   IRON 19*  --    FOLATE 18.9*  --    [id number] 500  --         Chemistries:  Recent Labs   Lab 02/03/24  1330 02/04/24  0432   * 136   K 4.2 3.8    105   CO2 25 24   BUN 33* 26*   CREATININE 1.03 0.99   CALCIUM 9.2 8.7   ALBUMIN  --  1.6*   PROT  --  6.4   BILITOT  --  0.2   ALKPHOS  --  131*   ALT  --  10*   AST  --  43*       Recent Lab Results  (Last 5 results in the past 24 hours)        02/04/24  0432   02/03/24  2042   02/03/24  1940   02/03/24  1805   02/03/24  1601        Influenza B, Molecular         Negative       Albumin/Globulin Ratio 0.3               Albumin 1.6                              ALT 10               Anion Gap 11               AST 43               Baso # 0.02               Basophil % 0.1               BILIRUBIN TOTAL 0.2               BUN 26               BUN/CREAT RATIO 26               Calcium 8.7               Chloride 105               CO2 24               ID NOW COVID-19, (SHANNAN)         Negative       Creatinine 0.99               Differential Method Auto               eGFR 84               Eos # 0.00               Eos % 0.0               Fecal Occult Blood     Positive           Globulin, Total 4.8               Glucose 119               Hematocrit 24.4               Hemoglobin 7.5               Immature Grans (Abs) 0.11               Immature Granulocytes 0.7               Influenza A         Negative       Lactic Acid Level   2.0     2.8  Comment: A repeat order for Lactic Acid has been placed for collection in 2 hours.   2.3  Comment: A repeat order for Lactic Acid has been placed for collection in 2 hours.       Lymph # 0.23               Lymph % 1.4               MCH 29.9               MCHC 30.7               MCV 97.2               Mono # 0.97               Mono  % 6.1               MPV 10.0               Neutrophils, Abs 14.70               Neutrophils Relative 91.7               nRBC 0.0               NUCLEATED RBC ABSOLUTE 0.00               Platelet Count 428               Potassium 3.8               PROTEIN TOTAL 6.4               RBC 2.51               RDW 16.5               Sodium 136               WBC 16.03                                      Significant Imaging:   I have reviewed all pertinent imaging results/findings within the past 24 hours.  Assessment/Plan:     Pulmonary  Mass of right lung  Patient was diagnosed with adenocarcinoma of the lung a year ago and took chemotherapy till 3 weeks ago he is just about to finish a course of radiation it looks like on film he has stage IV disease with bone Mets but also has what looks like an obstruction in his distal right mainstem bronchi he also has pleural effusion and has atelectasis with shift towards the side of the lesion.  Definitive therapy for obstruction of an airways radiation does not seem to improve I think prior to doing a thoracentesis he needs to have his airway evaluated to see the level of obstruction and then consideration for thoracentesis versus PleurX catheter any treatment at this time is going to be palliative only. will take over his care for pulmonary tomorrow                 Benito Nugent MD  Pulmonology  [facility] - Emergency Department

## 2024-02-04 NOTE — ASSESSMENT & PLAN NOTE
This patient does have evidence of infective focus  My overall impression is sepsis.  Source: Respiratory  Antibiotics given-   Antibiotics (72h ago, onward)    Start     Stop Route Frequency Ordered    02/04/24 1600  cefTRIAXone (ROCEPHIN) 2 g in dextrose 5 % in water (D5W) 100 mL IVPB (MB+)         02/10/24 1559 IV Every 24 hours (non-standard times) 02/03/24 1606    02/04/24 1600  azithromycin (ZITHROMAX) 500 mg in dextrose 5 % (D5W) 250 mL IVPB         -- IV Every 24 hours (non-standard times) 02/03/24 1606    02/03/24 1800  vancomycin (VANCOCIN) 1,250 mg in dextrose 5 % (D5W) 250 mL IVPB         -- IV Every 18 hours 02/03/24 1618    02/03/24 1717  vancomycin - pharmacy to dose         -- IV pharmacy to manage frequency 02/03/24 1618        Latest lactate reviewed-  Recent Labs   Lab 02/03/24  2042   LACTATE 2.0     Fluid challenge Actual Body weight- Patient will receive 30ml/kg actual body weight to calculate fluid bolus for treatment of septic shock.     Post- resuscitation assessment No - Post resuscitation assessment not needed       Will Not start Pressors- Levophed for MAP of 65  Oxygenation stable on room air.   Source control achieved by: antibiotics (vancomycin, ceftriaxone and azithromycin)- covering broader given recent chemo and underlying lung cancer possibly with post obstruction.   Check MRSA PCR, if negative will discontinue vancomycin.

## 2024-02-04 NOTE — PT/OT/SLP PROGRESS
"Physical Therapy      Patient Name:  Steven Campo   MRN:  [id number]    Patient not seen today secondary to Patient fatigue ("i dont feel like it right now. Check back tomorrow"). Will follow-up 2/5/24.    "

## 2024-02-04 NOTE — PROGRESS NOTES
"[facility] - Emergency Department  [facility] Medicine  Progress Note    Patient Name: Steven Campo  MRN: [id number]  Patient Class: IP- Inpatient   Admission Date: 2/3/2024  Length of Stay: 1 days  Attending Physician: Rafael Cruz MD  Primary Care Provider: Dawn Dumont FNP        Subjective:     Principal Problem:Generalized weakness        HPI:  Mr. Campo is a 68 Y O male with PMH of Lung cancer (stage 4, with mets to spine) s/p chemo, currently undergoing radiation, HTN, history of CVA with residual right hemiplegia, BPH, tobacco dependence, who presented to ED via EMS with fall and generalized weakness. Patient reports since last 2 days he has gotten progressively weak where his legs will "give out" whenever he tries to walk. He slid down to the floor from the couch last night, unable to get up. Sister called EMS. Patient reports productive cough with brownish sputum and some exertional shortness of breath. No reports of fever, chills, ill contacts. Patient denies chest pain, leg swelling, numbness in lower extremities, bowel or bladder incontinence. He denies head trauma, seizure like activity or LOC. He has generally gotten weaker since his diagnosis of lung cancer exactly a year ago and especially since completing chemotherapy 3 weeks prior to admission. He was started on radiation therapy for his mets to spine, and he has undergone 4 out of 6 sessions. He follows with Dr. Vazquez for his lung cancer.     Overview/Hospital Course:  2/4: Pulmonology evaluated, recommend no thoracentesis until right bronchus obstruction is relieved as the lung is not likely to expand. Will run it by interventional Pulm for options.     Interval History: Patient seen and examined at the bedside, reports feeling ok. Continues to have weakness in his legs.     Review of Systems   Constitutional:  Positive for fatigue.   Respiratory:  Positive for cough.    Neurological:  Positive for weakness.     Objective: "     Vital Signs (Most Recent):  Temp: 98.5 °F (36.9 °C) (02/04/24 0348)  Pulse: 104 (02/04/24 0945)  Resp: 20 (02/04/24 0811)  BP: 119/61 (02/04/24 0603)  SpO2: 96 % (02/04/24 0945) Vital Signs (24h Range):  Temp:  [97.4 °F (36.3 °C)-98.5 °F (36.9 °C)] 98.5 °F (36.9 °C)  Pulse:  [] 104  Resp:  [14-20] 20  SpO2:  [94 %-100 %] 96 %  BP: (102-141)/(61-88) 119/61     Weight: 59 kg (130 lb)  Body mass index is 18.13 kg/m².  No intake or output data in the 24 hours ending 02/04/24 1201      Physical Exam  Vitals and nursing note reviewed.   Constitutional:       Comments: Frail appearing     HENT:      Head: Normocephalic and atraumatic.      Nose: Nose normal.      Mouth/Throat:      Mouth: Mucous membranes are moist.   Eyes:      Extraocular Movements: Extraocular movements intact.   Cardiovascular:      Rate and Rhythm: Regular rhythm. Tachycardia present.      Pulses: Normal pulses.      Heart sounds: Normal heart sounds.   Pulmonary:      Effort: Pulmonary effort is normal.      Breath sounds: Examination of the right-upper field reveals decreased breath sounds. Examination of the right-middle field reveals decreased breath sounds. Examination of the right-lower field reveals decreased breath sounds. Decreased breath sounds present.   Abdominal:      General: Bowel sounds are normal.      Palpations: Abdomen is soft.   Musculoskeletal:         General: Normal range of motion.      Cervical back: Normal range of motion.   Skin:     General: Skin is warm.   Neurological:      General: No focal deficit present.      Mental Status: He is alert and oriented to person, place, and time.      Motor: Weakness present.   Psychiatric:         Mood and Affect: Mood normal.         Behavior: Behavior normal.             Significant Labs: All pertinent labs within the past 24 hours have been reviewed.    Significant Imaging: I have reviewed all pertinent imaging results/findings within the past 24 hours.    Assessment/Plan:       * Generalized weakness  Presented with fall onto bottom, inability to ambulate x 2 days PTA.   Suspect overall debility due to malignancy, chemotherapy.   Known right sided weakness, no red flags on clinical exam.  CT T/L spine with metastatic disease and compression fracture respectively.   PT/OT consulted, possibly to require swing bed placement.       Sepsis due to pneumonia  This patient does have evidence of infective focus  My overall impression is sepsis.  Source: Respiratory  Antibiotics given-   Antibiotics (72h ago, onward)      Start     Stop Route Frequency Ordered    02/04/24 1600  cefTRIAXone (ROCEPHIN) 2 g in dextrose 5 % in water (D5W) 100 mL IVPB (MB+)         02/10/24 1559 IV Every 24 hours (non-standard times) 02/03/24 1606    02/04/24 1600  azithromycin (ZITHROMAX) 500 mg in dextrose 5 % (D5W) 250 mL IVPB         -- IV Every 24 hours (non-standard times) 02/03/24 1606    02/03/24 1800  vancomycin (VANCOCIN) 1,250 mg in dextrose 5 % (D5W) 250 mL IVPB         -- IV Every 18 hours 02/03/24 1618    02/03/24 1717  vancomycin - pharmacy to dose         -- IV pharmacy to manage frequency 02/03/24 1618          Latest lactate reviewed-  Recent Labs   Lab 02/03/24  2042   LACTATE 2.0     Fluid challenge Actual Body weight- Patient will receive 30ml/kg actual body weight to calculate fluid bolus for treatment of septic shock.     Post- resuscitation assessment No - Post resuscitation assessment not needed       Will Not start Pressors- Levophed for MAP of 65  Oxygenation stable on room air.   Source control achieved by: antibiotics (vancomycin, ceftriaxone and azithromycin)- covering broader given recent chemo and underlying lung cancer possibly with post obstruction.   Check MRSA PCR, if negative will discontinue vancomycin.     Malignant neoplasm of right lung  In 11/2022 patient had CXR concerning for right hilar mass, CT chest confirmed the mass with lymph nodes suggestive of malignancy.   S/p  bronchoscopy with biopsy of the mass (2/2023), path showed poorly differentiated adenocarcinoma.  Stage 4 with mets to spine.   CT cervical spine consistent with C2 metastatic lesion.   S/p chemotherapy completion 3 weeks ago and currently receiving radiation therapy for spinal mets (4 out of 6 sessions completed).   Follows with Dr. Vazquez.       Pleural effusion  Patient found to have large pleural effusion on imaging. I have personally reviewed and interpreted the following imaging: Xray. A thoracentesis was deferred. Most likely etiology includes Pneumonia and Metastatic lung cancer .  CT chest done and confirms effusion.  Discussed with Pulm, hold on thoracentesis since there is right mainstem bronchus obstruction from the tumor and the lung is not likely to expand after thoracentesis, and patient is stable from respiratory stand point.  Will discuss with interventional Pulm for further options.     Closed compression fracture of L1 vertebra  CT L-spine showed acute pathologic L1 compression fracture with mild retropulsion, no spinal stenosis reported.  Checking MRI lumbar spine to ensure no cord involvement, clinically patient does not exhibit signs of cord compression.  May require consultation with ortho-spine team.       Tobacco dependence  Dangers of cigarette smoking were reviewed with patient in detail. Patient was Referred to Tobacco Cessation Program. Nicotine replacement options were discussed. Nicotine replacement was discussed- prescribed    DNR (do not resuscitate)  Discussed code status on admission, in the presence of ED RN.   Confirms DNR and do not intubate.   Prognosis is guarded given multiple mets/poorly differentiated lung cancer, consider discussing hospice.       Normocytic anemia  Patient's anemia is currently controlled. Has not received any PRBCs to date. Etiology likely d/t chronic disease due to Malignancy and chemotherapy.  Baseline Hgb prior to being diagnosed with malignancy was ~  12.   Admission Hgb 8.6.   Current CBC reviewed-   Lab Results   Component Value Date    HGB 7.5 (L) 02/04/2024    HCT 24.4 (L) 02/04/2024     B12/folate WNL, LDH/haptoglobin NOT suggestive of hemolysis, iron panel consistent with anemia of chronic disease.   Stool for occult blood is positive, no active bleeding noted.  Underwent colonoscopy (screening) in 2022 but prep was poor and patient was supposed to repeat colonoscopy in 1 year but has not gotten a chance yet.  If develops active GI bleeding or Hgb significantly trend down then we will consult GI for endoscopic evaluation.   Monitor serial CBC and transfuse if patient becomes hemodynamically unstable, symptomatic or H/H drops below 7/21.    Benign prostatic hyperplasia with urinary frequency  Resume home finasteride.       Mass of right lung  Plan as outlined above.       Stage 3b chronic kidney disease  Creatine stable for now. BMP reviewed- noted Estimated Creatinine Clearance: 61.3 mL/min (based on SCr of 0.99 mg/dL). according to latest data. Based on current GFR, CKD stage is stage 3 - GFR 30-59.  Monitor UOP and serial BMP and adjust therapy as needed. Renally dose meds. Avoid nephrotoxic medications and procedures.    Essential (primary) hypertension  Chronic, controlled. Latest blood pressure and vitals reviewed-     Temp:  [97.4 °F (36.3 °C)-98.5 °F (36.9 °C)]   Pulse:  []   Resp:  [14-20]   BP: (102-141)/(61-88)   SpO2:  [94 %-100 %] .   Home meds for hypertension were reviewed and noted below.   Hypertension Medications               amLODIPine (NORVASC) 5 MG tablet Take 1 tablet (5 mg total) by mouth once daily.            While in the hospital, will manage blood pressure as follows; Continue home antihypertensive regimen    Will utilize p.r.n. blood pressure medication only if patient's blood pressure greater than 180/110 and he develops symptoms such as worsening chest pain or shortness of breath.    History of stroke  Residual right  sided weakness.   Follows with Dr. Sanchez.   Resume home aspirin and statin.         VTE Risk Mitigation (From admission, onward)           Ordered     enoxaparin injection 40 mg  Daily         02/03/24 1606     IP VTE HIGH RISK PATIENT  Once         02/03/24 1606     Place sequential compression device  Until discontinued         02/03/24 1606                    Discharge Planning   HEATHER: 2/7/2024     Code Status: DNR   Is the patient medically ready for discharge?:     Reason for patient still in hospital (select all that apply): Patient trending condition and Consult recommendations               PRESTON PADILLA MD  Department of Hospital Medicine   [facility] - Emergency Department

## 2024-02-04 NOTE — SUBJECTIVE & OBJECTIVE
Past Medical History:   Diagnosis Date    Adenocarcinoma of lung     Dx 2/22/23    Chronic bilateral low back pain with right-sided sciatica     Chronic pain of right knee     Essential (primary) hypertension     Hypercholesteremia     Nicotine dependence, cigarettes, uncomplicated     Seizures 2020    last was approx 7 mths ago    Stroke 2015    with residual right sided weakness       Past Surgical History:   Procedure Laterality Date    KNEE ARTHROSCOPY Right 1995       Review of patient's allergies indicates:   Allergen Reactions    Salsalate      Other reaction(s): Abdominal pain    Tramadol      Other reaction(s): Seizure       Family History       Problem Relation (Age of Onset)    Heart disease Mother, Brother    Hyperlipidemia Sister, Sister, Sister, Sister    Hypertension Mother, Sister, Sister, Sister, Sister, Sister, Brother, Brother    Kidney failure Sister    Lung cancer Father    No Known Problems Sister, Daughter, Daughter, Maternal Grandmother, Maternal Grandfather, Paternal Grandmother, Paternal Grandfather    Pancreatic cancer Sister          Tobacco Use    Smoking status: Every Day     Current packs/day: 1.00     Average packs/day: 1 pack/day for 44.0 years (44.0 ttl pk-yrs)     Types: Cigarettes     Passive exposure: Current    Smokeless tobacco: Never   Substance and Sexual Activity    Alcohol use: Yes     Comment: approx 12 pk per week & fifth of vodka per week    Drug use: Yes     Types: Marijuana    Sexual activity: Yes     Birth control/protection: Condom         Review of Systems  Objective:     Vital Signs (Most Recent):  Temp: 98.5 °F (36.9 °C) (02/04/24 0348)  Pulse: 104 (02/04/24 0945)  Resp: 20 (02/04/24 0811)  BP: 119/61 (02/04/24 0603)  SpO2: 96 % (02/04/24 0945) Vital Signs (24h Range):  Temp:  [97.4 °F (36.3 °C)-98.5 °F (36.9 °C)] 98.5 °F (36.9 °C)  Pulse:  [] 104  Resp:  [14-20] 20  SpO2:  [94 %-100 %] 96 %  BP: (102-141)/(61-88) 119/61     Weight: 59 kg (130 lb)  Body mass  index is 18.13 kg/m².    No intake or output data in the 24 hours ending 02/04/24 1007     Physical Exam  Vitals reviewed.   Constitutional:       Appearance: Normal appearance.      Interventions: He is not intubated.  HENT:      Head: Normocephalic and atraumatic.      Nose: Nose normal.      Mouth/Throat:      Mouth: Mucous membranes are dry.      Pharynx: Oropharynx is clear.   Eyes:      Extraocular Movements: Extraocular movements intact.      Conjunctiva/sclera: Conjunctivae normal.      Pupils: Pupils are equal, round, and reactive to light.   Cardiovascular:      Rate and Rhythm: Normal rate.      Heart sounds: Normal heart sounds. No murmur heard.  Pulmonary:      Effort: Pulmonary effort is normal. He is not intubated.      Breath sounds: Normal breath sounds.   Abdominal:      General: Abdomen is flat. Bowel sounds are normal.      Palpations: Abdomen is soft.   Musculoskeletal:         General: Normal range of motion.      Cervical back: Normal range of motion and neck supple.      Right lower leg: No edema.      Left lower leg: No edema.   Skin:     General: Skin is warm and dry.      Capillary Refill: Capillary refill takes less than 2 seconds.   Neurological:      General: No focal deficit present.      Mental Status: He is alert and oriented to person, place, and time.   Psychiatric:         Mood and Affect: Mood normal.         Behavior: Behavior normal.          Vents:       Lines/Drains/Airways       Peripheral Intravenous Line  Duration                  Peripheral IV - Single Lumen 02/03/24 1259 18 G Anterior;Distal;Left Upper Arm <1 day                    Significant Labs:    CBC/Anemia Profile:  Recent Labs   Lab 02/03/24  1330 02/04/24  0432   WBC 19.58* 16.03*   HGB 8.6* 7.5*   HCT 27.3* 24.4*   * 428*   MCV 93.5 97.2*   RDW 16.3* 16.5*   IRON 19*  --    FOLATE 18.9*  --    [id number] 500  --         Chemistries:  Recent Labs   Lab 02/03/24  1330 02/04/24  0432   * 136   K 4.2  3.8    105   CO2 25 24   BUN 33* 26*   CREATININE 1.03 0.99   CALCIUM 9.2 8.7   ALBUMIN  --  1.6*   PROT  --  6.4   BILITOT  --  0.2   ALKPHOS  --  131*   ALT  --  10*   AST  --  43*       Recent Lab Results  (Last 5 results in the past 24 hours)        02/04/24  0432   02/03/24  2042   02/03/24  1940   02/03/24  1805   02/03/24  1601        Influenza B, Molecular         Negative       Albumin/Globulin Ratio 0.3               Albumin 1.6                              ALT 10               Anion Gap 11               AST 43               Baso # 0.02               Basophil % 0.1               BILIRUBIN TOTAL 0.2               BUN 26               BUN/CREAT RATIO 26               Calcium 8.7               Chloride 105               CO2 24               ID NOW COVID-19, ([facility])         Negative       Creatinine 0.99               Differential Method Auto               eGFR 84               Eos # 0.00               Eos % 0.0               Fecal Occult Blood     Positive           Globulin, Total 4.8               Glucose 119               Hematocrit 24.4               Hemoglobin 7.5               Immature Grans (Abs) 0.11               Immature Granulocytes 0.7               Influenza A         Negative       Lactic Acid Level   2.0     2.8  Comment: A repeat order for Lactic Acid has been placed for collection in 2 hours.   2.3  Comment: A repeat order for Lactic Acid has been placed for collection in 2 hours.       Lymph # 0.23               Lymph % 1.4               MCH 29.9               MCHC 30.7               MCV 97.2               Mono # 0.97               Mono % 6.1               MPV 10.0               Neutrophils, Abs 14.70               Neutrophils Relative 91.7               nRBC 0.0               NUCLEATED RBC ABSOLUTE 0.00               Platelet Count 428               Potassium 3.8               PROTEIN TOTAL 6.4               RBC 2.51               RDW 16.5               Sodium 136                WBC 16.03                                      Significant Imaging:   I have reviewed all pertinent imaging results/findings within the past 24 hours.

## 2024-02-04 NOTE — ASSESSMENT & PLAN NOTE
Discussed code status on admission, in the presence of ED RN.   Confirms DNR and do not intubate.   Prognosis is guarded given multiple mets/poorly differentiated lung cancer, consider discussing hospice.

## 2024-02-04 NOTE — PT/OT/SLP PROGRESS
Occupational Therapy      Patient Name:  Steven Campo   MRN:  [id number]    Patient not seen today secondary to Patient unwilling to participate due to not feeling well. Will follow-up 2/5/24.    2/4/2024

## 2024-02-05 NOTE — CONSULTS
Gastroenterology Consult Note    Chief Complaint: acute on chronic anemia     Consulted by:   Dr. Cruz     HPI:  Steven Campo is a 68 y.o. AAM with MMP including poorly differentiated, metastatic lung cancer that presented from home with weakness and reported fall with incidental anemia now requiring 1u pRBC transfusion. Patient denies NSAIDs, dysphagia, N//V, hematemesis/CGE, abdominal pain, hematochezia/melena. He has no acute complaints on my exam. No prior GIB or EGD. He had a colonoscopy with me in 2022 with fair prep. No [facility] GI related cancer.     Review of Systems   Respiratory:  Positive for shortness of breath.    Gastrointestinal:  Negative for abdominal pain, blood in stool, constipation, diarrhea, heartburn, melena, nausea and vomiting.   Neurological:  Positive for weakness.   All other systems reviewed and are negative.      Past Medical History:   Diagnosis Date    Adenocarcinoma of lung     Dx 2/22/23    Chronic bilateral low back pain with right-sided sciatica     Chronic pain of right knee     Essential (primary) hypertension     Hypercholesteremia     Nicotine dependence, cigarettes, uncomplicated     Seizures 2020    last was approx 7 mths ago    Stroke 2015    with residual right sided weakness     Past Surgical History:   Procedure Laterality Date    KNEE ARTHROSCOPY Right 1995     Family History   Problem Relation Age of Onset    Hypertension Mother     Heart disease Mother         mild MI, later in life    Lung cancer Father     Pancreatic cancer Sister     Hyperlipidemia Sister     Hypertension Sister     Kidney failure Sister     Hypertension Sister     Hyperlipidemia Sister     Hypertension Sister     Hyperlipidemia Sister     Hypertension Sister     Hyperlipidemia Sister     Hypertension Sister     No Known Problems Sister     Heart disease Brother     Hypertension Brother     Hypertension Brother     No Known Problems Daughter     No Known Problems Daughter     No Known Problems  "Maternal Grandmother     No Known Problems Maternal Grandfather     No Known Problems Paternal Grandmother     No Known Problems Paternal Grandfather        OBJECTIVE:  /72   Pulse 109   Temp 98.6 °F (37 °C) (Oral)   Resp 18   Ht 5' 11" (1.803 m)   Wt 62.4 kg (137 lb 9.1 oz)   SpO2 97%   BMI 19.19 kg/m²   GEN: chronically ill appearing, cooperative, NAD  HEENT: NCAT, poor dentition, MMM  NECK: Supple, no LAD  CV: tachycardic, regular rhythm  RESP: decreased BS Rt lung, unlabored on RA  ABD: NABS, ND, NT, soft, no guarding  EXT: No clubbing, cyanosis, or edema.  SKIN: Warm and dry  NEURO: AAO x4. Grossly afocal.    LABS:  CMP  Sodium   Date Value Ref Range Status   02/05/2024 135 (L) 136 - 145 mmol/L Final     Potassium   Date Value Ref Range Status   02/05/2024 3.8 3.5 - 5.1 mmol/L Final     Chloride   Date Value Ref Range Status   02/05/2024 105 98 - 107 mmol/L Final     CO2   Date Value Ref Range Status   02/05/2024 23 21 - 32 mmol/L Final     Glucose   Date Value Ref Range Status   02/05/2024 112 (H) 74 - 106 mg/dL Final     BUN   Date Value Ref Range Status   02/05/2024 19 (H) 7 - 18 mg/dL Final     Creatinine   Date Value Ref Range Status   02/05/2024 0.79 0.70 - 1.30 mg/dL Final     Calcium   Date Value Ref Range Status   02/05/2024 8.7 8.5 - 10.1 mg/dL Final     Total Protein   Date Value Ref Range Status   02/05/2024 6.1 (L) 6.4 - 8.2 g/dL Final     Albumin   Date Value Ref Range Status   02/05/2024 1.6 (L) 3.5 - 5.0 g/dL Final     Bilirubin, Total   Date Value Ref Range Status   02/05/2024 0.2 >0.0 - 1.2 mg/dL Final     Alk Phos   Date Value Ref Range Status   02/05/2024 118 (H) 45 - 115 U/L Final     AST   Date Value Ref Range Status   02/05/2024 45 (H) 15 - 37 U/L Final     ALT   Date Value Ref Range Status   02/05/2024 14 (L) 16 - 61 U/L Final     Anion Gap   Date Value Ref Range Status   02/05/2024 11 7 - 16 mmol/L Final     eGFR   Date Value Ref Range Status   02/05/2024 98 >=60 " "mL/min/1.73m2 Final     Recent Results (from the past 336 hour(s))   CBC with Differential    Collection Time: 02/05/24  4:02 AM   Result Value Ref Range    WBC 12.55 (H) 4.50 - 11.00 K/uL    Hemoglobin 6.7 (L) 13.5 - 18.0 g/dL    Hematocrit 21.8 (L) 40.0 - 54.0 %    Platelet Count 351 150 - 400 K/uL   CBC with Differential    Collection Time: 02/04/24  4:32 AM   Result Value Ref Range    WBC 16.03 (H) 4.50 - 11.00 K/uL    Hemoglobin 7.5 (L) 13.5 - 18.0 g/dL    Hematocrit 24.4 (L) 40.0 - 54.0 %    Platelet Count 428 (H) 150 - 400 K/uL   CBC with Differential    Collection Time: 02/03/24  1:30 PM   Result Value Ref Range    WBC 19.58 (H) 4.50 - 11.00 K/uL    Hemoglobin 8.6 (L) 13.5 - 18.0 g/dL    Hematocrit 27.3 (L) 40.0 - 54.0 %    Platelet Count 483 (H) 150 - 400 K/uL     No results found for: "INR", "PROTIME"    IMAGING:    CT Chest WO  - Complete opacification of the right hemithorax secondary to right lung atelectasis and right pleural effusion.  There is opacification of the distal most aspect of the right mainstem bronchus.  - Worsening small pulmonary metastases  - Lytic metastatic lesion T8 spinous process as noted on the thoracic spine CT    ASSESSMENT:    68 y.o. AAM with MMP including poorly differentiated, metastatic lung cancer that presented from home with weakness and reported fall with incidental anemia now requiring 1u pRBC transfusion.     PLAN:    Acute on Chronic Anemia  - Hgb 6.7 (7.5), BUN 19 (33), Plt normal, no overt bleeding, appropriate response to 1u pRBC; FOBT+ with hemorrhoids   - anemia panel consistent with AOCDz related to cancer   - given metastatic cancer and imaging findings with opacification of Rt hemithorax, I would recommend to avoid endoscopy unless he develops overt bleeding with significant drop in Hgb requiring another transfusion; ideally would opt for conservative medical management   - Increase PPI to BID dosing; NPO p MN - if he has overt bleeding with a significant " drop in H&H requiring another transfusion, would consider EGD tomorrow to r/o GI source and limit transfusions      Thank you for the consult and including me in the care of this patient. Please call me with questions.     Marko Hammond MD  Gastroenterology

## 2024-02-05 NOTE — PLAN OF CARE
Problem: Physical Therapy  Goal: Physical Therapy Goal  Description: Short Term Goals to be met by: 24    Patient will increase functional independence with mobility by performin. Supine to sit with Stand by assist  2. Sit to stand transfer with contact guard assist using Rolling walker  3. Bed to chair transfer with contact guard assist using Rolling walker  4. Gait  x 100 feet with contact guard assist using Rolling walker  5. Lower extremity exercise program x30 reps per handout, with assistance as needed    Long Term Goals to be met by: 3/5/24    Pt will regain full independent functional mobility with Rolling walker to return to home situation and prior activities of daily living.   Outcome: Ongoing, Progressing     PT eval completed. Please see eval note for details.

## 2024-02-05 NOTE — SUBJECTIVE & OBJECTIVE
Past Medical History:   Diagnosis Date    Adenocarcinoma of lung     Dx 2/22/23    Chronic bilateral low back pain with right-sided sciatica     Chronic pain of right knee     Essential (primary) hypertension     Hypercholesteremia     Nicotine dependence, cigarettes, uncomplicated     Seizures 2020    last was approx 7 mths ago    Stroke 2015    with residual right sided weakness       Past Surgical History:   Procedure Laterality Date    KNEE ARTHROSCOPY Right 1995       Review of patient's allergies indicates:   Allergen Reactions    Salsalate      Other reaction(s): Abdominal pain    Tramadol      Other reaction(s): Seizure       Family History       Problem Relation (Age of Onset)    Heart disease Mother, Brother    Hyperlipidemia Sister, Sister, Sister, Sister    Hypertension Mother, Sister, Sister, Sister, Sister, Sister, Brother, Brother    Kidney failure Sister    Lung cancer Father    No Known Problems Sister, Daughter, Daughter, Maternal Grandmother, Maternal Grandfather, Paternal Grandmother, Paternal Grandfather    Pancreatic cancer Sister          Tobacco Use    Smoking status: Every Day     Current packs/day: 1.00     Average packs/day: 1 pack/day for 44.0 years (44.0 ttl pk-yrs)     Types: Cigarettes     Passive exposure: Current    Smokeless tobacco: Never   Substance and Sexual Activity    Alcohol use: Yes     Comment: approx 12 pk per week & fifth of vodka per week    Drug use: Yes     Types: Marijuana    Sexual activity: Yes     Birth control/protection: Condom         Review of Systems  Objective:     Vital Signs (Most Recent):  Temp: 99.2 °F (37.3 °C) (02/05/24 1115)  Pulse: 102 (02/05/24 1115)  Resp: 14 (02/05/24 1115)  BP: 122/81 (02/05/24 1115)  SpO2: 99 % (02/05/24 1115) Vital Signs (24h Range):  Temp:  [98 °F (36.7 °C)-99.5 °F (37.5 °C)] 99.2 °F (37.3 °C)  Pulse:  [] 102  Resp:  [13-20] 14  SpO2:  [94 %-100 %] 99 %  BP: ()/(68-84) 122/81     Weight: 62.4 kg (137 lb 9.1  oz)  Body mass index is 19.19 kg/m².      Intake/Output Summary (Last 24 hours) at 2/5/2024 1133  Last data filed at 2/5/2024 0150  Gross per 24 hour   Intake 250 ml   Output 300 ml   Net -50 ml        Physical Exam  Constitutional:       General: He is not in acute distress.     Appearance: Normal appearance. He is normal weight. He is not ill-appearing or diaphoretic.   HENT:      Head: Normocephalic and atraumatic.      Nose: No congestion or rhinorrhea.      Mouth/Throat:      Mouth: Mucous membranes are moist.   Cardiovascular:      Rate and Rhythm: Normal rate and regular rhythm.      Pulses: Normal pulses.      Heart sounds: Normal heart sounds.   Pulmonary:      Effort: Pulmonary effort is normal. No respiratory distress.      Breath sounds: No stridor. No wheezing, rhonchi or rales.      Comments:   Decreased breath sounds right hemithorax  Chest:      Chest wall: No tenderness.   Abdominal:      General: Abdomen is flat.   Musculoskeletal:      Cervical back: Normal range of motion. No rigidity.      Right lower leg: No edema.      Left lower leg: No edema.   Skin:     General: Skin is warm.      Findings: No erythema.   Neurological:      General: No focal deficit present.      Mental Status: He is alert and oriented to person, place, and time. Mental status is at baseline.   Psychiatric:         Mood and Affect: Mood normal.         Behavior: Behavior normal.         Thought Content: Thought content normal.          Vents:       Lines/Drains/Airways       Peripheral Intravenous Line  Duration                  Peripheral IV - Single Lumen 02/03/24 1259 18 G Anterior;Distal;Left Upper Arm 1 day                    Significant Labs:    CBC/Anemia Profile:  Recent Labs   Lab 02/03/24  1330 02/04/24  0432 02/05/24  0402   WBC 19.58* 16.03* 12.55*   HGB 8.6* 7.5* 6.7*   HCT 27.3* 24.4* 21.8*   * 428* 351   MCV 93.5 97.2* 96.9*   RDW 16.3* 16.5* 16.4*   IRON 19*  --   --    FOLATE 18.9*  --   --     [id number] 500  --   --         Chemistries:  Recent Labs   Lab 02/03/24  1330 02/04/24  0432 02/05/24  0402   * 136 135*   K 4.2 3.8 3.8    105 105   CO2 25 24 23   BUN 33* 26* 19*   CREATININE 1.03 0.99 0.79   CALCIUM 9.2 8.7 8.7   ALBUMIN  --  1.6* 1.6*   PROT  --  6.4 6.1*   BILITOT  --  0.2 0.2   ALKPHOS  --  131* 118*   ALT  --  10* 14*   AST  --  43* 45*       All pertinent labs within the past 24 hours have been reviewed. I independently reviewed the patient's results today which showed evidence of drop in hemoglobin to 6.7 from 7.5, improvement in patient's leukocytosis, mostly stable electrolytes and stable renal function.  Lactate improved to 2.0 with fecal occult blood positive.      I also reviewed the patient's CT chest from 2/3/24 which is consistent with worsening pulmonary metastases, large right-sided pleural effusion and opacification of the right mainstem bronchus concerning for mucus plug versus worsening endobronchial disease.    Significant Imaging:   I have reviewed all pertinent imaging results/findings within the past 24 hours.

## 2024-02-05 NOTE — SUBJECTIVE & OBJECTIVE
Interval History: Patient seen and examined at the bedside, doing ok, encouraged him to work with PT/OT.     Review of Systems   Constitutional:  Positive for fatigue.   Respiratory:  Positive for cough.    Neurological:  Positive for weakness.     Objective:     Vital Signs (Most Recent):  Temp: 99 °F (37.2 °C) (02/05/24 1220)  Pulse: 106 (02/05/24 1220)  Resp: 14 (02/05/24 1220)  BP: 112/77 (02/05/24 1220)  SpO2: 97 % (02/05/24 1220) Vital Signs (24h Range):  Temp:  [98 °F (36.7 °C)-99.5 °F (37.5 °C)] 99 °F (37.2 °C)  Pulse:  [] 106  Resp:  [13-20] 14  SpO2:  [94 %-100 %] 97 %  BP: ()/(68-84) 112/77     Weight: 62.4 kg (137 lb 9.1 oz)  Body mass index is 19.19 kg/m².    Intake/Output Summary (Last 24 hours) at 2/5/2024 1331  Last data filed at 2/5/2024 1219  Gross per 24 hour   Intake 250 ml   Output 750 ml   Net -500 ml         Physical Exam  Vitals and nursing note reviewed.   Constitutional:       Comments: Frail appearing     HENT:      Head: Normocephalic and atraumatic.      Nose: Nose normal.      Mouth/Throat:      Mouth: Mucous membranes are moist.   Eyes:      Extraocular Movements: Extraocular movements intact.   Cardiovascular:      Rate and Rhythm: Regular rhythm. Tachycardia present.      Pulses: Normal pulses.      Heart sounds: Normal heart sounds.   Pulmonary:      Effort: Pulmonary effort is normal.      Breath sounds: Examination of the right-upper field reveals decreased breath sounds. Examination of the right-middle field reveals decreased breath sounds. Examination of the right-lower field reveals decreased breath sounds. Decreased breath sounds present.   Abdominal:      General: Bowel sounds are normal.      Palpations: Abdomen is soft.   Musculoskeletal:         General: Normal range of motion.      Cervical back: Normal range of motion.   Skin:     General: Skin is warm.   Neurological:      General: No focal deficit present.      Mental Status: He is alert and oriented to  person, place, and time.      Motor: Weakness present.   Psychiatric:         Mood and Affect: Mood normal.         Behavior: Behavior normal.             Significant Labs: All pertinent labs within the past 24 hours have been reviewed.    Significant Imaging: I have reviewed all pertinent imaging results/findings within the past 24 hours.

## 2024-02-05 NOTE — PLAN OF CARE
Problem: Adult Inpatient Plan of Care  Goal: Plan of Care Review  Outcome: Ongoing, Progressing  Goal: Patient-Specific Goal (Individualized)  Outcome: Ongoing, Progressing  Goal: Absence of Hospital-Acquired Illness or Injury  Outcome: Ongoing, Progressing  Goal: Optimal Comfort and Wellbeing  Outcome: Ongoing, Progressing  Goal: Readiness for Transition of Care  Outcome: Ongoing, Progressing     Problem: Skin Injury Risk Increased  Goal: Skin Health and Integrity  Outcome: Ongoing, Progressing     Problem: Airway Clearance Ineffective  Goal: Effective Airway Clearance  Outcome: Ongoing, Progressing     Problem: Adjustment to Illness (Sepsis/Septic Shock)  Goal: Optimal Coping  Outcome: Ongoing, Progressing     Problem: Bleeding (Sepsis/Septic Shock)  Goal: Absence of Bleeding  Outcome: Ongoing, Progressing     Problem: Glycemic Control Impaired (Sepsis/Septic Shock)  Goal: Blood Glucose Level Within Desired Range  Outcome: Ongoing, Progressing     Problem: Infection Progression (Sepsis/Septic Shock)  Goal: Absence of Infection Signs and Symptoms  Outcome: Ongoing, Progressing     Problem: Nutrition Impaired (Sepsis/Septic Shock)  Goal: Optimal Nutrition Intake  Outcome: Ongoing, Progressing     Problem: Fluid Imbalance (Pneumonia)  Goal: Fluid Balance  Outcome: Ongoing, Progressing     Problem: Infection (Pneumonia)  Goal: Resolution of Infection Signs and Symptoms  Outcome: Ongoing, Progressing     Problem: Respiratory Compromise (Pneumonia)  Goal: Effective Oxygenation and Ventilation  Outcome: Ongoing, Progressing     Problem: Impaired Wound Healing  Goal: Optimal Wound Healing  Outcome: Ongoing, Progressing     Problem: Gas Exchange Impaired  Goal: Optimal Gas Exchange  Outcome: Ongoing, Progressing

## 2024-02-05 NOTE — ASSESSMENT & PLAN NOTE
Creatine stable for now. BMP reviewed- noted Estimated Creatinine Clearance: 81.2 mL/min (based on SCr of 0.79 mg/dL). according to latest data. Based on current GFR, CKD stage is stage 3 - GFR 30-59.  Monitor UOP and serial BMP and adjust therapy as needed. Renally dose meds. Avoid nephrotoxic medications and procedures.

## 2024-02-05 NOTE — ASSESSMENT & PLAN NOTE
Dangers of cigarette smoking were reviewed with patient in detail. Patient was Referred to Tobacco Cessation Program. Nicotine replacement options were discussed. Nicotine replacement was discussed- prescribed   DVT ppx: Continue home Xarelto  Fall precautions DVT ppx: Continue home Xarelto  Fall precautions    11. Pulmonary nodule:  - new RUL nodule   - pt will f/up with pulm Dr. Singh, as outpt and likely need CT surgery eval as outpt   -pulm recs appreciated    12. Chronic urinary retention:   -Alvarado inserted this admission; hematuria today, maybe due to alvarado trauma;   -will monitor H&H   -consider adjustments to a/c and possible urology consult if hematuria persists  - becoming lighter / tea-colored during the course of the day    13. EKG abnormalities:   - LBBB noted on EKG. not on prior EKG 2018; no other signs of acute ischemia  - no active CP, SOB   - CE negative x 3  - cardio recs appreciated

## 2024-02-05 NOTE — PROGRESS NOTES
[facility]  Pulmonology  Progress Note    Patient Name: Steven Campo  MRN: [id number]  Admission Date: 2/3/2024  Hospital Length of Stay: 2 days  Code Status: DNR  Attending Provider: Rafael Cruz MD  Primary Care Provider: Dawn Dumont FNP   Principal Problem: Generalized weakness    Subjective:     Past Medical History:   Diagnosis Date    Adenocarcinoma of lung     Dx 2/22/23    Chronic bilateral low back pain with right-sided sciatica     Chronic pain of right knee     Essential (primary) hypertension     Hypercholesteremia     Nicotine dependence, cigarettes, uncomplicated     Seizures 2020    last was approx 7 mths ago    Stroke 2015    with residual right sided weakness       Past Surgical History:   Procedure Laterality Date    KNEE ARTHROSCOPY Right 1995       Review of patient's allergies indicates:   Allergen Reactions    Salsalate      Other reaction(s): Abdominal pain    Tramadol      Other reaction(s): Seizure       Family History       Problem Relation (Age of Onset)    Heart disease Mother, Brother    Hyperlipidemia Sister, Sister, Sister, Sister    Hypertension Mother, Sister, Sister, Sister, Sister, Sister, Brother, Brother    Kidney failure Sister    Lung cancer Father    No Known Problems Sister, Daughter, Daughter, Maternal Grandmother, Maternal Grandfather, Paternal Grandmother, Paternal Grandfather    Pancreatic cancer Sister          Tobacco Use    Smoking status: Every Day     Current packs/day: 1.00     Average packs/day: 1 pack/day for 44.0 years (44.0 ttl pk-yrs)     Types: Cigarettes     Passive exposure: Current    Smokeless tobacco: Never   Substance and Sexual Activity    Alcohol use: Yes     Comment: approx 12 pk per week & fifth of vodka per week    Drug use: Yes     Types: Marijuana    Sexual activity: Yes     Birth control/protection: Condom         Review of Systems  Objective:     Vital Signs (Most Recent):  Temp: 99.2 °F (37.3 °C) (02/05/24  1115)  Pulse: 102 (02/05/24 1115)  Resp: 14 (02/05/24 1115)  BP: 122/81 (02/05/24 1115)  SpO2: 99 % (02/05/24 1115) Vital Signs (24h Range):  Temp:  [98 °F (36.7 °C)-99.5 °F (37.5 °C)] 99.2 °F (37.3 °C)  Pulse:  [] 102  Resp:  [13-20] 14  SpO2:  [94 %-100 %] 99 %  BP: ()/(68-84) 122/81     Weight: 62.4 kg (137 lb 9.1 oz)  Body mass index is 19.19 kg/m².      Intake/Output Summary (Last 24 hours) at 2/5/2024 1133  Last data filed at 2/5/2024 0150  Gross per 24 hour   Intake 250 ml   Output 300 ml   Net -50 ml        Physical Exam  Constitutional:       General: He is not in acute distress.     Appearance: Normal appearance. He is normal weight. He is not ill-appearing or diaphoretic.   HENT:      Head: Normocephalic and atraumatic.      Nose: No congestion or rhinorrhea.      Mouth/Throat:      Mouth: Mucous membranes are moist.   Cardiovascular:      Rate and Rhythm: Normal rate and regular rhythm.      Pulses: Normal pulses.      Heart sounds: Normal heart sounds.   Pulmonary:      Effort: Pulmonary effort is normal. No respiratory distress.      Breath sounds: No stridor. No wheezing, rhonchi or rales.      Comments:   Decreased breath sounds right hemithorax  Chest:      Chest wall: No tenderness.   Abdominal:      General: Abdomen is flat.   Musculoskeletal:      Cervical back: Normal range of motion. No rigidity.      Right lower leg: No edema.      Left lower leg: No edema.   Skin:     General: Skin is warm.      Findings: No erythema.   Neurological:      General: No focal deficit present.      Mental Status: He is alert and oriented to person, place, and time. Mental status is at baseline.   Psychiatric:         Mood and Affect: Mood normal.         Behavior: Behavior normal.         Thought Content: Thought content normal.          Vents:       Lines/Drains/Airways       Peripheral Intravenous Line  Duration                  Peripheral IV - Single Lumen 02/03/24 1259 18 G Anterior;Distal;Left  Upper Arm 1 day                    Significant Labs:    CBC/Anemia Profile:  Recent Labs   Lab 02/03/24  1330 02/04/24  0432 02/05/24  0402   WBC 19.58* 16.03* 12.55*   HGB 8.6* 7.5* 6.7*   HCT 27.3* 24.4* 21.8*   * 428* 351   MCV 93.5 97.2* 96.9*   RDW 16.3* 16.5* 16.4*   IRON 19*  --   --    FOLATE 18.9*  --   --    [id number] 500  --   --         Chemistries:  Recent Labs   Lab 02/03/24  1330 02/04/24  0432 02/05/24  0402   * 136 135*   K 4.2 3.8 3.8    105 105   CO2 25 24 23   BUN 33* 26* 19*   CREATININE 1.03 0.99 0.79   CALCIUM 9.2 8.7 8.7   ALBUMIN  --  1.6* 1.6*   PROT  --  6.4 6.1*   BILITOT  --  0.2 0.2   ALKPHOS  --  131* 118*   ALT  --  10* 14*   AST  --  43* 45*       All pertinent labs within the past 24 hours have been reviewed. I independently reviewed the patient's results today which showed evidence of drop in hemoglobin to 6.7 from 7.5, improvement in patient's leukocytosis, mostly stable electrolytes and stable renal function.  Lactate improved to 2.0 with fecal occult blood positive.      I also reviewed the patient's CT chest from 2/3/24 which is consistent with worsening pulmonary metastases, large right-sided pleural effusion and opacification of the right mainstem bronchus concerning for mucus plug versus worsening endobronchial disease.    Significant Imaging:   I have reviewed all pertinent imaging results/findings within the past 24 hours.  Assessment/Plan:     Pulmonary  Pleural effusion   He has a large right-sided pleural effusion which is likely in the setting of compressive atelectasis from his endobronchial disease versus malignant pleural effusion.  He also has a mediastinal shift.  When compared to his initial CT from November 2022, he had a very similar obstruction of the bronchus intermedius.  At this time his endobronchial obstruction appears to be very similar however he does have increasing pleural effusion which may be in the setting of compressive  atelectasis.    He did have evidence of endobronchial disease during his bronchoscopy in February 2023.    Given that he is minimally symptomatic at this time, stenting the bronchus intermedius is unlikely to be of any additional benefit.  He is yet to complete his radiation treatment at this time and was most recently on systemic treatment for his lung cancer.  His mild dyspnea may be improved by helping with his pleural effusion (which may benefit by placement of PleurX catheter).    Recommendations   Continue to monitor hemoglobin/hematocrit and coagulation studies to ensure stabilization  We will plan to perform airway exam with bronchoalveolar lavage (for concern regarding postobstructive pneumonia) under general anesthesia on 2/7/24   Under the same general anesthesia, we will  consider placement of a PleurX catheter in the right pleural space     Images below compares the bronchus intermedius obstruction in November 2022 to February 2024.  Increasing pleural effusion (which may be contributing to the patient's mild dyspnea and may possibly be parapneumonic or secondary to the compressive atelectasis, or a malignant pleural effusion given the ipsilateral lung cancer)    Mass of right lung, stage IV adenocarcinoma of the lung currently on treatment    As above    Palliative Care  DNR (do not resuscitate)   This will need to be  Temporarily  reversed for the procedure.                 Gonzalo Robert MD  Pulmonology  [facility]

## 2024-02-05 NOTE — ASSESSMENT & PLAN NOTE
Patient's anemia is currently controlled. Received 1 unit PRBC on 2/5. Etiology likely d/t chronic disease due to Malignancy and chemotherapy.  Baseline Hgb prior to being diagnosed with malignancy was ~ 12.   Admission Hgb 8.6.   Current CBC reviewed-   Lab Results   Component Value Date    HGB 6.7 (L) 02/05/2024    HCT 21.8 (L) 02/05/2024     B12/folate WNL, LDH/haptoglobin NOT suggestive of hemolysis, iron panel consistent with anemia of chronic disease.   Stool for occult blood is positive, no active bleeding noted.  Underwent colonoscopy (screening) in 2022 but prep was poor and patient was supposed to repeat colonoscopy in 1 year but has not gotten a chance yet.  Hgb dropped to 6.7 on 2/5, GI consulted to consider endoscopic evaluation.  Hold home aspirin and Lovenox for VTE prophylaxis.   Monitor serial CBC and transfuse if patient becomes hemodynamically unstable, symptomatic or H/H drops below 7/21.

## 2024-02-05 NOTE — ASSESSMENT & PLAN NOTE
Chronic, controlled. Latest blood pressure and vitals reviewed-     Temp:  [98 °F (36.7 °C)-99.5 °F (37.5 °C)]   Pulse:  []   Resp:  [13-20]   BP: ()/(68-84)   SpO2:  [94 %-100 %] .   Home meds for hypertension were reviewed and noted below.   Hypertension Medications               amLODIPine (NORVASC) 5 MG tablet Take 1 tablet (5 mg total) by mouth once daily.            While in the hospital, will manage blood pressure as follows; Continue home antihypertensive regimen    Will utilize p.r.n. blood pressure medication only if patient's blood pressure greater than 180/110 and he develops symptoms such as worsening chest pain or shortness of breath.

## 2024-02-05 NOTE — PLAN OF CARE
Problem: Occupational Therapy  Goal: Occupational Therapy Goal  Description: STG:  Pt will be (I) with grooming  Pt will bathe with min a  Pt will perform UE dressing with (I)  Pt will perform LE dressing with I/Mod I  Pt will sit EOB x 10 min (I)  Pt will transfer bed/chair/bsc with CGA with RW  Pt will perform standing task x 2 min with CGA   Pt will tolerate 15 minutes of tx without fatigue      LT.Restore to max I with self care and mobility.     Outcome: Ongoing, Progressing

## 2024-02-05 NOTE — HOSPITAL COURSE
2/5/24-Interventional Pulmonary consulted.  Patient continues to remain stable, without any acute distress.    2/7/24-patient doing well, saturating well on room air however continues to remain dyspneic when favoring right side down.

## 2024-02-05 NOTE — ASSESSMENT & PLAN NOTE
Presented with fall onto bottom, inability to ambulate x 2 days PTA.   Suspect overall debility due to malignancy, chemotherapy.   Known right sided weakness, no red flags on clinical exam.  CT T/L spine with metastatic disease and compression fracture respectively.   PT/OT consulted, possibly to require swing bed placement.

## 2024-02-05 NOTE — PT/OT/SLP EVAL
Physical Therapy Evaluation and Treatment    Patient Name: Steven Campo   MRN: [id number]  Recent Surgery: * No surgery found *      Recommendations:     Discharge Recommendations: Moderate Intensity Therapy   Discharge Equipment Recommendations: none   Barriers to discharge: Decreased caregiver support and Ongoing medical treatment    Assessment:     Steven Campo is a 68 y.o. male admitted with a medical diagnosis of Generalized weakness. He presents with the following impairments/functional limitations: weakness, impaired endurance, impaired functional mobility, gait instability, impaired balance, decreased lower extremity function, pain. Pt is currently undergoing treatment for stage IV lung cancer with last chemo 3wks ago and pt is currently receiving radiation, 4 treatments thus far. Pt lives alone and drives himself to appointments, per his report.     Rehab Prognosis: Good; patient would benefit from acute PT services to address these deficits and reach maximum level of function.    Plan:     During this hospitalization, patient to be seen 5 x/week to address the above listed problems via gait training, therapeutic activities, therapeutic exercises, neuromuscular re-education    Plan of Care Expires: 03/05/24    Subjective     Chief Complaint: generalized weakness  Patient Comments/Goals: agreeable to eval  Pain/Comfort:  Pain Rating 1: 7/10  Location 1: back    Social History:  Living Environment: Patient lives alone in a single story home with ramped  Prior Level of Function: Prior to admission, patient was modified independent and driving, disabled, and using a rollator  Equipment Used at Home: rollator, grab bar, shower chair, wheelchair  DME owned (not currently used): none  Assistance Upon Discharge: facility staff    Objective:     Communicated with JESU Pickett RN prior to session. Patient found HOB elevated with SCD, peripheral IV upon PT entry to room.    General Precautions: Standard, fall   Orthopedic  Precautions: N/A   Braces: N/A    Respiratory Status: Room air    Exams:  Cognition: Patient is oriented to Person, Place, Time, Situation  RLE ROM: WFL  RLE Strength: Deficits: 3+/5  LLE ROM: WFL  LLE Strength: Deficits: 4-/5  Sensation:    -       Intact    Functional Mobility:  Gait belt applied - Yes  Bed Mobility  Rolling Left: contact guard assistance  Supine to Sit: minimum assistance for LE management and trunk management  Sit to Supine: contact guard assistance and minimum assistance for LE management and trunk management  Transfers  Sit to Stand: minimum assistance and of 2 persons with rolling walker and with cues for hand placement and foot placement  Gait  Patient ambulated 3ft-5ft with rolling walker and minimum assistance. Patient demonstrates occasional unsteady gait, decreased step length, decreased foot clearance, flexed posture, and inconsistent right foot placement. . All lines remained intact throughout ambulation trail.  Balance  Sitting: contact guard assistance  Standing: minimum assistance and of 2 persons      Therapeutic Activities and Exercises:   Patient educated on role of acute care PT and PT POC, safety while in hospital including calling nurse for mobility, and call light usage  Patient educated about importance of OOB mobility and remaining up in chair most of the day.  Pt instructed in multiple trials of supine<>sit with cues for sequencing and improving efficiency of transfer    AM-PAC 6 CLICK MOBILITY  Total Score:17    Patient left up in chair with all lines intact, call button in reach, RN notified, and family present.    GOALS:   Multidisciplinary Problems       Physical Therapy Goals          Problem: Physical Therapy    Goal Priority Disciplines Outcome Goal Variances Interventions   Physical Therapy Goal     PT, PT/OT Ongoing, Progressing     Description: Short Term Goals to be met by: 2/19/24    Patient will increase functional independence with mobility by  performin. Supine to sit with Stand by assist  2. Sit to stand transfer with contact guard assist using Rolling walker  3. Bed to chair transfer with contact guard assist using Rolling walker  4. Gait  x 100 feet with contact guard assist using Rolling walker  5. Lower extremity exercise program x30 reps per handout, with assistance as needed    Long Term Goals to be met by: 3/5/24    Pt will regain full independent functional mobility with Rolling walker to return to home situation and prior activities of daily living.                        History:     Past Medical History:   Diagnosis Date    Adenocarcinoma of lung     Dx 23    Chronic bilateral low back pain with right-sided sciatica     Chronic pain of right knee     Essential (primary) hypertension     Hypercholesteremia     Nicotine dependence, cigarettes, uncomplicated     Seizures     last was approx 7 mths ago    Stroke     with residual right sided weakness       Past Surgical History:   Procedure Laterality Date    KNEE ARTHROSCOPY Right        Time Tracking:     PT Received On: 24  PT Start Time: 1003  PT Stop Time: 1032  PT Total Time (min): 29 min     Billable Minutes: Evaluation 15 and Therapeutic Activity 14    2024

## 2024-02-05 NOTE — ASSESSMENT & PLAN NOTE
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  He also has a mediastinal shift.  When compared to his initial CT from November 2022, he had a very similar obstruction of the bronchus intermedius.  At this time his endobronchial obstruction appears to be very similar however he does have increasing pleural effusion which may be in the setting of compressive atelectasis.    He did have evidence of endobronchial disease during his bronchoscopy in February 2023.    Given that he is minimally symptomatic at this time, stenting the bronchus intermedius is unlikely to be of any additional benefit.  He is yet to complete his radiation treatment at this time and was most recently on systemic treatment for his lung cancer.  His mild dyspnea may be improved by helping with his pleural effusion (which may benefit by placement of PleurX catheter).    Recommendations   Continue to monitor hemoglobin/hematocrit and coagulation studies to ensure stabilization  We will plan to perform airway exam with bronchoalveolar lavage (for concern regarding postobstructive pneumonia) under general anesthesia on 2/7/24   Under the same general anesthesia, we will  consider placement of a PleurX catheter in the right pleural space

## 2024-02-05 NOTE — CONSULTS
[facility]  Pulmonology  Consult Note    Patient Name: Steven Campo  MRN: [id number]  Admission Date: 2/3/2024  Hospital Length of Stay: 2 days  Code Status: DNR  Attending Physician: Rafael Cruz MD  Primary Care Provider: Dawn Dumont FNP   Principal Problem: Generalized weakness    Inpatient consult to Pulmonology  Consult performed by: Gonzalo Robert MD  Consult ordered by: Rafael Cruz MD        Subjective:        Consult note completed.  Refer to pulmonary consultation documentation.

## 2024-02-05 NOTE — PROGRESS NOTES
"[facility] Hospital Medicine  Progress Note    Patient Name: Steven Campo  MRN: [id number]  Patient Class: IP- Inpatient   Admission Date: 2/3/2024  Length of Stay: 2 days  Attending Physician: Rafael Cruz MD  Primary Care Provider: Dawn Dumont FNP        Subjective:     Principal Problem:Generalized weakness        HPI:  Mr. Campo is a 68 Y O male with PMH of Lung cancer (stage 4, with mets to spine) s/p chemo, currently undergoing radiation, HTN, history of CVA with residual right hemiplegia, BPH, tobacco dependence, who presented to ED via EMS with fall and generalized weakness. Patient reports since last 2 days he has gotten progressively weak where his legs will "give out" whenever he tries to walk. He slid down to the floor from the couch last night, unable to get up. Sister called EMS. Patient reports productive cough with brownish sputum and some exertional shortness of breath. No reports of fever, chills, ill contacts. Patient denies chest pain, leg swelling, numbness in lower extremities, bowel or bladder incontinence. He denies head trauma, seizure like activity or LOC. He has generally gotten weaker since his diagnosis of lung cancer exactly a year ago and especially since completing chemotherapy 3 weeks prior to admission. He was started on radiation therapy for his mets to spine, and he has undergone 4 out of 6 sessions. He follows with Dr. Vazquez for his lung cancer.     Overview/Hospital Course:  2/4: Pulmonology evaluated, recommend no thoracentesis until right bronchus obstruction is relieved as the lung is not likely to expand. Will run it by interventional Pulm for options.     Interval History: Patient seen and examined at the bedside, doing ok, encouraged him to work with PT/OT.     Review of Systems   Constitutional:  Positive for fatigue.   Respiratory:  Positive for cough.    Neurological:  Positive for weakness.     Objective:     Vital Signs (Most " Recent):  Temp: 99 °F (37.2 °C) (02/05/24 1220)  Pulse: 106 (02/05/24 1220)  Resp: 14 (02/05/24 1220)  BP: 112/77 (02/05/24 1220)  SpO2: 97 % (02/05/24 1220) Vital Signs (24h Range):  Temp:  [98 °F (36.7 °C)-99.5 °F (37.5 °C)] 99 °F (37.2 °C)  Pulse:  [] 106  Resp:  [13-20] 14  SpO2:  [94 %-100 %] 97 %  BP: ()/(68-84) 112/77     Weight: 62.4 kg (137 lb 9.1 oz)  Body mass index is 19.19 kg/m².    Intake/Output Summary (Last 24 hours) at 2/5/2024 1331  Last data filed at 2/5/2024 1219  Gross per 24 hour   Intake 250 ml   Output 750 ml   Net -500 ml         Physical Exam  Vitals and nursing note reviewed.   Constitutional:       Comments: Frail appearing     HENT:      Head: Normocephalic and atraumatic.      Nose: Nose normal.      Mouth/Throat:      Mouth: Mucous membranes are moist.   Eyes:      Extraocular Movements: Extraocular movements intact.   Cardiovascular:      Rate and Rhythm: Regular rhythm. Tachycardia present.      Pulses: Normal pulses.      Heart sounds: Normal heart sounds.   Pulmonary:      Effort: Pulmonary effort is normal.      Breath sounds: Examination of the right-upper field reveals decreased breath sounds. Examination of the right-middle field reveals decreased breath sounds. Examination of the right-lower field reveals decreased breath sounds. Decreased breath sounds present.   Abdominal:      General: Bowel sounds are normal.      Palpations: Abdomen is soft.   Musculoskeletal:         General: Normal range of motion.      Cervical back: Normal range of motion.   Skin:     General: Skin is warm.   Neurological:      General: No focal deficit present.      Mental Status: He is alert and oriented to person, place, and time.      Motor: Weakness present.   Psychiatric:         Mood and Affect: Mood normal.         Behavior: Behavior normal.             Significant Labs: All pertinent labs within the past 24 hours have been reviewed.    Significant Imaging: I have reviewed all  pertinent imaging results/findings within the past 24 hours.    Assessment/Plan:      * Generalized weakness  Presented with fall onto bottom, inability to ambulate x 2 days PTA.   Suspect overall debility due to malignancy, chemotherapy.   Known right sided weakness, no red flags on clinical exam.  CT T/L spine with metastatic disease and compression fracture respectively.   PT/OT consulted, possibly to require swing bed placement.       Sepsis due to pneumonia  This patient does have evidence of infective focus  My overall impression is sepsis.  Source: Respiratory  Antibiotics given-   Antibiotics (72h ago, onward)      Start     Stop Route Frequency Ordered    02/04/24 1600  cefTRIAXone (ROCEPHIN) 2 g in dextrose 5 % in water (D5W) 100 mL IVPB (MB+)         02/10/24 1559 IV Every 24 hours (non-standard times) 02/03/24 1606    02/04/24 1600  azithromycin (ZITHROMAX) 500 mg in dextrose 5 % (D5W) 250 mL IVPB         -- IV Every 24 hours (non-standard times) 02/03/24 1606          Latest lactate reviewed-  Recent Labs   Lab 02/03/24 2042   LACTATE 2.0       Fluid challenge Actual Body weight- Patient will receive 30ml/kg actual body weight to calculate fluid bolus for treatment of septic shock.     Post- resuscitation assessment No - Post resuscitation assessment not needed       Will Not start Pressors- Levophed for MAP of 65  Oxygenation stable on room air.   Source control achieved by: antibiotics (ceftriaxone and azithromycin)- covering broader given recent chemo and underlying lung cancer possibly with post obstruction.   Vancomycin discontinued given MRSA PCR negative.     Pleural effusion  Patient found to have large pleural effusion on imaging.   I have personally reviewed and interpreted the following imaging: Xray and CT chest.   Most likely etiology includes metastatic lung cancer.   Discussed with Pulm- Dr. Nugent, hold on thoracentesis since there is right mainstem bronchus obstruction from the tumor  and the lung is not likely to expand after thoracentesis, and patient is stable from respiratory stand point.  Discussed with interventional Pulmonologist- Dr. Reeves, plan for airway exam/BAL and PleurX catheter under general anesthesia on 2/7.     Malignant neoplasm of right lung  In 11/2022 patient had CXR concerning for right hilar mass, CT chest confirmed the mass with lymph nodes suggestive of malignancy.   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   CT cervical spine consistent with C2 metastatic lesion.   S/p chemotherapy completion 3 weeks ago and currently receiving radiation therapy for spinal mets (4 out of 6 sessions completed).   Follows with Dr. Vazquez.       Closed compression fracture of L1 vertebra  CT L-spine showed acute pathologic L1 compression fracture with mild retropulsion, no spinal stenosis reported.  Checking MRI lumbar spine to ensure no cord involvement, clinically patient does not exhibit signs of cord compression.  May require consultation with ortho-spine team.       Tobacco dependence  Dangers of cigarette smoking were reviewed with patient in detail. Patient was Referred to Tobacco Cessation Program. Nicotine replacement options were discussed. Nicotine replacement was discussed- prescribed    DNR (do not resuscitate)  Discussed code status on admission, in the presence of ED RN.   Confirms DNR and do not intubate.   Prognosis is guarded given multiple mets/poorly differentiated lung cancer, consider discussing hospice.       Normocytic anemia  Patient's anemia is currently controlled. Received 1 unit PRBC on 2/5. Etiology likely d/t chronic disease due to Malignancy and chemotherapy.  Baseline Hgb prior to being diagnosed with malignancy was ~ 12.   Admission Hgb 8.6.   Current CBC reviewed-   Lab Results   Component Value Date    HGB 6.7 (L) 02/05/2024    HCT 21.8 (L) 02/05/2024     B12/folate  WNL, LDH/haptoglobin NOT suggestive of hemolysis, iron panel consistent with anemia of chronic disease.   Stool for occult blood is positive, no active bleeding noted.  Underwent colonoscopy (screening) in 2022 but prep was poor and patient was supposed to repeat colonoscopy in 1 year but has not gotten a chance yet.  Hgb dropped to 6.7 on 2/5, GI consulted to consider endoscopic evaluation.  Hold home aspirin and Lovenox for VTE prophylaxis.   Monitor serial CBC and transfuse if patient becomes hemodynamically unstable, symptomatic or H/H drops below 7/21.    Benign prostatic hyperplasia with urinary frequency  Resume home finasteride.       Mass of right lung  Plan as outlined above.       Stage 3b chronic kidney disease  Creatine stable for now. BMP reviewed- noted Estimated Creatinine Clearance: 81.2 mL/min (based on SCr of 0.79 mg/dL). according to latest data. Based on current GFR, CKD stage is stage 3 - GFR 30-59.  Monitor UOP and serial BMP and adjust therapy as needed. Renally dose meds. Avoid nephrotoxic medications and procedures.    Essential (primary) hypertension  Chronic, controlled. Latest blood pressure and vitals reviewed-     Temp:  [98 °F (36.7 °C)-99.5 °F (37.5 °C)]   Pulse:  []   Resp:  [13-20]   BP: ()/(68-84)   SpO2:  [94 %-100 %] .   Home meds for hypertension were reviewed and noted below.   Hypertension Medications               amLODIPine (NORVASC) 5 MG tablet Take 1 tablet (5 mg total) by mouth once daily.            While in the hospital, will manage blood pressure as follows; Continue home antihypertensive regimen    Will utilize p.r.n. blood pressure medication only if patient's blood pressure greater than 180/110 and he develops symptoms such as worsening chest pain or shortness of breath.    History of stroke  Residual right sided weakness.   Follows with Dr. Sanchez.   Resume home statin.   Holding aspirin given concern for GI bleed.         VTE Risk Mitigation (From  admission, onward)           Ordered     IP VTE HIGH RISK PATIENT  Once         02/03/24 1606     Place sequential compression device  Until discontinued         02/03/24 1606                    Discharge Planning   HEATHER: 2/8/2024     Code Status: DNR   Is the patient medically ready for discharge?:     Reason for patient still in hospital (select all that apply): Laboratory test, Treatment, and Consult recommendations  Discharge Plan A: Skilled Nursing Facility              PRESTON PADILLA MD  Department of Hospital Medicine   [facility]

## 2024-02-05 NOTE — ASSESSMENT & PLAN NOTE
This patient does have evidence of infective focus  My overall impression is sepsis.  Source: Respiratory  Antibiotics given-   Antibiotics (72h ago, onward)      Start     Stop Route Frequency Ordered    02/04/24 1600  cefTRIAXone (ROCEPHIN) 2 g in dextrose 5 % in water (D5W) 100 mL IVPB (MB+)         02/10/24 1559 IV Every 24 hours (non-standard times) 02/03/24 1606    02/04/24 1600  azithromycin (ZITHROMAX) 500 mg in dextrose 5 % (D5W) 250 mL IVPB         -- IV Every 24 hours (non-standard times) 02/03/24 1606          Latest lactate reviewed-  Recent Labs   Lab 02/03/24 2042   LACTATE 2.0       Fluid challenge Actual Body weight- Patient will receive 30ml/kg actual body weight to calculate fluid bolus for treatment of septic shock.     Post- resuscitation assessment No - Post resuscitation assessment not needed       Will Not start Pressors- Levophed for MAP of 65  Oxygenation stable on room air.   Source control achieved by: antibiotics (ceftriaxone and azithromycin)- covering broader given recent chemo and underlying lung cancer possibly with post obstruction.   Vancomycin discontinued given MRSA PCR negative.

## 2024-02-05 NOTE — ASSESSMENT & PLAN NOTE
Residual right sided weakness.   Follows with Dr. Sanchez.   Resume home statin.   Holding aspirin given concern for GI bleed.

## 2024-02-05 NOTE — RESPIRATORY THERAPY
Treatment given by RT student Annette Andrew.     02/05/24 8768   Patient Assessment/Suction   Level of Consciousness (AVPU) alert   Respiratory Effort Unlabored   Expansion/Accessory Muscles/Retractions no use of accessory muscles;no retractions   All Lung Fields Breath Sounds Anterior:;Lateral:;equal bilaterally;clear   Rhythm/Pattern, Respiratory unlabored;pattern regular;depth regular;no shortness of breath reported   PRE-TX-O2   Device (Oxygen Therapy) room air   SpO2 96 %   Pulse Oximetry Type Intermittent   $ Pulse Oximetry - Multiple Charge Pulse Oximetry - Multiple   Pulse 94   Resp 13   Positioning   Body Position position maintained   Head of Bed (HOB) Positioning HOB elevated   Aerosol Therapy   $ Aerosol Therapy Charges Aerosol Treatment   Daily Review of Necessity (SVN) completed   Respiratory Treatment Status (SVN) given   Treatment Route (SVN) oxygen;mask   Patient Position (SVN) HOB elevated   Post Treatment Assessment (SVN) breath sounds unchanged   Signs of Intolerance (SVN) none   Breath Sounds Post-Respiratory Treatment   Throughout All Fields Post-Treatment All Fields   Throughout All Fields Post-Treatment Anterior:;Lateral:;no change   Post-treatment Heart Rate (beats/min) 93   Post-treatment Resp Rate (breaths/min) 14   Respiratory Evaluation   $ Care Plan Tech Time 15 min

## 2024-02-05 NOTE — PROGRESS NOTES
[facility]  Adult Nutrition  First Assessment Note         Reason for Assessment  Reason For Assessment: identified at risk by screening criteria (MST3)  Nutrition Risk Screen: no indicators present    Assessment and Plan  Patient is a 69yo male admitted 2/3 for generalized weakness. He was identified at risk by screening criteria with MST3.     Patient is 137 pounds with a BMI of 19.19 and is underweight per geriatric standards. Unsure of accuracy of weight as he is documented as 130 pounds using a standard scale while in the ER, current weight obtained via bed scale. Recommend obtain reweight.     Altered skin integrity notes to buttocks. Unsure of severity. May need to add Pedro BID to aid in wound healing. Will monitor make additional recommendations as appropriate.     Patient is s/p chemo and receiving radiation for malignant neoplasm of right lung. He endorsed 2-13 pound weight loss on admission with poor PO intakes. Weight history does not reveal significant weight changes, however patient is frail appearing with noted fat and muscle loss. See full physical findings below.     Per ASPEN guidelines, patient will meet criteria for moderate protein-calorie malnutrition.     Patient was previously on a Regular diet. No intake documented per flowsheets. He is currently NPO pending possible thoracentesis. Recommend resume Regular diet as soon as medically appropriate and tolerated. Encourage good PO intakes. If poor PO intakes, recommend addition of Boost Plus TID to improve kcal/protein intakes to better meet nutritional needs. Estimated nutritional needs calculated 30-35kcal/kg and 1.2-15 protein/kg.     Last BM 2/4 per flowsheet.     Medications/labs reviewed. RD following.          Learning Needs/Social Determinants of Health  Learning Assessment       02/04/2024 1706 [facility] (2/3/2024 - Present)   Created by Jaswinder Roberts, RN - RN (Nurse) Status: Complete                  PRIMARY LEARNER     Primary Learner Name:  Steven Campo JA - 02/04/2024 1706    Relationship:  Patient JA - 02/04/2024 1706    Does the primary learner have any barriers to learning?:  No Barriers JA - 02/04/2024 1706    What is the preferred language of the primary learner?:  English JA - 02/04/2024 1706    Is an  required?:  No JA - 02/04/2024 1706    How does the primary learner prefer to learn new concepts?:  Listening JA - 02/04/2024 1706    How often do you need to have someone help you read instructions, pamphlets, or written material from your doctor or pharmacy?:  Rarely JA - 02/04/2024 1706        CO-LEARNER #1     No question answered        CO-LEARNER #2     No question answered        SPECIAL TOPICS     No question answered        ANSWERED BY:     No question answered        Edit History       Jaswinder Roberts RN - RN (Nurse)   02/04/2024 1706                           Social Determinants of Health     Tobacco Use: High Risk (11/30/2023)    Patient History     Smoking Tobacco Use: Every Day     Smokeless Tobacco Use: Never     Passive Exposure: Current   Alcohol Use: Heavy Drinker (2/5/2024)    AUDIT-C     Frequency of Alcohol Consumption: 2-3 times a week     Average Number of Drinks: 3 or 4     Frequency of Binge Drinking: Never   Financial Resource Strain: Low Risk  (2/5/2024)    Overall Financial Resource Strain (CARDIA)     Difficulty of Paying Living Expenses: Not hard at all   Food Insecurity: No Food Insecurity (2/5/2024)    Hunger Vital Sign     Worried About Running Out of Food in the Last Year: Never true     Ran Out of Food in the Last Year: Never true   Transportation Needs: No Transportation Needs (2/5/2024)    PRAPARE - Transportation     Lack of Transportation (Medical): No     Lack of Transportation (Non-Medical): No   Physical Activity: Inactive (2/5/2024)    Exercise Vital Sign     Days of Exercise per Week: 0 days     Minutes of Exercise per Session: 0 min    Stress: No Stress Concern Present (2/5/2024)    [employer] [place] [employer] - Occupational Stress Questionnaire     Feeling of Stress : Not at all   Social Connections: Moderately Isolated (2/5/2024)    Social Connection and Isolation Panel [NHANES]     Frequency of Communication with Friends and Family: Three times a week     Frequency of Social Gatherings with Friends and Family: Three times a week     Attends Confucianist Services: 1 to 4 times per year     Active Member of Clubs or Organizations: No     Attends Club or Organization Meetings: Never     Marital Status:    Housing Stability: Low Risk  (2/5/2024)    Housing Stability Vital Sign     Unable to Pay for Housing in the Last Year: No     Number of Places Lived in the Last Year: 1     Unstable Housing in the Last Year: No   Depression: Low Risk  (11/30/2023)    Depression     Last PHQ-4: Flowsheet Data: 0           Malnutrition      Is Patient Malnourished: Yes Malnutrition Assessment  Malnutrition Context: chronic illness  Malnutrition Level: moderate          Energy Intake (Malnutrition): less than 75% for greater than or equal to 1 month   Orbital Region (Subcutaneous Fat Loss): moderate depletion  Upper Arm Region (Subcutaneous Fat Loss): moderate depletion   [place] Region (Muscle Loss): moderate depletion  Clavicle Bone Region (Muscle Loss): moderate depletion                 Skin Integrity  Anam Risk Assessment  Sensory Perception: 3-->slightly limited  Moisture: 3-->occasionally moist  Activity: 3-->walks occasionally  Mobility: 2-->very limited  Nutrition: 3-->adequate  Friction and Shear: 3-->no apparent problem  Anam Score: 17      Nutrition Diagnosis  Increased Protein Needs related to Catabolic illness and Wound healing as evidenced by cancer diagnosis, noted altered skin integrity to buttocks  Comments: Resume Regular diet as tolerated. Encourage good PO intakes. If poor PO intakes, offer Boost Plus TID        Recent  Labs   Lab 02/05/24  0402   *     Comments on Glucose: Glucose elevated. No PMH DM. Likely related to stress response to pneumonia    Nutrition Prescription / Recommendations  Recommendation/Intervention: Recommend resume Regular diet as tolerated. Encourage good PO intakes. If poor PO intakes, recommend addition of Boost Plus TID to improve kcal/protein intakes.  Goals: Weight maintenance during admission, intake 50-75% of meals during admission  Nutrition Goal Status: new  Current Diet Order: NPO  Chewing or Swallowing Difficulty?: No Chewing or swallowing difficulty  Recommended Diet: Regular  Recommended Oral Supplement: Pedro [90 kcals, 2.5g Protein, 10g Carbs(3g Sugar), 7g L-Arginine, 7g L-Glutamine, Vitamin C 300mg, 9.5mg Zinc] 2 times a day and Boost Plus [360kcals, 14g Protein, 45g Carbs] 3 times a day  Is Nutrition Support Recommended: [facility] Support: No  Is Nutrition Education Recommended: No    Monitor and Evaluation  % current Intake: NPO  % intake to meet estimated needs: P.O. + Supplements  Food and Nutrient Intake: food and beverage intake  Food and Nutrient Adminstration: diet order  Anthropometric Measurements: weight change, weight  Biochemical Data, Medical Tests and Procedures: electrolyte and renal panel, gastrointestinal profile, glucose/endocrine profile, inflammatory profile, lipid profile       Current Medical Diagnosis and Past Medical History  Diagnosis: other (see comments)  Past Medical History:   Diagnosis Date    Adenocarcinoma of lung     Dx 2/22/23    Chronic bilateral low back pain with right-sided sciatica     Chronic pain of right knee     Essential (primary) hypertension     Hypercholesteremia     Nicotine dependence, cigarettes, uncomplicated     Seizures 2020    last was approx 7 mths ago    Stroke 2015    with residual right sided weakness       Nutrition/Diet History       Lab/Procedures/Meds  Recent Labs   Lab 02/05/24  0402   *   K 3.8   BUN  "19*   CREATININE 0.79   CALCIUM 8.7   ALBUMIN 1.6*      ALT 14*   AST 45*   Note: Na+ low. Recommend consider replete to WNL as appropriate. Alb low, likely related to poor PO intakes and inflammatory response to cancer and pneumonia. AST elevated, ALT low.     Last A1c: No results found for: "HGBA1C"  Lab Results   Component Value Date    RBC 2.25 (L) 02/05/2024    HGB 6.7 (L) 02/05/2024    HCT 21.8 (L) 02/05/2024    MCV 96.9 (H) 02/05/2024    MCH 29.8 02/05/2024    MCHC 30.7 (L) 02/05/2024    TIBC 87 (L) 02/03/2024   Note: H&H low  Pertinent Labs Reviewed: reviewed  Pertinent Medications Reviewed: reviewed  Scheduled Meds:   amLODIPine  5 mg Oral Daily    atorvastatin  40 mg Oral Daily    azithromycin  500 mg Intravenous Q24H    cefTRIAXone (Rocephin) IV (PEDS and ADULTS)  2 g Intravenous Q24H    finasteride  5 mg Oral Daily    levalbuterol  1.25 mg Nebulization Q8H    nicotine  1 patch Transdermal Daily    tuberculin  5 Units Intradermal Once     Continuous Infusions:  PRN Meds:.0.9%  NaCl infusion (for blood administration), acetaminophen, albuterol-ipratropium, aluminum-magnesium hydroxide-simethicone, HYDROcodone-acetaminophen, naloxone, ondansetron, sodium chloride 0.9%, trazodone      Anthropometrics  Temp: 99.2 °F (37.3 °C)  Height Method: Stated  Height: 5' 11" (180.3 cm)  Height (inches): 71 in  Weight Method: Bed Scale  Weight: 62.4 kg (137 lb 9.1 oz)  Weight (lb): 137.57 lb  Ideal Body Weight (IBW), Male: 172 lb  % Ideal Body Weight, Male (lb): 75.58 %  BMI (Calculated): 19.2       Estimated/Assessed Needs  RMR ([place]-St. Jeor Equation): 1426.13     Temp: 99.2 °F (37.3 °C)Oral  Weight Used For Calorie Calculations: 62.4 kg (137 lb 9.1 oz)     Energy Calorie Requirements (kcal): 1872-2184kcal (30-35kcal/kg)  Weight Used For Protein Calculations: 62.4 kg (137 lb 9.1 oz)  Protein Requirements: 75-94g (1.2-1.5g/kg)       RDA Method (mL): 1872       Nutrition by Nursing              Last Bowel " Movement: 02/04/24                Nutrition Follow-Up  RD Follow-up?: Yes      Nutrition Discharge Planning: Admitted from home. Possible discharge to [facility] when medically ready. Will benefit from liberalized diet as tolerated on discharge. Will monitor and assess for educational needs closer to discharge.          Connie Carrillo MS, RD, LD  Available via Secure Chat

## 2024-02-05 NOTE — PT/OT/SLP EVAL
Occupational Therapy   Evaluation    Name: Steven Campo  MRN: [id number]  Admitting Diagnosis: Generalized weakness  Recent Surgery: * No surgery found *      Recommendations:     Discharge Recommendations: Moderate Intensity Therapy  Discharge Equipment Recommendations:   (to be determined)  Barriers to discharge:  None    Assessment:     Steven Campo is a 68 y.o. male with a medical diagnosis of Generalized weakness.  He presents with complaint of back pain. Pt agreed to OT evaluation. Performance deficits affecting function: weakness, impaired endurance, impaired self care skills, impaired functional mobility, impaired balance, decreased upper extremity function, decreased lower extremity function, pain.      Rehab Prognosis: Good and Fair; patient would benefit from acute skilled OT services to address these deficits and reach maximum level of function.       Plan:     Patient to be seen 5 x/week to address the above listed problems via self-care/home management, therapeutic activities, therapeutic exercises  Plan of Care Expires:    Plan of Care Reviewed with: patient, sibling    Subjective     Chief Complaint: Back pain  Patient/Family Comments[facility]goals: [facility]    Occupational Profile:  Living Environment: Pt lives at home alone in 1 story home with a ramp  Previous level of function: Pt reports being (I) with self care prior  Roles and Routines: I with daily activities  Equipment Used at Home: grab bar, shower chair, wheelchair  Assistance upon Discharge: Staff at facility    Pain/Comfort:  Pain Rating 1: 7/10  Location 1: back  Pain Addressed 1: Reposition, Distraction  Pain Rating Post-Intervention 1: 7/10    Patients cultural, spiritual, Temple conflicts given the current situation: no    Objective:     Communicated with: JOANN Pickett prior to session.  Patient found HOB elevated with peripheral IV, SCD upon OT entry to room.    General Precautions: Standard, fall  Orthopedic Precautions:  N/A  Braces: N/A  Respiratory Status: Room air    Occupational Performance:    Bed Mobility:    Patient completed Rolling/Turning to Left with  minimum assistance  Patient completed Rolling/Turning to Right with minimum assistance  Patient completed Supine to Sit with minimum assistance  Patient completed Sit to Supine with minimum assistance    Functional Mobility/Transfers:  Patient completed Sit <> Stand Transfer with minimum assistance and of 2 persons  with gait belt to stand to  rolling walker   Patient completed Bed <> Chair Transfer using Step Transfer technique with minimum assistance and of 2 persons with rolling walker  Functional Mobility: Min a x 2 with RW    Activities of Daily Living:  Upper Body Dressing: minimum assistance donning gown, max a donning gown as a robe  Lower Body Dressing: dependence donning socks    Cognitive/Visual Perceptual:  Cognitive/Psychosocial Skills:     -       Oriented to: Person, Place, and Situation   -       Follows Commands/attention:Follows one-step commands  -       Communication: clear/fluent  Visual/Perceptual:      -wears glasses. Hearing wfl      Physical Exam:  Balance:    -       SBA/CGA with EOB sitting  Skin integrity: Visible skin intact  Edema:  None noted  Sensation:    -       Intact  Motor Planning:    -       wfl  Dominant hand:    -       Right  Upper Extremity Range of Motion:     -       Right Upper Extremity: shoulder and hand limited, otherwise wfl  -       Left Upper Extremity: WFL  Upper Extremity Strength:    -       Right Upper Extremity:  shoulder -3/5 otherwise +3/5  -       Left Upper Extremity: WFL   Strength:    -       Right Upper Extremity:  limited due to contractures  -       Left Upper Extremity: WFL    AMPAC 6 Click ADL:  AMPAC Total Score: 16    Treatment & Education:  OT evaluation completed. See eval for details. Pt presents with decline in status with self care and mobility. Tx plan is to incr (I)  Pt educated on OT role/POC.    Importance of OOB activity.  Importance of sitting up in the chair.  Safety during functional t/f and mobility with use of RW  Importance of assisting with self-care activities   All questions/concerns answered within OT scope of practice     Patient left up in chair with all lines intact, call button in reach, and nurse and sister present    GOALS:   Multidisciplinary Problems       Occupational Therapy Goals          Problem: Occupational Therapy    Goal Priority Disciplines Outcome Interventions   Occupational Therapy Goal     OT, PT/OT Ongoing, Progressing    Description: STG:  Pt will be (I) with grooming  Pt will bathe with min a  Pt will perform UE dressing with (I)  Pt will perform LE dressing with I/Mod I  Pt will sit EOB x 10 min (I)  Pt will transfer bed/chair/bsc with CGA with RW  Pt will perform standing task x 2 min with CGA   Pt will tolerate 15 minutes of tx without fatigue      LT.Restore to max I with self care and mobility.                          History:     Past Medical History:   Diagnosis Date    Adenocarcinoma of lung     Dx 23    Chronic bilateral low back pain with right-sided sciatica     Chronic pain of right knee     Essential (primary) hypertension     Hypercholesteremia     Nicotine dependence, cigarettes, uncomplicated     Seizures     last was approx 7 mths ago    Stroke     with residual right sided weakness         Past Surgical History:   Procedure Laterality Date    KNEE ARTHROSCOPY Right        Time Tracking:     OT Date of Treatment: 24  OT Start Time: 1004  OT Stop Time: 1032  OT Total Time (min): 28 min    Billable Minutes:Evaluation 28    2024

## 2024-02-05 NOTE — ASSESSMENT & PLAN NOTE
Patient found to have large pleural effusion on imaging.   I have personally reviewed and interpreted the following imaging: Xray and CT chest.   Most likely etiology includes metastatic lung cancer.   Discussed with Pulm- Dr. Nugent, hold on thoracentesis since there is right mainstem bronchus obstruction from the tumor and the lung is not likely to expand after thoracentesis, and patient is stable from respiratory stand point.  Discussed with interventional Pulmonologist- Dr. Reeves, plan for airway exam/BAL and PleurX catheter under general anesthesia on 2/7.

## 2024-02-05 NOTE — PLAN OF CARE
[facility]  Initial Discharge Assessment       Primary Care Provider: Dawn Dumont FNP    Admission Diagnosis: Dehydration [E86.0]  Leukocytosis [D72.829]  Tobacco dependence [F17.200]  Pleural effusion [J90]  Tachycardia [R00.0]  DNR (do not resuscitate) [Z66]  Generalized weakness [R53.1]  Chest pain [R07.9]  Malignant neoplasm of hilus of right lung [C34.01]  Sepsis due to pneumonia [J18.9, A41.9]  Pneumonia due to infectious organism, unspecified laterality, unspecified part of lung [J18.9]  Closed compression fracture of L1 vertebra, initial encounter [S32.010A]    Admission Date: 2/3/2024  Expected Discharge Date: 2/7/2024    Transition of Care Barriers: None    Payor: MEDICARE / Plan: MEDICARE PART A & B / Product Type: Government /     Extended Emergency Contact Information  Primary Emergency Contact: Chandrika Rodriguez  [place] Phone: [phone]  Mobile Phone: [phone]  Relation: Sister  Preferred language: English   needed? No    Discharge Plan A: Skilled Nursing Facility  Discharge Plan B: Skilled Nursing Facility      [id number] - [place], [place] - [address]  [address]  [place] [address]  Phone: [phone] Fax: [phone]    [facility] - [place], [place] - [address] [place] [address]  Phone: [phone] Fax: [phone]      Initial Assessment (most recent)       Adult Discharge Assessment - 02/05/24 1027          Discharge Assessment    Assessment Type Discharge Planning Assessment     Source of Information patient     Communicated HEATHER with patient/caregiver Date not available/Unable to determine     People in Home alone     Facility Arrived From: Home     Do you expect to return to your current living situation? No     Do you have help at home or someone to help you manage your care at home? No     Prior to hospitilization cognitive status: Alert/Oriented     Current cognitive status:  Alert/Oriented     Walking or Climbing Stairs Difficulty no     Dressing/Bathing Difficulty no     Home Accessibility wheelchair accessible   has a ramp    Home Layout Able to live on 1st floor     Equipment Currently Used at Home none     Patient currently being followed by outpatient case management? No     Do you currently have service(s) that help you manage your care at home? No     Do you take prescription medications? Yes     Do you have prescription coverage? Yes     Coverage [facility]/ [facility]     Do you have any problems affording any of your prescribed medications? No     Is the patient taking medications as prescribed? yes     Who is going to help you get home at discharge? sister     How do you get to doctors appointments? family or friend will provide     Are you on dialysis? No     Do you take coumadin? No     Discharge Plan A Skilled Nursing Facility     Discharge Plan B Skilled Nursing Facility     DME Needed Upon Discharge  --   unsure    Discharge Plan discussed with: Patient     Transition of Care Barriers None        Physical Activity    On average, how many days per week do you engage in moderate to strenuous exercise (like a brisk walk)? 0 days     On average, how many minutes do you engage in exercise at this level? 0 min        Financial Resource Strain    How hard is it for you to pay for the very basics like food, housing, medical care, and heating? Not hard at all        Housing Stability    In the last 12 months, was there a time when you were not able to pay the mortgage or rent on time? No     In the last 12 months, how many places have you lived? 1     In the last 12 months, was there a time when you did not have a steady place to sleep or slept in a shelter (including now)? No        Transportation Needs    In the past 12 months, has lack of transportation kept you from medical appointments or from getting medications? No     In the past 12 months, has lack of transportation kept you from  meetings, work, or from getting things needed for daily living? No        Food Insecurity    Within the past 12 months, you worried that your food would run out before you got the money to buy more. Never true     Within the past 12 months, the food you bought just didn't last and you didn't have money to get more. Never true        Stress    Do you feel stress - tense, restless, nervous, or anxious, or unable to sleep at night because your mind is troubled all the time - these days? Not at all        Social Connections    In a typical week, how many times do you talk on the phone with family, friends, or neighbors? Three times a week     How often do you get together with friends or relatives? Three times a week     How often do you attend Spiritism or Taoism services? 1 to 4 times per year     Do you belong to any clubs or organizations such as Spiritism groups, unions, fraternal or athletic groups, or school groups? No     How often do you attend meetings of the clubs or organizations you belong to? Never     Are you , , , , never , or living with a partner?         Alcohol Use    Q1: How often do you have a drink containing alcohol? 2-3 times a week     Q2: How many drinks containing alcohol do you have on a typical day when you are drinking? 3 or 4     Q3: How often do you have six or more drinks on one occasion? Never                        SS was consulted on pt for b at discharge. SS spoke with pt in the room. Pt lives at home alone. Not current with home health and no dme pta. Pt does wish to go to [facility] at discharge. [facility]ed [facility]. SS will make referral to [facility] and called to notify Jessica. Choice and IM obtained.

## 2024-02-06 NOTE — PROGRESS NOTES
"[facility] Hospital Medicine  Progress Note    Patient Name: Steven Campo  MRN: [id number]  Patient Class: IP- Inpatient   Admission Date: 2/3/2024  Length of Stay: 3 days  Attending Physician: Rafael Cruz MD  Primary Care Provider: Dawn Dumont FNP        Subjective:     Principal Problem:Generalized weakness        HPI:  Mr. Campo is a 68 Y O male with PMH of Lung cancer (stage 4, with mets to spine) s/p chemo, currently undergoing radiation, HTN, history of CVA with residual right hemiplegia, BPH, tobacco dependence, who presented to ED via EMS with fall and generalized weakness. Patient reports since last 2 days he has gotten progressively weak where his legs will "give out" whenever he tries to walk. He slid down to the floor from the couch last night, unable to get up. Sister called EMS. Patient reports productive cough with brownish sputum and some exertional shortness of breath. No reports of fever, chills, ill contacts. Patient denies chest pain, leg swelling, numbness in lower extremities, bowel or bladder incontinence. He denies head trauma, seizure like activity or LOC. He has generally gotten weaker since his diagnosis of lung cancer exactly a year ago and especially since completing chemotherapy 3 weeks prior to admission. He was started on radiation therapy for his mets to spine, and he has undergone 4 out of 6 sessions. He follows with Dr. Vazquez for his lung cancer.     Overview/Hospital Course:  2/4: Pulmonology evaluated, recommend no thoracentesis until right bronchus obstruction is relieved as the lung is not likely to expand. Will run it by interventional Pulm for options.   2/5: D/W Dr. Robert (interventional Pulm), plan for PleurX catheter and bronch with BAL on 2/7. GI consulted given drop in Hgb- they recommend to monitor Hgb without endoscopic evaluation for now.   2/6: Hgb stable.    Interval History: Patient seen and examined at the bedside, doing ok. "   Review of Systems   Constitutional:  Positive for fatigue.   Respiratory:  Positive for cough.    Neurological:  Positive for weakness.     Objective:     Vital Signs (Most Recent):  Temp: 98.2 °F (36.8 °C) (02/06/24 1016)  Pulse: 110 (02/06/24 1016)  Resp: 18 (02/06/24 1016)  BP: 116/74 (02/06/24 1016)  SpO2: 97 % (02/06/24 1016) Vital Signs (24h Range):  Temp:  [98.2 °F (36.8 °C)-99 °F (37.2 °C)] 98.2 °F (36.8 °C)  Pulse:  [] 110  Resp:  [14-20] 18  SpO2:  [97 %-100 %] 97 %  BP: (109-119)/(68-83) 116/74     Weight: 62.4 kg (137 lb 9.1 oz)  Body mass index is 19.19 kg/m².    Intake/Output Summary (Last 24 hours) at 2/6/2024 1154  Last data filed at 2/6/2024 1125  Gross per 24 hour   Intake 141.67 ml   Output 1050 ml   Net -908.33 ml           Physical Exam  Vitals and nursing note reviewed.   Constitutional:       Comments: Frail appearing     HENT:      Head: Normocephalic and atraumatic.      Nose: Nose normal.      Mouth/Throat:      Mouth: Mucous membranes are moist.   Eyes:      Extraocular Movements: Extraocular movements intact.   Cardiovascular:      Rate and Rhythm: Regular rhythm. Tachycardia present.      Pulses: Normal pulses.      Heart sounds: Normal heart sounds.   Pulmonary:      Effort: Pulmonary effort is normal.      Breath sounds: Examination of the right-upper field reveals decreased breath sounds. Examination of the right-middle field reveals decreased breath sounds. Examination of the right-lower field reveals decreased breath sounds. Decreased breath sounds present.   Abdominal:      General: Bowel sounds are normal.      Palpations: Abdomen is soft.   Musculoskeletal:         General: Normal range of motion.      Cervical back: Normal range of motion.   Skin:     General: Skin is warm.   Neurological:      General: No focal deficit present.      Mental Status: He is alert and oriented to person, place, and time.      Motor: Weakness present.   Psychiatric:         Mood and Affect:  Mood normal.         Behavior: Behavior normal.             Significant Labs: All pertinent labs within the past 24 hours have been reviewed.    Significant Imaging: I have reviewed all pertinent imaging results/findings within the past 24 hours.    Assessment/Plan:      * Generalized weakness  Presented with fall onto bottom, inability to ambulate x 2 days PTA.   Suspect overall debility due to malignancy, chemotherapy.   Known right sided weakness, no red flags on clinical exam.  CT T/L spine with metastatic disease and compression fracture respectively.   PT/OT consulted.  SW assisting with SNF placement.       Sepsis due to pneumonia  This patient does have evidence of infective focus  My overall impression is sepsis.  Source: Respiratory  Antibiotics given-   Antibiotics (72h ago, onward)      Start     Stop Route Frequency Ordered    02/04/24 1600  cefTRIAXone (ROCEPHIN) 2 g in dextrose 5 % in water (D5W) 100 mL IVPB (MB+)         02/10/24 1559 IV Every 24 hours (non-standard times) 02/03/24 1606    02/04/24 1600  azithromycin (ZITHROMAX) 500 mg in dextrose 5 % (D5W) 250 mL IVPB         -- IV Every 24 hours (non-standard times) 02/03/24 1606          Latest lactate reviewed-  Recent Labs   Lab 02/03/24  2042   LACTATE 2.0       Fluid challenge Actual Body weight- Patient will receive 30ml/kg actual body weight to calculate fluid bolus for treatment of septic shock.     Post- resuscitation assessment No - Post resuscitation assessment not needed       Will Not start Pressors- Levophed for MAP of 65  Oxygenation stable on room air.   Source control achieved by: antibiotics (ceftriaxone and azithromycin)- covering broader given recent chemo and underlying lung cancer possibly with post obstruction.   Vancomycin discontinued given MRSA PCR negative.     Pleural effusion  Patient found to have large pleural effusion on imaging.   I have personally reviewed and interpreted the following imaging: Xray and CT chest.    Most likely etiology includes metastatic lung cancer.   Discussed with Pulm- Dr. Nugent, hold on thoracentesis since there is right mainstem bronchus obstruction from the tumor and the lung is not likely to expand after thoracentesis, and patient is stable from respiratory stand point.  Discussed with interventional Pulmonologist- Dr. Reeves, plan for airway exam/BAL and PleurX catheter under general anesthesia on 2/7.     Malignant neoplasm of right lung  In 11/2022 patient had CXR concerning for right hilar mass, CT chest confirmed the mass with lymph nodes suggestive of malignancy.   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   CT cervical spine consistent with C2 metastatic lesion.   S/p chemotherapy completion 3 weeks ago and currently receiving radiation therapy for spinal mets (4 out of 6 sessions completed).   Follows with Dr. Vazquez.       Closed compression fracture of L1 vertebra  CT L-spine showed acute pathologic L1 compression fracture with mild retropulsion, no spinal stenosis reported.  MRI lumbar spine showed 1.1 cm bony fragment or soft tissue located within the spinal canal. Acute severely comminuted compression fracture of L1 associated with bone marrow edema. Furthermore there is 0.7 cm of retropulsion of the superior endplate of L1 contributing to severe spinal canal narrowing.  Ortho-spine (Dr. Ceballos) consulted for opinion on MRI findings.   Pain control.       Tobacco dependence  Dangers of cigarette smoking were reviewed with patient in detail. Patient was Referred to Tobacco Cessation Program. Nicotine replacement options were discussed. Nicotine replacement was discussed- prescribed    DNR (do not resuscitate)  Discussed code status on admission, in the presence of ED RN.   Confirms DNR and do not intubate.   Prognosis is guarded given multiple mets/poorly differentiated lung cancer, consider discussing  hospice.       Normocytic anemia  Patient's anemia is currently controlled. Received 1 unit PRBC on 2/5. Etiology likely d/t chronic disease due to Malignancy and chemotherapy.  Baseline Hgb prior to being diagnosed with malignancy was ~ 12.   Admission Hgb 8.6.   Current CBC reviewed-   Lab Results   Component Value Date    HGB 7.6 (L) 02/06/2024    HCT 25.2 (L) 02/06/2024     B12/folate WNL, LDH/haptoglobin NOT suggestive of hemolysis, iron panel consistent with anemia of chronic disease.   Stool for occult blood is positive, no active bleeding noted.  Underwent colonoscopy (screening) in 2022 but prep was poor and patient was supposed to repeat colonoscopy in 1 year but has not gotten a chance yet.  Hgb dropped to 6.7 on 2/5, GI consulted and recommend to monitor Hgb without endoscopic evaluation, will consider endoscopy if Hgb significantly drops.   Hold home aspirin and Lovenox for VTE prophylaxis for now, If Hgb remain stable x 24 hours then will resume.   Monitor serial CBC and transfuse if patient becomes hemodynamically unstable, symptomatic or H/H drops below 7/21.    Benign prostatic hyperplasia with urinary frequency  Resume home finasteride.       Mass of right lung  Plan as outlined above.       Stage 3b chronic kidney disease  Creatine stable for now. BMP reviewed- noted Estimated Creatinine Clearance: 86.7 mL/min (based on SCr of 0.74 mg/dL). according to latest data. Based on current GFR, CKD stage is stage 3 - GFR 30-59.  Monitor UOP and serial BMP and adjust therapy as needed. Renally dose meds. Avoid nephrotoxic medications and procedures.    Essential (primary) hypertension  Chronic, controlled. Latest blood pressure and vitals reviewed-     Temp:  [98.2 °F (36.8 °C)-99 °F (37.2 °C)]   Pulse:  []   Resp:  [14-20]   BP: (109-119)/(68-83)   SpO2:  [97 %-100 %] .   Home meds for hypertension were reviewed and noted below.   Hypertension Medications               amLODIPine (NORVASC) 5 MG  tablet Take 1 tablet (5 mg total) by mouth once daily.            While in the hospital, will manage blood pressure as follows; Continue home antihypertensive regimen    Will utilize p.r.n. blood pressure medication only if patient's blood pressure greater than 180/110 and he develops symptoms such as worsening chest pain or shortness of breath.    History of stroke  Residual right sided weakness.   Follows with Dr. Sanchez.   Resume home statin.   Holding aspirin given concern for GI bleed.         VTE Risk Mitigation (From admission, onward)           Ordered     IP VTE HIGH RISK PATIENT  Once         02/03/24 1606     Place sequential compression device  Until discontinued         02/03/24 1606                    Discharge Planning   HEATHER: 2/8/2024     Code Status: DNR   Is the patient medically ready for discharge?:     Reason for patient still in hospital (select all that apply): Patient trending condition, Consult recommendations, and Pending disposition  Discharge Plan A: Skilled Nursing Facility            PRESTON PADILLA MD  Department of Hospital Medicine   [facility]

## 2024-02-06 NOTE — PT/OT/SLP PROGRESS
"Physical Therapy      Patient Name:  Steven Campo   MRN:  [id number]    Patient not seen today secondary to Patient unwilling to participate, Pain (pt declined all offers of PT including OOB and/or exercise at bedside. pt stating, "I sholl don't feel like it, I'm sore and hurting".  pt states a pain level of 7/10, "my lowere back is bothering me bad", "this cancer is getting me", "can we do it tomorrow. Will follow-up next treatment date.    PT POC discussed with Frida Martinez DPT     "

## 2024-02-06 NOTE — ASSESSMENT & PLAN NOTE
Presented with fall onto bottom, inability to ambulate x 2 days PTA.   Suspect overall debility due to malignancy, chemotherapy.   Known right sided weakness, no red flags on clinical exam.  CT T/L spine with metastatic disease and compression fracture respectively.   PT/OT consulted.  SW assisting with SNF placement.

## 2024-02-06 NOTE — SUBJECTIVE & OBJECTIVE
Interval History: Patient seen and examined at the bedside, doing ok.   Review of Systems   Constitutional:  Positive for fatigue.   Respiratory:  Positive for cough.    Neurological:  Positive for weakness.     Objective:     Vital Signs (Most Recent):  Temp: 98.2 °F (36.8 °C) (02/06/24 1016)  Pulse: 110 (02/06/24 1016)  Resp: 18 (02/06/24 1016)  BP: 116/74 (02/06/24 1016)  SpO2: 97 % (02/06/24 1016) Vital Signs (24h Range):  Temp:  [98.2 °F (36.8 °C)-99 °F (37.2 °C)] 98.2 °F (36.8 °C)  Pulse:  [] 110  Resp:  [14-20] 18  SpO2:  [97 %-100 %] 97 %  BP: (109-119)/(68-83) 116/74     Weight: 62.4 kg (137 lb 9.1 oz)  Body mass index is 19.19 kg/m².    Intake/Output Summary (Last 24 hours) at 2/6/2024 1154  Last data filed at 2/6/2024 1125  Gross per 24 hour   Intake 141.67 ml   Output 1050 ml   Net -908.33 ml           Physical Exam  Vitals and nursing note reviewed.   Constitutional:       Comments: Frail appearing     HENT:      Head: Normocephalic and atraumatic.      Nose: Nose normal.      Mouth/Throat:      Mouth: Mucous membranes are moist.   Eyes:      Extraocular Movements: Extraocular movements intact.   Cardiovascular:      Rate and Rhythm: Regular rhythm. Tachycardia present.      Pulses: Normal pulses.      Heart sounds: Normal heart sounds.   Pulmonary:      Effort: Pulmonary effort is normal.      Breath sounds: Examination of the right-upper field reveals decreased breath sounds. Examination of the right-middle field reveals decreased breath sounds. Examination of the right-lower field reveals decreased breath sounds. Decreased breath sounds present.   Abdominal:      General: Bowel sounds are normal.      Palpations: Abdomen is soft.   Musculoskeletal:         General: Normal range of motion.      Cervical back: Normal range of motion.   Skin:     General: Skin is warm.   Neurological:      General: No focal deficit present.      Mental Status: He is alert and oriented to person, place, and time.       Motor: Weakness present.   Psychiatric:         Mood and Affect: Mood normal.         Behavior: Behavior normal.             Significant Labs: All pertinent labs within the past 24 hours have been reviewed.    Significant Imaging: I have reviewed all pertinent imaging results/findings within the past 24 hours.

## 2024-02-06 NOTE — ASSESSMENT & PLAN NOTE
Chronic, controlled. Latest blood pressure and vitals reviewed-     Temp:  [98.2 °F (36.8 °C)-99 °F (37.2 °C)]   Pulse:  []   Resp:  [14-20]   BP: (109-119)/(68-83)   SpO2:  [97 %-100 %] .   Home meds for hypertension were reviewed and noted below.   Hypertension Medications               amLODIPine (NORVASC) 5 MG tablet Take 1 tablet (5 mg total) by mouth once daily.            While in the hospital, will manage blood pressure as follows; Continue home antihypertensive regimen    Will utilize p.r.n. blood pressure medication only if patient's blood pressure greater than 180/110 and he develops symptoms such as worsening chest pain or shortness of breath.

## 2024-02-06 NOTE — PROGRESS NOTES
Gastroenterology Consult Note    Chief Complaint: acute on chronic anemia     Consulted by:   Dr. Cruz     HPI:  Steven Campo is a 68 y.o. AAM with MMP including poorly differentiated, metastatic lung cancer that presented from home with weakness and reported fall with incidental anemia now requiring 1u pRBC transfusion. Patient denies NSAIDs, dysphagia, N//V, hematemesis/CGE, abdominal pain, hematochezia/melena. He has no acute complaints on my exam. No prior GIB or EGD. He had a colonoscopy with me in 2022 with fair prep. No [facility] GI related cancer.     INTERVAL  - no acute complaints  - no overt bleeding     Review of Systems   Respiratory:  Positive for shortness of breath.    Gastrointestinal:  Negative for abdominal pain, blood in stool, constipation, diarrhea, heartburn, melena, nausea and vomiting.   Neurological:  Positive for weakness.   All other systems reviewed and are negative.      Past Medical History:   Diagnosis Date    Adenocarcinoma of lung     Dx 2/22/23    Chronic bilateral low back pain with right-sided sciatica     Chronic pain of right knee     Essential (primary) hypertension     Hypercholesteremia     Nicotine dependence, cigarettes, uncomplicated     Seizures 2020    last was approx 7 mths ago    Stroke 2015    with residual right sided weakness     Past Surgical History:   Procedure Laterality Date    KNEE ARTHROSCOPY Right 1995     Family History   Problem Relation Age of Onset    Hypertension Mother     Heart disease Mother         mild MI, later in life    Lung cancer Father     Pancreatic cancer Sister     Hyperlipidemia Sister     Hypertension Sister     Kidney failure Sister     Hypertension Sister     Hyperlipidemia Sister     Hypertension Sister     Hyperlipidemia Sister     Hypertension Sister     Hyperlipidemia Sister     Hypertension Sister     No Known Problems Sister     Heart disease Brother     Hypertension Brother     Hypertension Brother     No Known Problems Daughter  "    No Known Problems Daughter     No Known Problems Maternal Grandmother     No Known Problems Maternal Grandfather     No Known Problems Paternal Grandmother     No Known Problems Paternal Grandfather        OBJECTIVE:  /74   Pulse 110   Temp 98.2 °F (36.8 °C) (Oral)   Resp 18   Ht 5' 11" (1.803 m)   Wt 62.4 kg (137 lb 9.1 oz)   SpO2 97%   BMI 19.19 kg/m²   GEN: chronically ill appearing, cooperative, NAD  HEENT: NCAT, poor dentition, MMM  NECK: Supple, no LAD  CV: tachycardic, regular rhythm  RESP: decreased BS Rt lung, unlabored on RA  ABD: NABS, ND, NT, soft, no guarding  EXT: No clubbing, cyanosis, or edema.  SKIN: Warm and dry  NEURO: AAO x4. Grossly afocal.    LABS:  CMP  Sodium   Date Value Ref Range Status   02/06/2024 135 (L) 136 - 145 mmol/L Final     Potassium   Date Value Ref Range Status   02/06/2024 3.8 3.5 - 5.1 mmol/L Final     Chloride   Date Value Ref Range Status   02/06/2024 102 98 - 107 mmol/L Final     CO2   Date Value Ref Range Status   02/06/2024 27 21 - 32 mmol/L Final     Glucose   Date Value Ref Range Status   02/06/2024 93 74 - 106 mg/dL Final     BUN   Date Value Ref Range Status   02/06/2024 14 7 - 18 mg/dL Final     Creatinine   Date Value Ref Range Status   02/06/2024 0.74 0.70 - 1.30 mg/dL Final     Calcium   Date Value Ref Range Status   02/06/2024 8.9 8.5 - 10.1 mg/dL Final     Total Protein   Date Value Ref Range Status   02/06/2024 6.3 (L) 6.4 - 8.2 g/dL Final     Albumin   Date Value Ref Range Status   02/06/2024 1.6 (L) 3.5 - 5.0 g/dL Final     Bilirubin, Total   Date Value Ref Range Status   02/06/2024 0.2 >0.0 - 1.2 mg/dL Final     Alk Phos   Date Value Ref Range Status   02/06/2024 120 (H) 45 - 115 U/L Final     AST   Date Value Ref Range Status   02/06/2024 40 (H) 15 - 37 U/L Final     ALT   Date Value Ref Range Status   02/06/2024 17 16 - 61 U/L Final     Anion Gap   Date Value Ref Range Status   02/06/2024 10 7 - 16 mmol/L Final     eGFR   Date Value Ref " "Range Status   02/06/2024 100 >=60 mL/min/1.73m2 Final     Recent Results (from the past 336 hour(s))   CBC with Differential    Collection Time: 02/06/24  5:10 AM   Result Value Ref Range    WBC 13.51 (H) 4.50 - 11.00 K/uL    Hemoglobin 7.6 (L) 13.5 - 18.0 g/dL    Hematocrit 25.2 (L) 40.0 - 54.0 %    Platelet Count 324 150 - 400 K/uL   CBC with Differential    Collection Time: 02/05/24  4:02 AM   Result Value Ref Range    WBC 12.55 (H) 4.50 - 11.00 K/uL    Hemoglobin 6.7 (L) 13.5 - 18.0 g/dL    Hematocrit 21.8 (L) 40.0 - 54.0 %    Platelet Count 351 150 - 400 K/uL   CBC with Differential    Collection Time: 02/04/24  4:32 AM   Result Value Ref Range    WBC 16.03 (H) 4.50 - 11.00 K/uL    Hemoglobin 7.5 (L) 13.5 - 18.0 g/dL    Hematocrit 24.4 (L) 40.0 - 54.0 %    Platelet Count 428 (H) 150 - 400 K/uL     No results found for: "INR", "PROTIME"    IMAGING:    CT Chest WO  - Complete opacification of the right hemithorax secondary to right lung atelectasis and right pleural effusion.  There is opacification of the distal most aspect of the right mainstem bronchus.  - Worsening small pulmonary metastases  - Lytic metastatic lesion T8 spinous process as noted on the thoracic spine CT    ASSESSMENT:    68 y.o. AAM with MMP including poorly differentiated, metastatic lung cancer that presented from home with weakness and reported fall with incidental anemia now requiring 1u pRBC transfusion.     PLAN:    Acute on Chronic Anemia  - Hgb 6.7 (7.5), BUN 19 (33), Plt normal, no overt bleeding, appropriate response to 1u pRBC; FOBT+ with hemorrhoids   - anemia panel consistent with AOCDz related to cancer   - given metastatic cancer and imaging findings with opacification of Rt hemithorax, I would recommend to avoid endoscopy unless he develops overt bleeding with significant drop in Hgb requiring another transfusion; ideally would opt for conservative medical management     - no overt bleeding overnight, and H&H relatively " stable  - continue with medical management  - please alert me if patient develops overt GI bleeding       Thank you for the consult and including me in the care of this patient. Please call me with questions.     Marko Hammond MD  Gastroenterology

## 2024-02-06 NOTE — ASSESSMENT & PLAN NOTE
Patient's anemia is currently controlled. Received 1 unit PRBC on 2/5. Etiology likely d/t chronic disease due to Malignancy and chemotherapy.  Baseline Hgb prior to being diagnosed with malignancy was ~ 12.   Admission Hgb 8.6.   Current CBC reviewed-   Lab Results   Component Value Date    HGB 7.6 (L) 02/06/2024    HCT 25.2 (L) 02/06/2024     B12/folate WNL, LDH/haptoglobin NOT suggestive of hemolysis, iron panel consistent with anemia of chronic disease.   Stool for occult blood is positive, no active bleeding noted.  Underwent colonoscopy (screening) in 2022 but prep was poor and patient was supposed to repeat colonoscopy in 1 year but has not gotten a chance yet.  Hgb dropped to 6.7 on 2/5, GI consulted and recommend to monitor Hgb without endoscopic evaluation, will consider endoscopy if Hgb significantly drops.   Hold home aspirin and Lovenox for VTE prophylaxis for now, If Hgb remain stable x 24 hours then will resume.   Monitor serial CBC and transfuse if patient becomes hemodynamically unstable, symptomatic or H/H drops below 7/21.

## 2024-02-06 NOTE — ASSESSMENT & PLAN NOTE
Creatine stable for now. BMP reviewed- noted Estimated Creatinine Clearance: 86.7 mL/min (based on SCr of 0.74 mg/dL). according to latest data. Based on current GFR, CKD stage is stage 3 - GFR 30-59.  Monitor UOP and serial BMP and adjust therapy as needed. Renally dose meds. Avoid nephrotoxic medications and procedures.

## 2024-02-06 NOTE — ASSESSMENT & PLAN NOTE
CT L-spine showed acute pathologic L1 compression fracture with mild retropulsion, no spinal stenosis reported.  MRI lumbar spine showed 1.1 cm bony fragment or soft tissue located within the spinal canal. Acute severely comminuted compression fracture of L1 associated with bone marrow edema. Furthermore there is 0.7 cm of retropulsion of the superior endplate of L1 contributing to severe spinal canal narrowing.  Ortho-spine (Dr. Ceballos) consulted for opinion on MRI findings.   Pain control.

## 2024-02-06 NOTE — PT/OT/SLP PROGRESS
"Occupational Therapy      Patient Name:  Steven Campo   MRN:  [id number]    Patient not seen today secondary to pt's request. Pt presents with complaint of 7/10 back pain and stated "Can we do this tomorrow,this cancer is working on me". Pt declined all offers of therapy. follow-up 2/7/24.    2/6/2024  "

## 2024-02-06 NOTE — PLAN OF CARE
Faxed updates to Jessica at [facility]. Faxed TB skin test, s/s, and cxr. Started packet. Spoke with Dr Cruz about plan of care. Patient will have a pleurX catheter placed soon. Notified Jessica to see if they can accept with catheter. Cm will follow.

## 2024-02-07 NOTE — ASSESSMENT & PLAN NOTE
"This patient does have evidence of infective focus  My overall impression is sepsis, this is improving.   Source: Respiratory  Antibiotics given-   Antibiotics (72h ago, onward)      Start     Stop Route Frequency Ordered    02/04/24 1600  cefTRIAXone (ROCEPHIN) 2 g in dextrose 5 % in water (D5W) 100 mL IVPB (MB+)         02/10/24 1559 IV Every 24 hours (non-standard times) 02/03/24 1606    02/04/24 1600  azithromycin (ZITHROMAX) 500 mg in dextrose 5 % (D5W) 250 mL IVPB         02/08/24 1559 IV Every 24 hours (non-standard times) 02/03/24 1606          Latest lactate reviewed-  No results for input(s): "LACTATE", "POCLAC" in the last 72 hours.    Fluid challenge Actual Body weight- Patient will receive 30ml/kg actual body weight to calculate fluid bolus for treatment of septic shock.     Post- resuscitation assessment No - Post resuscitation assessment not needed       Will Not start Pressors- Levophed for MAP of 65  Oxygenation stable on room air.   Source control achieved by: antibiotics (ceftriaxone x 7 days and azithromycin x 5 days)- covering broader given recent chemo and underlying lung cancer possibly with post obstruction.   Follow BAL cultures.   Vancomycin discontinued given MRSA PCR negative.   "

## 2024-02-07 NOTE — ASSESSMENT & PLAN NOTE
Chronic, controlled. Latest blood pressure and vitals reviewed-     Temp:  [97.9 °F (36.6 °C)-99.2 °F (37.3 °C)]   Pulse:  [103-119]   Resp:  [14-20]   BP: (112-149)/(65-84)   SpO2:  [95 %-100 %] .   Home meds for hypertension were reviewed and noted below.   Hypertension Medications               amLODIPine (NORVASC) 5 MG tablet Take 1 tablet (5 mg total) by mouth once daily.            While in the hospital, will manage blood pressure as follows; Continue home antihypertensive regimen    Will utilize p.r.n. blood pressure medication only if patient's blood pressure greater than 180/110 and he develops symptoms such as worsening chest pain or shortness of breath.

## 2024-02-07 NOTE — OR NURSING
1102 Rec'd pt to PACU awake and alert with no signs of bleeding/distress noted, respirations even and unlabored. VSS. Denies pain/needs. Will continue to monitor.     1117 Attempt to call pt family, no answer at this time.     1132 Out of PACU. VSS. No signs of bleeding/distress noted.     1145 Pt to room 463 awake and alert with no bleeding/distress noted, respirations even and unlabored. Family at bedside. Bedside report given to EFE Burnette RN. Moved pt to regular bed with safety precautions in place. Denies pain/needs. /81, P 116, R 16, O2 96% 2L NC, T 98.0 oral.

## 2024-02-07 NOTE — SUBJECTIVE & OBJECTIVE
Interval History: Patient seen and examined at the bedside, doing ok.     Review of Systems   Constitutional:  Positive for fatigue.   Respiratory:  Positive for cough.    Neurological:  Positive for weakness.     Objective:     Vital Signs (Most Recent):  Temp: 98.5 °F (36.9 °C) (02/07/24 1230)  Pulse: (!) 117 (02/07/24 1230)  Resp: 18 (02/07/24 1230)  BP: 130/74 (02/07/24 1230)  SpO2: 96 % (02/07/24 1230) Vital Signs (24h Range):  Temp:  [97.9 °F (36.6 °C)-99.2 °F (37.3 °C)] 98.5 °F (36.9 °C)  Pulse:  [103-119] 117  Resp:  [14-20] 18  SpO2:  [95 %-100 %] 96 %  BP: (112-149)/(65-84) 130/74     Weight: 62.4 kg (137 lb 9.1 oz)  Body mass index is 19.19 kg/m².    Intake/Output Summary (Last 24 hours) at 2/7/2024 1253  Last data filed at 2/7/2024 1130  Gross per 24 hour   Intake 25 ml   Output 500 ml   Net -475 ml           Physical Exam  Vitals and nursing note reviewed.   Constitutional:       Comments: Frail appearing     HENT:      Head: Normocephalic and atraumatic.      Nose: Nose normal.      Mouth/Throat:      Mouth: Mucous membranes are moist.   Eyes:      Extraocular Movements: Extraocular movements intact.   Cardiovascular:      Rate and Rhythm: Regular rhythm. Tachycardia present.      Pulses: Normal pulses.      Heart sounds: Normal heart sounds.   Pulmonary:      Effort: Pulmonary effort is normal.      Breath sounds: Examination of the right-upper field reveals decreased breath sounds. Examination of the right-middle field reveals decreased breath sounds. Examination of the right-lower field reveals decreased breath sounds. Decreased breath sounds present.   Abdominal:      General: Bowel sounds are normal.      Palpations: Abdomen is soft.   Musculoskeletal:         General: Normal range of motion.      Cervical back: Normal range of motion.   Skin:     General: Skin is warm.   Neurological:      General: No focal deficit present.      Mental Status: He is alert and oriented to person, place, and time.       Motor: Weakness present.   Psychiatric:         Mood and Affect: Mood normal.         Behavior: Behavior normal.             Significant Labs: All pertinent labs within the past 24 hours have been reviewed.    Significant Imaging: I have reviewed all pertinent imaging results/findings within the past 24 hours.

## 2024-02-07 NOTE — CONSULTS
Office: [phone]    Orthopedic Spine Surgery Consult/H&P    ASSESSMENT:  68 y.o. male with metastatic lung cancer with multiple lesions cervical, thoracic, lumbar spine.  Acute L1 pathologic compression fracture with large soft tissue mass invading the spinal canal and right anterior paraspinal region.  Pleural effusion and anemia    PLAN:  His lower extremity strength is at baseline.  No plans for surgery at this time.  Recommend a TLSO brace for comfort.  He can follow-up in clinic 2 weeks after discharge from the hospital    HPI:  68 y.o. male with known stage IV lung cancer being treated with radiation by Dr. Sharma after having received chemotherapy.  Presented to the ER 02/03/2024 complaining of leg weakness.  He sustained a fall from standing and was not able to get up.  He was found down on the floor and brought in by EMS.  He was found to have right lung atelectasis and right-sided pleural fusion.  CTs cervical, thoracic, lumbar spine and MRI lumbar spine detailed below showed multiple spinal metastases in a large soft tissue mass at L1 with pathologic fracture and stenosis.  Reports history of prior CVA about 15 years ago with residual right-sided weakness.    Past Medical History:   Diagnosis Date    Adenocarcinoma of lung     Dx 2/22/23    Chronic bilateral low back pain with right-sided sciatica     Chronic pain of right knee     Essential (primary) hypertension     Hypercholesteremia     Nicotine dependence, cigarettes, uncomplicated     Seizures 2020    last was approx 7 mths ago    Stroke 2015    with residual right sided weakness      Past Surgical History:   Procedure Laterality Date    KNEE ARTHROSCOPY Right 1995      Review of patient's allergies indicates:   Allergen Reactions    Salsalate      Other reaction(s): Abdominal pain    Tramadol      Other reaction(s): Seizure        Current Facility-Administered Medications:     0.9%  NaCl infusion (for blood administration), , Intravenous,  Q24H PRN, Rafael Cruz MD, New Bag at 02/05/24 1219    acetaminophen tablet 650 mg, 650 mg, Oral, Q4H PRN, Rafael Cruz MD    albuterol-ipratropium 2.5 mg-0.5 mg/3 mL nebulizer solution 3 mL, 3 mL, Nebulization, Q4H PRN, Rafael Cruz MD    aluminum-magnesium hydroxide-simethicone 200-200-20 mg/5 mL suspension 30 mL, 30 mL, Oral, QID PRN, Rafael Cruz MD    amLODIPine tablet 5 mg, 5 mg, Oral, Daily, Rafael Cruz MD, 5 mg at 02/06/24 0813    atorvastatin tablet 40 mg, 40 mg, Oral, Daily, Rafael Cruz MD, 40 mg at 02/06/24 0813    azithromycin (ZITHROMAX) 500 mg in dextrose 5 % (D5W) 250 mL IVPB, 500 mg, Intravenous, Q24H, Rafael Cruz MD, Stopped at 02/06/24 1631    cefTRIAXone (ROCEPHIN) 2 g in dextrose 5 % in water (D5W) 100 mL IVPB (MB+), 2 g, Intravenous, Q24H, Rafael Cruz MD, Stopped at 02/06/24 1904    finasteride tablet 5 mg, 5 mg, Oral, Daily, Rafael Cruz MD, 5 mg at 02/06/24 0813    HYDROcodone-acetaminophen 5-325 mg per tablet 1 tablet, 1 tablet, Oral, Q6H PRN, Rafael Cruz MD, 1 tablet at 02/06/24 2019    levalbuterol nebulizer solution 1.25 mg, 1.25 mg, Nebulization, Q8H, Rafael Cruz MD, 1.25 mg at 02/07/24 0806    naloxone 0.4 mg/mL injection 0.02 mg, 0.02 mg, Intravenous, PRN, Rafael Cruz MD    nicotine 14 mg/24 hr 1 patch, 1 patch, Transdermal, Daily, Rafael Cruz MD    ondansetron injection 4 mg, 4 mg, Intravenous, Q8H PRN, Rafael Cruz MD    pantoprazole injection 40 mg, 40 mg, Intravenous, BID, Marko Hammond MD, 40 mg at 02/06/24 2019    potassium chloride SA CR tablet 20 mEq, 20 mEq, Oral, Once, Rafael Cruz MD    sodium chloride 0.9% flush 10 mL, 10 mL, Intravenous, Q12H PRN, Rafael Cruz MD    traZODone tablet 50 mg, 50 mg, Oral, Nightly PRN, Rafael Cruz MD, 50 mg at 02/06/24 2019     Review of systems:  Denies chest pain, nausea,vomiting, abdominal pain, cough, runny nose, eye pain, ear  pain, fevers, chills, weight loss, weight gain, dysuria, hematuria, changes in mood    IMAGING:  CT cervical spine 02/03/2024 reviewed shows:   There is a patchy lytic lesion in the C2 vertebral body and dens.  There is normal coronal alignment.  There is loss of cervical lordosis.  There is severe subaxial spondylotic disease with severe bilateral foraminal stenosis at C3-4, C4-5, C5-6, C6-7, C7-T1.  No apparent fractures.  No listhesis    CT thoracic spine 02/03/2024 reviewed shows:  There is a sweeping thoracolumbar curvature to the left.  There is severe thoracic spondylosis with bridging osteophytes.  There is a right pleural fusion.    At T1-2 there is severe bilateral foraminal stenosis  At T2-3 there is severe bilateral foraminal stenosis   At T3-4 there is severe right foraminal stenosis  At T8 there is a lytic lesion in the left T8 lamina extending into the spinous process    CT lumbar spine 02/03/2024 reviewed shows:  There is a right thoracolumbar curvature.  Severe lumbar spondylosis noted with decreased lumbar lordosis.  At L1 there is a pathologic vertebral body fracture with a large lytic lesion involving the vertebral body with a right anterior paraspinal soft tissue mass extending from the vertebral body  At L1-2 there is moderate bilateral bony foraminal stenosis   At L2-3 there is severe bilateral bony foraminal stenosis   At L3-4 there is severe bilateral bony foraminal stenosis   At L4-5 there is severe bilateral lateral recess stenosis.  Severe bilateral bony foraminal stenosis  At L5-S1 there is severe bilateral foraminal stenosis  There are multiple lytic lesions in the bilateral kymberly right-greater-than-left    MRI lumbar spine 02/05/2024 reviewed shows:  There is motion artifact on the axial T2 series at obscures some fine detail  At L1 there is a pathologic fracture with increased STIR signal.  This is soft tissue mass extending from the root vertebral body into the right anterior  paraspinals and into the spinal canal  At L1-2 there is severe central stenosis.  Severe bilateral lateral recess stenosis.  Severe bilateral foraminal stenosis   At L2-3 there is moderate bilateral lateral recess stenosis.  Severe bilateral foraminal stenosis  At L3-4 there is moderate bilateral lateral recess stenosis.  Severe bilateral foraminal stenosis  At L4-5 there is severe bilateral lateral recess stenosis.  Severe left-greater-than-right foraminal stenosis   At L5-S1 there is severe bilateral lateral recess stenosis.  Severe bilateral foraminal stenosis    Vitals:    02/07/24 0851   BP: 121/74   Pulse: (!) 113   Resp: 14   Temp: 98.2 °F (36.8 °C)        EXAM:  Constitutional  General Appearance:  Cachectic  Psychiatric   Orientation: Oriented to time, oriented to place, oriented to person  Mood and Affect: Active and alert, normal mood, normal affect  5/5 strength left lower extremity   4/5 strength globally right lower extremity

## 2024-02-07 NOTE — TRANSFER OF CARE
"Anesthesia Transfer of Care Note    Patient: Steven Campo    Procedure(s) Performed: * No procedures listed *    Patient location: PACU    Anesthesia Type: general    Transport from OR: Transported from OR on 2-3 L/min O2 by NC with adequate spontaneous ventilation    Post pain: adequate analgesia    Post assessment: no apparent anesthetic complications and tolerated procedure well    Post vital signs: stable    Level of consciousness: awake, alert and oriented    Nausea/Vomiting: no nausea/vomiting    Complications: none    Transfer of care protocol was followed      Last vitals: Visit Vitals  /74   Pulse (!) 113   Temp 36.8 °C (98.2 °F) (Oral)   Resp 14   Ht 5' 11" (1.803 m)   Wt 62.4 kg (137 lb 9.1 oz)   SpO2 100%   BMI 19.19 kg/m²     "

## 2024-02-07 NOTE — ASSESSMENT & PLAN NOTE
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  He also has a mediastinal shift.  When compared to his initial CT from November 2022, he had a very similar obstruction of the bronchus intermedius.  At this time his endobronchial obstruction appears to be very similar however he does have increasing pleural effusion which may be in the setting of compressive atelectasis.    He did have evidence of endobronchial disease during his bronchoscopy in February 2023.    Given that he is minimally symptomatic at this time, stenting the bronchus intermedius is unlikely to be of any additional benefit.  He is yet to complete his radiation treatment at this time and was most recently on systemic treatment for his lung cancer.  His mild dyspnea may be improved by helping with his pleural effusion (which may benefit by placement of PleurX catheter-although there are significant concerns that he may have trapped lung physiology given his mass and the lung may not expand fully).    Update 2/7/24-I discussed in detail with him in his sister and there are significant logistical challenges (the patient returning home alone after his expected nursing home/swing bed placement and no significant help from family).  In this situation we will drain the effusion today using a chest tube (given the obstruction the lung may not expand fully and chest tube with a allow gradual drainage of the fluid).    Recommendations   Continue to monitor hemoglobin/hematocrit and coagulation studies to ensure stabilization  We will plan to perform airway exam with bronchoalveolar lavage (for concern regarding postobstructive pneumonia) under general anesthesia today with placement of way pneumothorax 14 [place] pigtail catheter in the right pleural space

## 2024-02-07 NOTE — PHYSICIAN QUERY
PT Name: Steven Campo  MR #: [id number]    DOCUMENTATION CLARIFICATION     CDS/: ROBERT RAMOS MSN RN              Contact information: [email or web address]    This form is a permanent document in the medical record.     Query Date: February 7, 2024    By submitting this query, we are merely seeking further clarification of documentation.  Please utilize your independent clinical judgment when addressing the question(s) below.    The medical record contains the following:   Indicators  Supporting Clinical Findings Location in Medical Record   X Registered Dietician Diagnosis Per ASPEN guidelines, patient will meet criteria for moderate protein-calorie malnutrition.     Is Patient Malnourished: Yes   Malnutrition Assessment  Malnutrition Context: chronic illness  Malnutrition Level: moderate     PN, Monsour RD, 02/05   X Energy Intake Energy Intake (Malnutrition): less than 75% for greater than or equal to 1 month   PN, Monsour RD, 02/05    Weight Loss     X Fat Loss Orbital Region (Subcutaneous Fat Loss): moderate depletion    Upper Arm Region (Subcutaneous Fat Loss): moderate depletion   PN, Monsour RD, 02/05   X Muscle Loss Anabaptist Region (Muscle Loss): moderate depletion    Clavicle Bone Region (Muscle Loss): moderate depletion PN, Monsour RD, 02/05    Edema/Fluid Accumulation      Reduced  Strength (by dynamometer)     X Weight, BMI, Usual Body Weight Weight: 62.4 kg (137 lb 9.1 oz)  Weight (lb): 137.57 lb    Ideal Body Weight (IBW), Male: 172 lb  % Ideal Body Weight, Male (lb): 75.58 %    BMI (Calculated): 19.2 PN, Monsour RD, 02/05    Delayed Wound Healing     X Acute or Chronic Illness Generalized weakness  Sepsis due to pneumonia  Pleural effusion  Malignant neoplasm of right lung  Closed compression fracture of L1 vertebra  Tobacco dependence  DNR  Normocytic anemia  BPH with urinary frequency  CKD stage 3b  Essential HTN  Hx of stroke PN, Dr. Cruz, 02/06    Social or Environmental  Circumstances     X Treatment Nutrition Prescription / Recommendations  Recommendation/Intervention: Recommend resume Regular diet as tolerated. Encourage good PO intakes. If poor PO intakes, recommend addition of Boost Plus TID to improve kcal/protein intake    Monitor and Evaluation  Anthropometric Measurements: weight change, weight  Biochemical Data, Medical Tests and Procedures: electrolyte and renal panel, gastrointestinal profile, glucose/endocrine profile, inflammatory profile, lipid profile Lorena HARVEY RD, 02/05    Other       Academy of Nutrition and Dietetics (Academy) and the American Society for Parenteral and Enteral Nutrition (A.S.P.E.N.) Clinical Characteristics to support Malnutrition      Criteria for mild malnutrition is defined as 1 characteristic outlined above within the established moderate or severe parameters.  A minimum of 2 out of the 6 characteristics noted above are recommended for a diagnosis of moderate or severe malnutrition.  Chronic illness/injury is a disease/condition lasting 3 months or longer.    The noted clinical guidelines are only system guidelines and do not replace the providers clinical judgment.    Provider, please specify the diagnosis or diagnoses associated with above clinical findings.  Check all that apply.    [  x] Moderate Malnutrition - a minimum of 2 of the 6 moderate malnutrition characteristics noted above    [  ] Other Nutritional Diagnosis (please specify): _______       Please document in your progress notes daily for the duration of treatment until resolved and  include in your discharge summary.      Reference:    YANETH Vargas, PhD, RD, Sharri RAMOS P., PhD, RN, DAYANA Quiles MD, PhD, Norma JACQUES A., MS, RD, [facility], JAMEL Baldwin, MS, RD, The Academy Malnutrition Work Group, The A.S.P.E.N. Board of Directors. (2012). Consensus Statement: Academy of Nutrition and Dietetics and American Society for Parenteral and Enteral Nutrition: Characteristics  Recommended for the Identification and Documentation of Adult Malnutrition (Undernutrition). Journal of Parenteral and Enteral Nutrition, 36(3), 275-432. doi:10.1177/8798154854245077     Form No. 81705

## 2024-02-07 NOTE — ASSESSMENT & PLAN NOTE
Patient's anemia is currently controlled. Received 1 unit PRBC on 2/5. Etiology likely d/t chronic disease due to Malignancy and chemotherapy.  Baseline Hgb prior to being diagnosed with malignancy was ~ 12.   Admission Hgb 8.6.   Current CBC reviewed-   Lab Results   Component Value Date    HGB 7.3 (L) 02/07/2024    HCT 22.7 (L) 02/07/2024     B12/folate WNL, LDH/haptoglobin NOT suggestive of hemolysis, iron panel consistent with anemia of chronic disease.   Stool for occult blood is positive, no active bleeding noted.  Underwent colonoscopy (screening) in 2022 but prep was poor and patient was supposed to repeat colonoscopy in 1 year but has not gotten a chance yet.  Hgb dropped to 6.7 on 2/5, GI consulted and recommend to monitor Hgb without endoscopic evaluation, will consider endoscopy if Hgb significantly drops.   Hold home aspirin and Lovenox for VTE prophylaxis for now, If Hgb remain stable x 24 hours then will resume.   Monitor serial CBC and transfuse if patient becomes hemodynamically unstable, symptomatic or H/H drops below 7/21.

## 2024-02-07 NOTE — ANESTHESIA POSTPROCEDURE EVALUATION
Anesthesia Post Evaluation    Patient: Steven Campo    Procedure(s) Performed: * No procedures listed *    Final Anesthesia Type: general      Patient location during evaluation: PACU  Patient participation: Yes- Able to Participate  Level of consciousness: awake and alert  Post-procedure vital signs: reviewed and stable  Pain management: adequate  Airway patency: patent  ANN MARIE mitigation strategies: Multimodal analgesia  PONV status at discharge: No PONV  Anesthetic complications: no      Cardiovascular status: blood pressure returned to baseline  Respiratory status: unassisted  Hydration status: euvolemic  Follow-up not needed.              Vitals Value Taken Time   /74 02/07/24 1412   Temp 36.9 °C (98.5 °F) 02/07/24 1412   Pulse 115 02/07/24 1412   Resp 12 02/07/24 1412   SpO2 98 % 02/07/24 1412         Event Time   Out of Recovery 02/07/2024 11:32:00         Pain/Charity Score: Pain Rating Prior to Med Admin: 7 (2/6/2024  8:19 PM)  Pain Rating Post Med Admin: 0 (2/6/2024  9:05 PM)  Charity Score: 9 (2/7/2024 11:52 AM)

## 2024-02-07 NOTE — SUBJECTIVE & OBJECTIVE
Interval History:  As an hospital course      Objective:     Vital Signs (Most Recent):  Temp: 98.2 °F (36.8 °C) (02/07/24 0851)  Pulse: (!) 113 (02/07/24 0851)  Resp: 14 (02/07/24 0851)  BP: 121/74 (02/07/24 0851)  SpO2: 99 % (02/07/24 0851) Vital Signs (24h Range):  Temp:  [98.2 °F (36.8 °C)-99.2 °F (37.3 °C)] 98.2 °F (36.8 °C)  Pulse:  [103-115] 113  Resp:  [14-20] 14  SpO2:  [95 %-99 %] 99 %  BP: (112-129)/(65-78) 121/74     Weight: 62.4 kg (137 lb 9.1 oz)  Body mass index is 19.19 kg/m².      Intake/Output Summary (Last 24 hours) at 2/7/2024 0923  Last data filed at 2/6/2024 2304  Gross per 24 hour   Intake --   Output 650 ml   Net -650 ml        Physical Exam  Constitutional:       General: He is not in acute distress.     Appearance: Normal appearance. He is normal weight. He is not ill-appearing or diaphoretic.   HENT:      Head: Normocephalic and atraumatic.      Nose: No congestion or rhinorrhea.      Mouth/Throat:      Mouth: Mucous membranes are moist.   Cardiovascular:      Rate and Rhythm: Normal rate and regular rhythm.      Pulses: Normal pulses.      Heart sounds: Normal heart sounds.   Pulmonary:      Effort: Pulmonary effort is normal. No respiratory distress.      Breath sounds: No stridor. No wheezing, rhonchi or rales.      Comments:   Decreased breath sounds right hemithorax  Chest:      Chest wall: No tenderness.   Abdominal:      General: Abdomen is flat.   Musculoskeletal:      Cervical back: Normal range of motion. No rigidity.      Right lower leg: No edema.      Left lower leg: No edema.   Skin:     General: Skin is warm.      Findings: No erythema.   Neurological:      General: No focal deficit present.      Mental Status: He is alert and oriented to person, place, and time. Mental status is at baseline.   Psychiatric:         Mood and Affect: Mood normal.         Behavior: Behavior normal.         Thought Content: Thought content normal.           Review of Systems    Vents:        Lines/Drains/Airways       Peripheral Intravenous Line  Duration                  Peripheral IV - Single Lumen 02/06/24 1655 22 G Posterior;Right Forearm <1 day                    Significant Labs:    CBC/Anemia Profile:  Recent Labs   Lab 02/05/24  1405 02/06/24  0510 02/07/24  0356   WBC  --  13.51* 13.72*   HGB 8.0* 7.6* 7.3*   HCT 25.2* 25.2* 22.7*   PLT  --  324 356   MCV  --  96.9* 89.7   RDW  --  17.2* 16.4*        Chemistries:  Recent Labs   Lab 02/06/24  0616 02/07/24  0356   * 135*   K 3.8 3.4*    103   CO2 27 28   BUN 14 10   CREATININE 0.74 0.79   CALCIUM 8.9 8.7   ALBUMIN 1.6* 1.5*   PROT 6.3* 6.3*   BILITOT 0.2 0.2   ALKPHOS 120* 103   ALT 17 23   AST 40* 45*       All pertinent labs within the past 24 hours have been reviewed.  Hemoglobin stable and platelets within normal limits for today's procedure    Significant Imaging:  I have reviewed all pertinent imaging results/findings within the past 24 hours.

## 2024-02-07 NOTE — PLAN OF CARE
Problem: Adult Inpatient Plan of Care  Goal: Plan of Care Review  Outcome: Ongoing, Progressing  Goal: Patient-Specific Goal (Individualized)  Outcome: Ongoing, Progressing  Goal: Absence of Hospital-Acquired Illness or Injury  Outcome: Ongoing, Progressing     Problem: Skin Injury Risk Increased  Goal: Skin Health and Integrity  Outcome: Ongoing, Progressing     Problem: Airway Clearance Ineffective  Goal: Effective Airway Clearance  Outcome: Ongoing, Progressing     Problem: Adjustment to Illness (Sepsis/Septic Shock)  Goal: Optimal Coping  Outcome: Ongoing, Progressing     Problem: Bleeding (Sepsis/Septic Shock)  Goal: Absence of Bleeding  Outcome: Ongoing, Progressing

## 2024-02-07 NOTE — ANESTHESIA PROCEDURE NOTES
Intubation    Date/Time: 2/7/2024 10:11 AM    Performed by: Ivan Vasquez CRNA  Authorized by: Harjeet Rhodes MD    Intubation:     Induction:  Intravenous    Intubated:  Postinduction    Mask Ventilation:  Easy mask    Attempts:  1    Attempted By:  CRNA    Method of Intubation:  Direct    Blade:  Eula 3    Laryngeal View Grade: Grade I - full view of cords      Difficult Airway Encountered?: No      Complications:  None    Airway Device:  Oral endotracheal tube    Airway Device Size:  8.0    Style/Cuff Inflation:  Cuffed (inflated to minimal occlusive pressure)    Tube secured:  23    Secured at:  The lips    Placement Verified By:  Capnometry    Complicating Factors:  None    Findings Post-Intubation:  Atraumatic/condition of teeth unchanged and BS equal bilateral

## 2024-02-07 NOTE — PROGRESS NOTES
"[facility] Hospital Medicine  Progress Note    Patient Name: Steven Campo  MRN: [id number]  Patient Class: IP- Inpatient   Admission Date: 2/3/2024  Length of Stay: 4 days  Attending Physician: Rafael Cruz MD  Primary Care Provider: Dawn Dumont FNP        Subjective:     Principal Problem:Generalized weakness        HPI:  Mr. Campo is a 68 Y O male with PMH of Lung cancer (stage 4, with mets to spine) s/p chemo, currently undergoing radiation, HTN, history of CVA with residual right hemiplegia, BPH, tobacco dependence, who presented to ED via EMS with fall and generalized weakness. Patient reports since last 2 days he has gotten progressively weak where his legs will "give out" whenever he tries to walk. He slid down to the floor from the couch last night, unable to get up. Sister called EMS. Patient reports productive cough with brownish sputum and some exertional shortness of breath. No reports of fever, chills, ill contacts. Patient denies chest pain, leg swelling, numbness in lower extremities, bowel or bladder incontinence. He denies head trauma, seizure like activity or LOC. He has generally gotten weaker since his diagnosis of lung cancer exactly a year ago and especially since completing chemotherapy 3 weeks prior to admission. He was started on radiation therapy for his mets to spine, and he has undergone 4 out of 6 sessions. He follows with Dr. Vazquez for his lung cancer.     Overview/Hospital Course:  2/4: Pulmonology evaluated, recommend no thoracentesis until right bronchus obstruction is relieved as the lung is not likely to expand. Will run it by interventional Pulm for options.   2/5: D/W Dr. Robert (interventional Pulm), plan for PleurX catheter and bronch with BAL on 2/7. GI consulted given drop in Hgb- they recommend to monitor Hgb without endoscopic evaluation for now.   2/7: Hgb with slight drop but overall stable. S/p bronch, interventional Pulm decided not " to insert PleurX given large endobronchial mass and would not gain benefit from it. BAL sent. SNF placement in progress. Ortho-spine consulted given compression fracture and abnormal MRI L-spine, recommend no surgical intervention, TLSO brace recommended.     Interval History: Patient seen and examined at the bedside, doing ok.     Review of Systems   Constitutional:  Positive for fatigue.   Respiratory:  Positive for cough.    Neurological:  Positive for weakness.     Objective:     Vital Signs (Most Recent):  Temp: 98.5 °F (36.9 °C) (02/07/24 1230)  Pulse: (!) 117 (02/07/24 1230)  Resp: 18 (02/07/24 1230)  BP: 130/74 (02/07/24 1230)  SpO2: 96 % (02/07/24 1230) Vital Signs (24h Range):  Temp:  [97.9 °F (36.6 °C)-99.2 °F (37.3 °C)] 98.5 °F (36.9 °C)  Pulse:  [103-119] 117  Resp:  [14-20] 18  SpO2:  [95 %-100 %] 96 %  BP: (112-149)/(65-84) 130/74     Weight: 62.4 kg (137 lb 9.1 oz)  Body mass index is 19.19 kg/m².    Intake/Output Summary (Last 24 hours) at 2/7/2024 1253  Last data filed at 2/7/2024 1130  Gross per 24 hour   Intake 25 ml   Output 500 ml   Net -475 ml           Physical Exam  Vitals and nursing note reviewed.   Constitutional:       Comments: Frail appearing     HENT:      Head: Normocephalic and atraumatic.      Nose: Nose normal.      Mouth/Throat:      Mouth: Mucous membranes are moist.   Eyes:      Extraocular Movements: Extraocular movements intact.   Cardiovascular:      Rate and Rhythm: Regular rhythm. Tachycardia present.      Pulses: Normal pulses.      Heart sounds: Normal heart sounds.   Pulmonary:      Effort: Pulmonary effort is normal.      Breath sounds: Examination of the right-upper field reveals decreased breath sounds. Examination of the right-middle field reveals decreased breath sounds. Examination of the right-lower field reveals decreased breath sounds. Decreased breath sounds present.   Abdominal:      General: Bowel sounds are normal.      Palpations: Abdomen is soft.  "  Musculoskeletal:         General: Normal range of motion.      Cervical back: Normal range of motion.   Skin:     General: Skin is warm.   Neurological:      General: No focal deficit present.      Mental Status: He is alert and oriented to person, place, and time.      Motor: Weakness present.   Psychiatric:         Mood and Affect: Mood normal.         Behavior: Behavior normal.             Significant Labs: All pertinent labs within the past 24 hours have been reviewed.    Significant Imaging: I have reviewed all pertinent imaging results/findings within the past 24 hours.    Assessment/Plan:      * Generalized weakness  Presented with fall onto bottom, inability to ambulate x 2 days PTA.   Suspect overall debility due to malignancy, chemotherapy.   Known right sided weakness, no red flags on clinical exam.  CT T/L spine with metastatic disease and compression fracture respectively.   PT/OT consulted.  SW assisting with SNF placement.       Sepsis due to pneumonia  This patient does have evidence of infective focus  My overall impression is sepsis, this is improving.   Source: Respiratory  Antibiotics given-   Antibiotics (72h ago, onward)      Start     Stop Route Frequency Ordered    02/04/24 1600  cefTRIAXone (ROCEPHIN) 2 g in dextrose 5 % in water (D5W) 100 mL IVPB (MB+)         02/10/24 1559 IV Every 24 hours (non-standard times) 02/03/24 1606    02/04/24 1600  azithromycin (ZITHROMAX) 500 mg in dextrose 5 % (D5W) 250 mL IVPB         02/08/24 1559 IV Every 24 hours (non-standard times) 02/03/24 1606          Latest lactate reviewed-  No results for input(s): "LACTATE", "POCLAC" in the last 72 hours.    Fluid challenge Actual Body weight- Patient will receive 30ml/kg actual body weight to calculate fluid bolus for treatment of septic shock.     Post- resuscitation assessment No - Post resuscitation assessment not needed       Will Not start Pressors- Levophed for MAP of 65  Oxygenation stable on room air. "   Source control achieved by: antibiotics (ceftriaxone x 7 days and azithromycin x 5 days)- covering broader given recent chemo and underlying lung cancer possibly with post obstruction.   Follow BAL cultures.   Vancomycin discontinued given MRSA PCR negative.     Pleural effusion  Patient found to have large pleural effusion on imaging.   I have personally reviewed and interpreted the following imaging: Xray and CT chest.   Most likely etiology includes metastatic lung cancer.   Discussed with Pulm- Dr. Nugent, hold on thoracentesis since there is right mainstem bronchus obstruction from the tumor and the lung is not likely to expand after thoracentesis, and patient is stable from respiratory stand point.  Discussed with interventional Pulmonologist- Dr. Reeves, s/p airway exam/BAL (2/7), large endobronchial mass noted with complete obstruction of right lung, PleurX catheter not placed as it would not benefit the patient. Recommend to monitor symptoms.     Malignant neoplasm of right lung  In 11/2022 patient had CXR concerning for right hilar mass, CT chest confirmed the mass with lymph nodes suggestive of malignancy.   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   CT cervical spine consistent with C2 metastatic lesion.   S/p chemotherapy completion 3 weeks ago and currently receiving radiation therapy for spinal mets (4 out of 6 sessions completed).   Follows with Dr. Vazquez.       Closed compression fracture of L1 vertebra  CT L-spine showed acute pathologic L1 compression fracture with mild retropulsion, no spinal stenosis reported.  MRI lumbar spine showed 1.1 cm bony fragment or soft tissue located within the spinal canal. Acute severely comminuted compression fracture of L1 associated with bone marrow edema. Furthermore there is 0.7 cm of retropulsion of the superior endplate of L1 contributing to severe spinal canal  narrowing.  Ortho-spine (Dr. Ceballos) consulted for opinion on MRI findings, recommend no surgical intervention needed at this time, recommend a TLSO brace for comfort.  He can follow-up in ortho-spine clinic 2 weeks after discharge from the hospital    Pain control.       Tobacco dependence  Dangers of cigarette smoking were reviewed with patient in detail. Patient was Referred to Tobacco Cessation Program. Nicotine replacement options were discussed. Nicotine replacement was discussed- prescribed    DNR (do not resuscitate)  Discussed code status on admission, in the presence of ED RN.   Confirms DNR and do not intubate.   Prognosis is guarded given multiple mets/poorly differentiated lung cancer, consider discussing hospice.       Normocytic anemia  Patient's anemia is currently controlled. Received 1 unit PRBC on 2/5. Etiology likely d/t chronic disease due to Malignancy and chemotherapy.  Baseline Hgb prior to being diagnosed with malignancy was ~ 12.   Admission Hgb 8.6.   Current CBC reviewed-   Lab Results   Component Value Date    HGB 7.3 (L) 02/07/2024    HCT 22.7 (L) 02/07/2024     B12/folate WNL, LDH/haptoglobin NOT suggestive of hemolysis, iron panel consistent with anemia of chronic disease.   Stool for occult blood is positive, no active bleeding noted.  Underwent colonoscopy (screening) in 2022 but prep was poor and patient was supposed to repeat colonoscopy in 1 year but has not gotten a chance yet.  Hgb dropped to 6.7 on 2/5, GI consulted and recommend to monitor Hgb without endoscopic evaluation, will consider endoscopy if Hgb significantly drops.   Hold home aspirin and Lovenox for VTE prophylaxis for now, If Hgb remain stable x 24 hours then will resume.   Monitor serial CBC and transfuse if patient becomes hemodynamically unstable, symptomatic or H/H drops below 7/21.    Benign prostatic hyperplasia with urinary frequency  Resume home finasteride.       Mass of right lung  Plan as outlined above.        Stage 3b chronic kidney disease  Creatine stable for now. BMP reviewed- noted Estimated Creatinine Clearance: 81.2 mL/min (based on SCr of 0.79 mg/dL). according to latest data. Based on current GFR, CKD stage is stage 3 - GFR 30-59.  Monitor UOP and serial BMP and adjust therapy as needed. Renally dose meds. Avoid nephrotoxic medications and procedures.    Essential (primary) hypertension  Chronic, controlled. Latest blood pressure and vitals reviewed-     Temp:  [97.9 °F (36.6 °C)-99.2 °F (37.3 °C)]   Pulse:  [103-119]   Resp:  [14-20]   BP: (112-149)/(65-84)   SpO2:  [95 %-100 %] .   Home meds for hypertension were reviewed and noted below.   Hypertension Medications               amLODIPine (NORVASC) 5 MG tablet Take 1 tablet (5 mg total) by mouth once daily.            While in the hospital, will manage blood pressure as follows; Continue home antihypertensive regimen    Will utilize p.r.n. blood pressure medication only if patient's blood pressure greater than 180/110 and he develops symptoms such as worsening chest pain or shortness of breath.    History of stroke  Residual right sided weakness.   Follows with Dr. Sanchez.   Resume home statin.   Holding aspirin given concern for GI bleed.         VTE Risk Mitigation (From admission, onward)           Ordered     IP VTE HIGH RISK PATIENT  Once         02/03/24 1606     Place sequential compression device  Until discontinued         02/03/24 1606                    Discharge Planning   HEATHER: 2/8/2024     Code Status: DNR   Is the patient medically ready for discharge?:     Reason for patient still in hospital (select all that apply): Pending disposition  Discharge Plan A: Skilled Nursing Facility              PRESTON PADILLA MD  Department of Hospital Medicine   [facility]

## 2024-02-07 NOTE — PROGRESS NOTES
[facility]  Pulmonology  Progress Note    Patient Name: Steven Campo  MRN: [id number]  Admission Date: 2/3/2024  Hospital Length of Stay: 4 days  Code Status: DNR  Attending Provider: Rafael Cruz MD  Primary Care Provider: Dawn Dumont FNP   Principal Problem: Generalized weakness    Subjective:     Interval History:  As an hospital course      Objective:     Vital Signs (Most Recent):  Temp: 98.2 °F (36.8 °C) (02/07/24 0851)  Pulse: (!) 113 (02/07/24 0851)  Resp: 14 (02/07/24 0851)  BP: 121/74 (02/07/24 0851)  SpO2: 99 % (02/07/24 0851) Vital Signs (24h Range):  Temp:  [98.2 °F (36.8 °C)-99.2 °F (37.3 °C)] 98.2 °F (36.8 °C)  Pulse:  [103-115] 113  Resp:  [14-20] 14  SpO2:  [95 %-99 %] 99 %  BP: (112-129)/(65-78) 121/74     Weight: 62.4 kg (137 lb 9.1 oz)  Body mass index is 19.19 kg/m².      Intake/Output Summary (Last 24 hours) at 2/7/2024 0923  Last data filed at 2/6/2024 2304  Gross per 24 hour   Intake --   Output 650 ml   Net -650 ml        Physical Exam  Constitutional:       General: He is not in acute distress.     Appearance: Normal appearance. He is normal weight. He is not ill-appearing or diaphoretic.   HENT:      Head: Normocephalic and atraumatic.      Nose: No congestion or rhinorrhea.      Mouth/Throat:      Mouth: Mucous membranes are moist.   Cardiovascular:      Rate and Rhythm: Normal rate and regular rhythm.      Pulses: Normal pulses.      Heart sounds: Normal heart sounds.   Pulmonary:      Effort: Pulmonary effort is normal. No respiratory distress.      Breath sounds: No stridor. No wheezing, rhonchi or rales.      Comments:   Decreased breath sounds right hemithorax  Chest:      Chest wall: No tenderness.   Abdominal:      General: Abdomen is flat.   Musculoskeletal:      Cervical back: Normal range of motion. No rigidity.      Right lower leg: No edema.      Left lower leg: No edema.   Skin:     General: Skin is warm.      Findings: No erythema.    Neurological:      General: No focal deficit present.      Mental Status: He is alert and oriented to person, place, and time. Mental status is at baseline.   Psychiatric:         Mood and Affect: Mood normal.         Behavior: Behavior normal.         Thought Content: Thought content normal.           Review of Systems    Vents:       Lines/Drains/Airways       Peripheral Intravenous Line  Duration                  Peripheral IV - Single Lumen 02/06/24 1655 22 G Posterior;Right Forearm <1 day                    Significant Labs:    CBC/Anemia Profile:  Recent Labs   Lab 02/05/24  1405 02/06/24  0510 02/07/24  0356   WBC  --  13.51* 13.72*   HGB 8.0* 7.6* 7.3*   HCT 25.2* 25.2* 22.7*   PLT  --  324 356   MCV  --  96.9* 89.7   RDW  --  17.2* 16.4*        Chemistries:  Recent Labs   Lab 02/06/24  0616 02/07/24  0356   * 135*   K 3.8 3.4*    103   CO2 27 28   BUN 14 10   CREATININE 0.74 0.79   CALCIUM 8.9 8.7   ALBUMIN 1.6* 1.5*   PROT 6.3* 6.3*   BILITOT 0.2 0.2   ALKPHOS 120* 103   ALT 17 23   AST 40* 45*       All pertinent labs within the past 24 hours have been reviewed.  Hemoglobin stable and platelets within normal limits for today's procedure    Significant Imaging:  I have reviewed all pertinent imaging results/findings within the past 24 hours.  Assessment/Plan:     Pulmonary  Pleural effusion   He has a large right-sided pleural effusion which is likely in the setting of compressive atelectasis from his endobronchial disease versus malignant pleural effusion.  He also has a mediastinal shift.  When compared to his initial CT from November 2022, he had a very similar obstruction of the bronchus intermedius.  At this time his endobronchial obstruction appears to be very similar however he does have increasing pleural effusion which may be in the setting of compressive atelectasis.    He did have evidence of endobronchial disease during his bronchoscopy in February 2023.    Given that he is  minimally symptomatic at this time, stenting the bronchus intermedius is unlikely to be of any additional benefit.  He is yet to complete his radiation treatment at this time and was most recently on systemic treatment for his lung cancer.  His mild dyspnea may be improved by helping with his pleural effusion (which may benefit by placement of PleurX catheter-although there are significant concerns that he may have trapped lung physiology given his mass and the lung may not expand fully).    Update 2/7/24-I discussed in detail with him in his sister and there are significant logistical challenges (the patient returning home alone after his expected nursing home/swing bed placement and no significant help from family).  In this situation we will drain the effusion today using a chest tube (given the obstruction the lung may not expand fully and chest tube with a allow gradual drainage of the fluid).    Recommendations   Continue to monitor hemoglobin/hematocrit and coagulation studies to ensure stabilization  We will plan to perform airway exam with bronchoalveolar lavage (for concern regarding postobstructive pneumonia) under general anesthesia today with placement of way pneumothorax 14 [place] pigtail catheter in the right pleural space          The risks of bronchoscopy, and right-sided tube thoracostomy (chest tube) were discussed with the patient and his sister Chandrika in detail, including the alternatives to these procedures.  Risks discussed included, but not limited to, bleeding, infection, injury to the lung, pneumothorax, worsening shortness of breath and respiratory failure.  The patient/ their family verbalized understanding of this risk and agreed to proceed with the procedure as above.      This is a high-risk procedure given the patient's mass with a higher chance endobronchial bleeding.  The patient and his family were made aware of this and they would like to proceed with the bronchoscopy and chest  tube placement.      The patient's DNR status will be reversed temporarily for this procedure.        Gonzalo Robert MD  Interventional Pulmonary and Critical Care  [facility]

## 2024-02-07 NOTE — ASSESSMENT & PLAN NOTE
CT L-spine showed acute pathologic L1 compression fracture with mild retropulsion, no spinal stenosis reported.  MRI lumbar spine showed 1.1 cm bony fragment or soft tissue located within the spinal canal. Acute severely comminuted compression fracture of L1 associated with bone marrow edema. Furthermore there is 0.7 cm of retropulsion of the superior endplate of L1 contributing to severe spinal canal narrowing.  Ortho-spine (Dr. Ceballos) consulted for opinion on MRI findings, recommend no surgical intervention needed at this time, recommend a TLSO brace for comfort.  He can follow-up in ortho-spine clinic 2 weeks after discharge from the hospital    Pain control.

## 2024-02-07 NOTE — ASSESSMENT & PLAN NOTE
Patient found to have large pleural effusion on imaging.   I have personally reviewed and interpreted the following imaging: Xray and CT chest.   Most likely etiology includes metastatic lung cancer.   Discussed with Pulm- Dr. Nugent, hold on thoracentesis since there is right mainstem bronchus obstruction from the tumor and the lung is not likely to expand after thoracentesis, and patient is stable from respiratory stand point.  Discussed with interventional Pulmonologist- Dr. Revees, s/p airway exam/BAL (2/7), large endobronchial mass noted with complete obstruction of right lung, PleurX catheter not placed as it would not benefit the patient. Recommend to monitor symptoms.

## 2024-02-07 NOTE — PT/OT/SLP PROGRESS
Physical Therapy      Patient Name:  Steven Campo   MRN:  [id number]    Patient not seen today secondary to Testing/imaging (xray/CT/MRI), Off the floor for procedure/surgery, Patient fatigue (in AM, pt gone for bronch and testing. In PM, pt fatigued from morning procedures.). Will follow-up 2/8/24.

## 2024-02-07 NOTE — ANESTHESIA PREPROCEDURE EVALUATION
02/07/2024  Steven Campo is a 68 y.o., male.      Pre-op Assessment    I have reviewed the Patient Summary Reports.     I have reviewed the Nursing Notes. I have reviewed the NPO Status.   I have reviewed the Medications.     Review of Systems  Anesthesia Hx:             Denies Family Hx of Anesthesia complications.    Denies Personal Hx of Anesthesia complications.                    Social:  Non-Smoker, No Alcohol Use       Hematology/Oncology:    Oncology Normal    -- Anemia:                                  EENT/Dental:  EENT/Dental Normal           Cardiovascular:     Hypertension           hyperlipidemia   ECG has been reviewed.                          Pulmonary:   COPD                     Renal/:  Chronic Renal Disease, CKD                Hepatic/GI:  Hepatic/GI Normal                 Musculoskeletal:  Musculoskeletal Normal                Neurological:   CVA    Seizures                                Endocrine:  Endocrine Normal            Dermatological:  Skin Normal    Psych:  Psychiatric Normal                    Physical Exam  General: Well nourished, Cooperative, Alert and Oriented    Airway:  Mallampati: II / II  Mouth Opening: Normal  TM Distance: Normal  Neck ROM: Normal ROM    Dental:  Intact    Chest/Lungs:  Clear to auscultation    Heart:  Rate: Normal  Rhythm: Regular Rhythm  Sounds: Normal        Chemistry        Component Value Date/Time     (L) 02/07/2024 0356    K 3.4 (L) 02/07/2024 0356     02/07/2024 0356    CO2 28 02/07/2024 0356    BUN 10 02/07/2024 0356    CREATININE 0.79 02/07/2024 0356    GLU 89 02/07/2024 0356        Component Value Date/Time    CALCIUM 8.7 02/07/2024 0356    ALKPHOS 103 02/07/2024 0356    AST 45 (H) 02/07/2024 0356    ALT 23 02/07/2024 0356    BILITOT 0.2 02/07/2024 0356    EGFRNONAA 40 (L) 07/21/2022 1438        Lab Results   Component Value  Date    WBC 13.72 (H) 02/07/2024    HGB 7.3 (L) 02/07/2024    HCT 22.7 (L) 02/07/2024     02/07/2024     Results for orders placed or performed during the hospital encounter of 02/03/24   EKG 12-lead    Collection Time: 02/03/24  4:32 PM   Result Value Ref Range    QRS Duration 56 ms    [facility] QTC Calculation 430 ms    Narrative    Test Reason : R00.0,    Vent. Rate : 113 BPM     Atrial Rate : 113 BPM     P-R Int : 166 ms          QRS Dur : 056 ms      QT Int : 314 ms       P-R-T Axes : 074 057 071 degrees     QTc Int : 430 ms    Sinus tachycardia  Low voltage QRS  Borderline Abnormal ECG  No previous ECGs available  Confirmed by Misael FELDMAN, Sherly MCDERMOTT ([id number]) on 2/5/2024 10:20:26 PM    Referred By: [email or web address]   SELF           Confirmed By:Sherly Dougals MD         Anesthesia Plan  Type of Anesthesia, risks & benefits discussed:    Anesthesia Type: Gen ETT  Intra-op Monitoring Plan: Standard ASA Monitors  Post Op Pain Control Plan: multimodal analgesia  Induction:  IV  Airway Plan: Direct  Informed Consent: Informed consent signed with the Patient and all parties understand the risks and agree with anesthesia plan.  All questions answered.   ASA Score: 3  Day of Surgery Review of History & Physical: H&P Update referred to the surgeon/provider.I have interviewed and examined the patient. I have reviewed the patient's H&P dated: There are no significant changes.   Anesthesia Plan Notes: Patient requested that DNR status be lifted.      Ready For Surgery From Anesthesia Perspective.     .

## 2024-02-07 NOTE — PT/OT/SLP PROGRESS
Occupational Therapy      Patient Name:  Steven Campo   MRN:  [id number]    Patient not seen today secondary to pt's request. Pt reports not feeling well and has back pain. Pt unavailable in am due to procedure. Will follow-up 2/8/24.    2/7/2024

## 2024-02-08 NOTE — ASSESSMENT & PLAN NOTE
Residual right sided weakness.   Follows with Dr. Sanchez.   Resume home statin.   Discontinued home aspirin.

## 2024-02-08 NOTE — ASSESSMENT & PLAN NOTE
Patient's anemia is currently controlled. Received 1 unit PRBC on 2/5. Etiology likely d/t chronic disease due to Malignancy and chemotherapy.  Baseline Hgb prior to being diagnosed with malignancy was ~ 12.   Admission Hgb 8.6.   Current CBC reviewed-   Lab Results   Component Value Date    HGB 7.1 (L) 02/08/2024    HCT 22.8 (L) 02/08/2024     B12/folate WNL, LDH/haptoglobin NOT suggestive of hemolysis, iron panel consistent with anemia of chronic disease.   Stool for occult blood is positive, no active bleeding noted.  Underwent colonoscopy (screening) in 2022 but prep was poor and patient was supposed to repeat colonoscopy in 1 year but has not gotten a chance yet.  Hgb dropped to 6.7 on 2/5, GI consulted and recommend to monitor Hgb without endoscopic evaluation.  Discontinued home aspirin.   Monitor serial CBC and transfuse if patient becomes hemodynamically unstable, symptomatic or H/H drops below 7/21.

## 2024-02-08 NOTE — ASSESSMENT & PLAN NOTE
This patient does have evidence of infective focus  My overall impression is sepsis, this is improving.   Source: Respiratory  Antibiotics given-   Antibiotics (72h ago, onward)      Start     Stop Route Frequency Ordered    02/04/24 1600  cefTRIAXone (ROCEPHIN) 2 g in dextrose 5 % in water (D5W) 100 mL IVPB (MB+)         02/10/24 1559 IV Every 24 hours (non-standard times) 02/03/24 1606          Oxygenation stable on room air.   Source control achieved by: antibiotics (ceftriaxone x 7 days- oral if discharged before, and azithromycin x 5 days)- covering broader given recent chemo and underlying lung cancer possibly with post obstruction.   Follow BAL cultures, NGTD.   Vancomycin discontinued given MRSA PCR negative.

## 2024-02-08 NOTE — PLAN OF CARE
[facility]  Discharge Final Note    Primary Care Provider: Dawn Dumont FNP    Expected Discharge Date: 2/8/2024    Final Discharge Note (most recent)       Final Note - 02/08/24 1014          Final Note    Assessment Type Final Discharge Note     Anticipated Discharge Disposition Swing Bed        Post-Acute Status    Post-Acute Authorization Placement     Post-Acute Placement Status Set-up Complete/Auth obtained     Patient choice form signed by patient/caregiver List with quality metrics by geographic area provided;List from CMS Compare;List from System Post-Acute Care     Discharge Delays None known at this time                     Important Message from Medicare  Important Message from Medicare regarding Discharge Appeal Rights: Given to patient/caregiver, Explained to patient/caregiver, Signed/date by patient/caregiver     Date IMM was signed: 02/09/24  Time IMM was signed: 1000     Follow-up providers       Ghanshyam Ceballos MD   Specialty: Orthopedic Surgery, Spine Surgery    [address] [place] [address] [facility] [address]   Phone: [phone]       Next Steps: Schedule an appointment as soon as possible for a visit in 2 week(s)    Instructions: Please follow up on February 23 at 7:45              After-discharge care                Destination       [facility]   Service: Skilled Nursing    [address] [place] [id number]   Phone: [phone]                               Pt to dc to [facility] this day. 0 further dc needs. Packet delivered to floor.

## 2024-02-08 NOTE — DISCHARGE SUMMARY
"[facility] Hospital Medicine  Discharge Summary      Patient Name: Steven Campo  MRN: [id number]  RUBY: [id number]  Patient Class: IP- Inpatient  Admission Date: 2/3/2024  Hospital Length of Stay: 5 days  Discharge Date and Time:  02/08/2024 11:11 AM  Attending Physician: Rafael Cruz MD   Discharging Provider: RAFAEL CRUZ MD  Primary Care Provider: Dawn Dumont FNP    Primary Care Team: Networked reference to record PCT     HPI:   Mr. Campo is a 68 Y O male with PMH of Lung cancer (stage 4, with mets to spine) s/p chemo, currently undergoing radiation, HTN, history of CVA with residual right hemiplegia, BPH, tobacco dependence, who presented to ED via EMS with fall and generalized weakness. Patient reports since last 2 days he has gotten progressively weak where his legs will "give out" whenever he tries to walk. He slid down to the floor from the couch last night, unable to get up. Sister called EMS. Patient reports productive cough with brownish sputum and some exertional shortness of breath. No reports of fever, chills, ill contacts. Patient denies chest pain, leg swelling, numbness in lower extremities, bowel or bladder incontinence. He denies head trauma, seizure like activity or LOC. He has generally gotten weaker since his diagnosis of lung cancer exactly a year ago and especially since completing chemotherapy 3 weeks prior to admission. He was started on radiation therapy for his mets to spine, and he has undergone 4 out of 6 sessions. He follows with Dr. Vazquez for his lung cancer.     * No surgery found *      Hospital Course:   2/4: Pulmonology evaluated, recommend no thoracentesis until right bronchus obstruction is relieved as the lung is not likely to expand. Will run it by interventional Pulm for options.   2/5: D/W Dr. Robert (interventional Pulm), plan for PleurX catheter and bronch with BAL on 2/7. GI consulted given drop in Hgb- they recommend to monitor " Hgb without endoscopic evaluation for now.   2/7: Hgb with slight drop but overall stable. S/p bronch, interventional Pulm decided not to insert PleurX given large endobronchial mass and would not gain benefit from it. BAL sent. SNF placement in progress. Ortho-spine consulted given compression fracture and abnormal MRI L-spine, recommend no surgical intervention, TLSO brace recommended.   2/8: Hgb overall stable without active/overt bleeding. Discharging to SNF today.      Goals of Care Treatment Preferences:  Code Status: DNR      Consults:   Consults (From admission, onward)          Status Ordering Provider     Inpatient consult to Orthopedic Surgery  Once        Provider:  Ghanshyam Ceballos MD    Completed PRESTON PADILLA     Inpatient consult to Social Work  Once        Provider:  (Not yet assigned)    Completed PRESTON PADILLA     Inpatient consult to Gastroenterology  Once        Provider:  Marko Hammond MD    Completed PRESTON PADILLA     Inpatient consult to Pulmonology  Once        Provider:  Benito Nugent MD    Completed PRESTON PADILLA            Neuro  History of stroke  Residual right sided weakness.   Follows with Dr. Sanchez.   Resume home statin.   Discontinued home aspirin.       Closed compression fracture of L1 vertebra  CT L-spine showed acute pathologic L1 compression fracture with mild retropulsion, no spinal stenosis reported.  MRI lumbar spine showed 1.1 cm bony fragment or soft tissue located within the spinal canal. Acute severely comminuted compression fracture of L1 associated with bone marrow edema. Furthermore there is 0.7 cm of retropulsion of the superior endplate of L1 contributing to severe spinal canal narrowing.  Ortho-spine (Dr. Ceballos) consulted for opinion on MRI findings, recommend no surgical intervention needed at this time, recommend a TLSO brace for comfort.  He can follow-up in ortho-spine clinic 2 weeks after discharge from the hospital    Pain control.        Pulmonary  Mass of right lung  Plan as outlined above.       Pleural effusion  Patient found to have large pleural effusion on imaging.   I have personally reviewed and interpreted the following imaging: Xray and CT chest.   Most likely etiology includes metastatic lung cancer.   Discussed with Pulm- Dr. Nugent, hold on thoracentesis since there is right mainstem bronchus obstruction from the tumor and the lung is not likely to expand after thoracentesis, and patient is stable from respiratory stand point.  Discussed with interventional Pulmonologist- Dr. Reeves, s/p airway exam/BAL (2/7), large endobronchial mass noted with complete obstruction of right lung, PleurX catheter not placed as it would not benefit the patient. Recommend to monitor symptoms.   Dr. Robert to follow up in 4 weeks as outpatient.     Cardiac/Vascular  Essential (primary) hypertension  Chronic, controlled. Latest blood pressure and vitals reviewed-     Temp:  [97.9 °F (36.6 °C)-98.7 °F (37.1 °C)]   Pulse:  [102-119]   Resp:  [12-20]   BP: (112-149)/(63-84)   SpO2:  [94 %-100 %] .   Home meds for hypertension were reviewed and noted below.   Hypertension Medications               amLODIPine (NORVASC) 5 MG tablet Take 1 tablet (5 mg total) by mouth once daily.            While in the hospital, will manage blood pressure as follows; Continue home antihypertensive regimen    Will utilize p.r.n. blood pressure medication only if patient's blood pressure greater than 180/110 and he develops symptoms such as worsening chest pain or shortness of breath.    Renal/  Benign prostatic hyperplasia with urinary frequency  Resume home finasteride.       Stage 3b chronic kidney disease  Creatine stable for now. BMP reviewed- noted Estimated Creatinine Clearance: 91.6 mL/min (based on SCr of 0.7 mg/dL). according to latest data. Based on current GFR, CKD stage is stage 3 - GFR 30-59.  Monitor UOP and serial BMP and adjust therapy as needed. Renally dose  meds. Avoid nephrotoxic medications and procedures.    ID  Sepsis due to pneumonia  This patient does have evidence of infective focus  My overall impression is sepsis, this is improving.   Source: Respiratory  Antibiotics given-   Antibiotics (72h ago, onward)      Start     Stop Route Frequency Ordered    02/04/24 1600  cefTRIAXone (ROCEPHIN) 2 g in dextrose 5 % in water (D5W) 100 mL IVPB (MB+)         02/10/24 1559 IV Every 24 hours (non-standard times) 02/03/24 1606          Oxygenation stable on room air.   Source control achieved by: antibiotics (ceftriaxone x 7 days- oral if discharged before, and azithromycin x 5 days)- covering broader given recent chemo and underlying lung cancer possibly with post obstruction.   Follow BAL cultures, NGTD.   Vancomycin discontinued given MRSA PCR negative.     Oncology  Normocytic anemia  Patient's anemia is currently controlled. Received 1 unit PRBC on 2/5. Etiology likely d/t chronic disease due to Malignancy and chemotherapy.  Baseline Hgb prior to being diagnosed with malignancy was ~ 12.   Admission Hgb 8.6.   Current CBC reviewed-   Lab Results   Component Value Date    HGB 7.1 (L) 02/08/2024    HCT 22.8 (L) 02/08/2024     B12/folate WNL, LDH/haptoglobin NOT suggestive of hemolysis, iron panel consistent with anemia of chronic disease.   Stool for occult blood is positive, no active bleeding noted.  Underwent colonoscopy (screening) in 2022 but prep was poor and patient was supposed to repeat colonoscopy in 1 year but has not gotten a chance yet.  Hgb dropped to 6.7 on 2/5, GI consulted and recommend to monitor Hgb without endoscopic evaluation.  Discontinued home aspirin.   Monitor serial CBC and transfuse if patient becomes hemodynamically unstable, symptomatic or H/H drops below 7/21.    Malignant neoplasm of right lung  In 11/2022 patient had CXR concerning for right hilar mass, CT chest confirmed the mass with lymph nodes suggestive of malignancy.   S/p  bronchoscopy with biopsy of the mass (2/2023), path showed poorly differentiated adenocarcinoma.  Stage 4 with mets to spine.   CT cervical spine consistent with C2 metastatic lesion.   S/p chemotherapy completion 3 weeks ago and currently receiving radiation therapy for spinal mets (4 out of 6 sessions completed).   Follows with Dr. Vazquez.       Palliative Care  DNR (do not resuscitate)  Discussed code status on admission, in the presence of ED RN.   Confirms DNR and do not intubate.   Prognosis is guarded given multiple mets/poorly differentiated lung cancer, consider discussing hospice.       Other  * Generalized weakness  Presented with fall onto bottom, inability to ambulate x 2 days PTA.   Suspect overall debility due to malignancy, chemotherapy.   Known right sided weakness, no red flags on clinical exam.  CT T/L spine with metastatic disease and compression fracture respectively.   PT/OT consulted and followed.     Tobacco dependence  Dangers of cigarette smoking were reviewed with patient in detail. Patient was Referred to Tobacco Cessation Program. Nicotine replacement options were discussed. Nicotine replacement was discussed- prescribed      Final Active Diagnoses:    Diagnosis Date Noted POA    PRINCIPAL PROBLEM:  Generalized weakness [R53.1] 02/03/2024 Yes    Sepsis due to pneumonia [J18.9, A41.9] 02/03/2024 Yes    Pleural effusion [J90] 02/03/2024 Yes    Malignant neoplasm of right lung [C34.91] 03/06/2023 Yes    Closed compression fracture of L1 vertebra [S32.010A] 02/04/2024 Yes    Benign prostatic hyperplasia with urinary frequency [N40.1, R35.0] 02/03/2024 Yes    Normocytic anemia [D64.9] 02/03/2024 Yes    DNR (do not resuscitate) [Z66] 02/03/2024 Yes    Tobacco dependence [F17.200] 02/03/2024 Yes    Mass of right lung [R91.8] 10/27/2022 Yes    Stage 3b chronic kidney disease [N18.32]  Yes     Chronic    Essential (primary) hypertension [I10] 07/21/2022 Yes     Chronic    History of stroke [Z86.73]  2015 Not Applicable      Problems Resolved During this Admission:       Discharged Condition: fair    Disposition: Skilled Nursing Facility    Follow Up:   Contact information for follow-up providers       Ghanshyam Ceballos MD. Schedule an appointment as soon as possible for a visit in 2 week(s).    Specialties: Orthopedic Surgery, Spine Surgery  Why: Please follow up on February 23 at 7:45  Contact information:  [address] [facility] [address]  [phone]                       Contact information for after-discharge care       Destination       [facility] .    Service: Skilled Nursing  Contact information:  [address] [facility] [address]  [phone]                                 Patient Instructions:      Reason for not Ordering Smoking Cessation Referral     Order Specific Question Answer Comments   Reason for not ordering: Patient refused        Significant Diagnostic Studies: Labs: BMP:   Recent Labs   Lab 02/07/24  0356 02/08/24  0400   GLU 89 105   * 134*   K 3.4* 3.9    103   CO2 28 29   BUN 10 12   CREATININE 0.79 0.70   CALCIUM 8.7 8.5    and CBC   Recent Labs   Lab 02/07/24  0356 02/08/24  0601   WBC 13.72* 13.07*   HGB 7.3* 7.1*   HCT 22.7* 22.8*    310       Pending Diagnostic Studies:       Procedure Component Value Units Date/Time    EXTRA TUBES [[id number]] Collected: 02/06/24 0616    Order Status: Sent Lab Status: In process Updated: 02/06/24 0633    Specimen: Blood, Venous     Narrative:      The following orders were created for panel order EXTRA TUBES.  Procedure                               Abnormality         Status                     ---------                               -----------         ------                     Lavender Top Hold[[id number]]                               In process                   Please view results for these tests on the individual orders.            Medications:  Reconciled Home Medications:      Medication List        START taking these medications      cefdinir 300 MG capsule  Commonly known as: OMNICEF  Take 1 capsule (300 mg total) by mouth 2 (two) times daily. for 2 days     nicotine 14 mg/24 hr  Commonly known as: NICODERM CQ  Place 1 patch onto the skin once daily.            CONTINUE taking these medications      albuterol 90 mcg/actuation inhaler  Commonly known as: VENTOLIN HFA  Inhale 2 puffs into the lungs every 4 (four) hours as needed for Wheezing or Shortness of Breath. Rescue     albuterol-ipratropium 2.5 mg-0.5 mg/3 mL nebulizer solution  Commonly known as: DUO-NEB  Take 3 mLs by nebulization every 6 (six) hours as needed for Wheezing or Shortness of Breath. Rescue     amLODIPine 5 MG tablet  Commonly known as: NORVASC  Take 1 tablet (5 mg total) by mouth once daily.     atorvastatin 40 MG tablet  Commonly known as: LIPITOR  Take 1 tablet (40 mg total) by mouth once daily.     finasteride 5 mg tablet  Commonly known as: PROSCAR  Take 5 mg by mouth once daily.     HYDROcodone-acetaminophen  mg per tablet  Commonly known as: NORCO  Take 1 tablet by mouth every 6 (six) hours as needed for Pain.     mometasone 200 mcg/actuation Hfaa  Commonly known as: ASMANEX HFA  Inhale 2 puffs into the lungs 2 (two) times a day. Controller            STOP taking these medications      aspirin 81 mg Cap     fentaNYL 12 mcg/hr Pt72  Commonly known as: DURAGESIC              Indwelling Lines/Drains at time of discharge:   Lines/Drains/Airways       None                   Time spent on the discharge of patient: 35 minutes         PRESTON PADILLA MD  Department of Hospital Medicine  [facility]

## 2024-02-08 NOTE — ASSESSMENT & PLAN NOTE
Presented with fall onto bottom, inability to ambulate x 2 days PTA.   Suspect overall debility due to malignancy, chemotherapy.   Known right sided weakness, no red flags on clinical exam.  CT T/L spine with metastatic disease and compression fracture respectively.   PT/OT consulted and followed.

## 2024-02-08 NOTE — ASSESSMENT & PLAN NOTE
Creatine stable for now. BMP reviewed- noted Estimated Creatinine Clearance: 91.6 mL/min (based on SCr of 0.7 mg/dL). according to latest data. Based on current GFR, CKD stage is stage 3 - GFR 30-59.  Monitor UOP and serial BMP and adjust therapy as needed. Renally dose meds. Avoid nephrotoxic medications and procedures.

## 2024-02-08 NOTE — ASSESSMENT & PLAN NOTE
Chronic, controlled. Latest blood pressure and vitals reviewed-     Temp:  [97.9 °F (36.6 °C)-98.7 °F (37.1 °C)]   Pulse:  [102-119]   Resp:  [12-20]   BP: (112-149)/(63-84)   SpO2:  [94 %-100 %] .   Home meds for hypertension were reviewed and noted below.   Hypertension Medications               amLODIPine (NORVASC) 5 MG tablet Take 1 tablet (5 mg total) by mouth once daily.            While in the hospital, will manage blood pressure as follows; Continue home antihypertensive regimen    Will utilize p.r.n. blood pressure medication only if patient's blood pressure greater than 180/110 and he develops symptoms such as worsening chest pain or shortness of breath.

## 2024-02-08 NOTE — ASSESSMENT & PLAN NOTE
----- Message from SILAS Logan MD sent at 11/20/2023  3:46 PM EST -----  OR after Cardiology eval (Dr. Mendoza)   Plan as outlined above.

## 2024-02-08 NOTE — ASSESSMENT & PLAN NOTE
Patient found to have large pleural effusion on imaging.   I have personally reviewed and interpreted the following imaging: Xray and CT chest.   Most likely etiology includes metastatic lung cancer.   Discussed with Pulm- Dr. Nugent, hold on thoracentesis since there is right mainstem bronchus obstruction from the tumor and the lung is not likely to expand after thoracentesis, and patient is stable from respiratory stand point.  Discussed with interventional Pulmonologist- Dr. Reeves, s/p airway exam/BAL (2/7), large endobronchial mass noted with complete obstruction of right lung, PleurX catheter not placed as it would not benefit the patient. Recommend to monitor symptoms.   Dr. Robert to follow up in 4 weeks as outpatient.

## 2024-02-12 NOTE — PROGRESS NOTES
Procedures   UT US CHEST / UPPER BACK [90661 (CPT®)]            Thoracic ultrasound procedure note      A thoracic ultrasound was performed at bedside today.  The right pleural space was investigated using the phased array ultrasound probe.       Findings:  Minimal amount of pleural effusion present.  No evidence of septations.  Significant areas of compressive atelectasis and soft tissue mass observed in both lateral and posterior rib spaces via ultrasound.  No safe window to place PleurX catheter.     Images as below.      Status post bronchoscopy with near complete obstruction of right bronchial tree with endobronchial mass and observation of pleural space with ultrasound, the patient may not benefit from placement of PleurX catheter as there is significant doubt that the patient would have lung expansion with the extent of both endobronchial tumor and extent of tumor seen on the ultrasound and CT.    We will follow the patient up in two weeks' time with a chest x-ray prior to see if there has an increase in the effusion which time we will start off with performing a thoracentesis before considering a PleurX catheter (based on the patient's symptoms and imaging at the time).  Have conveyed to our hospital medicine colleagues at the patient can be seen earlier or pulmonology can be consulted earlier if/as needed.     Gonzalo Robert MD  Interventional Pulmonary and Critical Care  [facility]

## 2024-02-23 NOTE — PROGRESS NOTES
AP, lateral views of the lumbar spine reviewed    On the AP there is normal coronal alignment.  There are 5 non-rib-bearing lumbar vertebrae.  On the lateral there is decreased lumbar lordosis.  There is spondylotic disease with decreased disc height and osteophyte formation noted.  Grade 1 anterolisthesis at L4-5.  There is a pathologic fracture at L1 with kyphotic alignment.    Impression:  Spondylotic changes of the lumbar spine as noted above

## 2024-02-26 NOTE — PROGRESS NOTES
I have already discussed the findings of this x-ray with the patient and his family during his in-person visit.  Since the patient is clinically the same with no significant evidence of respiratory distress or dysfunction, waiting and watching at this time as per shared decision-making with the patient in his family.

## 2024-03-19 NOTE — H&P (VIEW-ONLY)
She Disclaimer: This note has been generated using voice-recognition software. There may be typographical errors that have been missed during proof-reading        Patient ID: Steven Campo is a 68 y.o. male.      Chief Complaint: Low-back Pain      68-year-old male presents for new  patient evaluation and treatment of intractable lower back pain.  He was diagnosed with lung cancer several years ago and was treated with chemotherapy and radiation.  His lower back pain worsened over the past several months  and he developed weakness of the lower extremities, difficulty standing and ambulating. He denies bowel or bladder involvement.  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  He declined surgical intervention  with Dr. Ceballos. [place] is providing inadequate pain relief.  We discussed a change of medication for better pain relief.  A vertebroplasty was also recommended but due to the mild retropulsion there are relative  contraindications.  I will perform an L1-L2 epidural steroid injection and if his pain remains intractable then we will consider a possible vertebroplasty. The lumbar brace failed to provide any significant improvement.  Diagnostic imagings were reviewed and discussed with patient.            Pain Assessment  Pain Assessment: 0-10  Pain Score:   8  Pain Location: Back  Pain Descriptors: Aching  Pain Frequency: Constant/continuous  Pain Onset: Awakened from sleep  Aggravating Factors: Other (Comment) (getting up from sitting position)  Pain Intervention(s): Medication (See eMAR), Home medication      A's of Opioid Risk Assessment  Activity:Patient is unable to perform ADL.   Analgesia:Patients pain is not controlled by current medication.   Adverse Effects: Patient denies constipation or sedation.  Aberrant Behavior:  reviewed with no aberrant drug seeking/taking behavior.      Patient denies any suicidal or homicidal  ideations    Physical Therapy/Home Exercise:  Unable to participate in physical therapy.  Completed physical therapy at nursing home without improvement     CT Chest With Contrast  Narrative: EXAMINATION:  CT CHEST WITH CONTRAST    CLINICAL HISTORY:  Pleural effusion, not elsewhere classifiedPleural effusion, malignancy suspected;    TECHNIQUE:  Multiplanar CT of the chest with intravenous contrast.  100 cc of Isovue 370.    Dose reduction: The CT exam was performed using one or more dose reduction techniques: Automatic exposure control, automated adjustment of the MA and/or kVP according to patient size, or use of iterative reconstruction technique.    COMPARISON:  2/20/24    FINDINGS:  Lower neck: Normal    Lungs/airway: Right middle lobe/right lower lobe atelectasis. Moderate right-sided pleural effusion with Hounsfield units of 5.  Multiple nodular densities located about the right lower pleura with the largest nodular density measuring 5.4 x 3.1 cm.  Interval enlargement of multiple metastases scattered throughout the left lung with the largest located along the left major fissure now measuring up to 1.5 cm, previously 1.2 cm.    Mediastinum: Multiple nodular densities adjacent to the right-sided ventricle with the largest measuring up to 2.2 x 1.7 cm, previously measuring 1.4 cm.    Upper abdomen: Left-sided adrenal nodule measures 5.5 x 7.2 cm, grossly similar to comparison.  Right-sided adrenal nodule measures 4.5 x 3.9 cm, grossly similar to comparison.  The right adrenal gland lesion extends 1.6 cm into the adjacent medial posterior right hepatic lobe, which is new from comparison. Ill-defined hypoattenuating lesion located at the interface of the left/right hepatic lobe measuring 1.5 x 1.5 cm, series 2, image 111.    Chest wall: Normal    Bones: Lytic lesion located within the spinous process of the T8 vertebral body is unchanged from 02/03/2024.  Severely comminuted compression fracture with severe  height loss of the L1 vertebral body.  The height loss has worsened from 02/3/2024.  Impression: Interval enlargement of multiple metastases scattered throughout the left lung with the largest located along the left major fissure now measuring up to 1.5 cm, previously 1.2 cm.    Multiple nodular densities located about the right lower pleura with the largest nodular density measuring 5.4 x 3.1 cm.    Multiple nodular densities adjacent to the right-sided ventricle with the largest measuring up to 2.2 x 1.7 cm, previously measuring 1.4 cm.    Moderate right-sided pleural effusion with Hounsfield units of 5.  Probably related to malignancy.    Ill-defined hypoattenuating lesion located at the interface of the left/right hepatic lobe measuring 1.5 x 1.5 cm, series 2, image 111.  This could represent hepatic metastasis.    Left-sided adrenal nodule measures 5.5 x 7.2 cm, grossly similar to comparison. The right adrenal gland lesion extends 1.6 cm into the adjacent medial posterior right hepatic lobe, which is new from comparison.    Lytic lesion located within the spinous process of the T8 vertebral body is unchanged from 02/03/2024.    Severely comminuted compression fracture with severe height loss of the L1 vertebral body. The height loss has worsened from 02/3/2024.    Electronically signed by: Lars Maradiaga  Date:    03/11/2024  Time:    11:25      Review of Systems   Constitutional: Negative.    HENT: Negative.     Eyes: Negative.    Respiratory: Negative.     Cardiovascular: Negative.    Gastrointestinal: Negative.    Endocrine: Negative.    Genitourinary: Negative.    Musculoskeletal:  Positive for arthralgias, back pain and gait problem.   Integumentary:  Negative.   Allergic/Immunologic: Negative.    Neurological:  Positive for weakness (Bilateral lower extremities) and numbness (Bilateral lower extremities).   Hematological: Negative.    Psychiatric/Behavioral:  Positive for sleep disturbance.               Past Medical History:   Diagnosis Date    Adenocarcinoma of lung     Dx 2/22/23    Chronic bilateral low back pain with right-sided sciatica     Chronic pain of right knee     Essential (primary) hypertension     Hypercholesteremia     Nicotine dependence, cigarettes, uncomplicated     Seizures 2020    last was approx 7 mths ago    Stroke 2015    with residual right sided weakness     Past Surgical History:   Procedure Laterality Date    KNEE ARTHROSCOPY Right 1995     Social History     Socioeconomic History    Marital status: Unknown   Tobacco Use    Smoking status: Former     Current packs/day: 1.00     Average packs/day: 1 pack/day for 44.0 years (44.0 ttl pk-yrs)     Types: Cigarettes     Passive exposure: Current    Smokeless tobacco: Never   Substance and Sexual Activity    Alcohol use: Yes     Comment: approx 12 pk per week & fifth of vodka per week    Drug use: Yes     Types: Marijuana    Sexual activity: Yes     Birth control/protection: Condom     Social Determinants of Health     Financial Resource Strain: Low Risk  (2/5/2024)    Overall Financial Resource Strain (CARDIA)     Difficulty of Paying Living Expenses: Not hard at all   Food Insecurity: No Food Insecurity (2/5/2024)    Hunger Vital Sign     Worried About Running Out of Food in the Last Year: Never true     Ran Out of Food in the Last Year: Never true   Transportation Needs: No Transportation Needs (2/5/2024)    PRAPARE - Transportation     Lack of Transportation (Medical): No     Lack of Transportation (Non-Medical): No   Physical Activity: Inactive (2/5/2024)    Exercise Vital Sign     Days of Exercise per Week: 0 days     Minutes of Exercise per Session: 0 min   Stress: No Stress Concern Present (2/5/2024)    [employer] [place] [employer] - Occupational Stress Questionnaire     Feeling of Stress : Not at all   Social Connections: Moderately Isolated (2/5/2024)    Social Connection and Isolation Panel [NHANES]     Frequency of  Communication with Friends and Family: Three times a week     Frequency of Social Gatherings with Friends and Family: Three times a week     Attends Christian Services: 1 to 4 times per year     Active Member of Clubs or Organizations: No     Attends Club or Organization Meetings: Never     Marital Status:    Housing Stability: Low Risk  (2/5/2024)    Housing Stability Vital Sign     Unable to Pay for Housing in the Last Year: No     Number of Places Lived in the Last Year: 1     Unstable Housing in the Last Year: No     Family History   Problem Relation Age of Onset    Hypertension Mother     Heart disease Mother         mild MI, later in life    Lung cancer Father     Pancreatic cancer Sister     Hyperlipidemia Sister     Hypertension Sister     Kidney failure Sister     Hypertension Sister     Hyperlipidemia Sister     Hypertension Sister     Hyperlipidemia Sister     Hypertension Sister     Hyperlipidemia Sister     Hypertension Sister     No Known Problems Sister     Heart disease Brother     Hypertension Brother     Hypertension Brother     No Known Problems Daughter     No Known Problems Daughter     No Known Problems Maternal Grandmother     No Known Problems Maternal Grandfather     No Known Problems Paternal Grandmother     No Known Problems Paternal Grandfather      Review of patient's allergies indicates:   Allergen Reactions    Salsalate      Other reaction(s): Abdominal pain    Tramadol      Other reaction(s): Seizure     has a current medication list which includes the following prescription(s): acetaminophen, amlodipine, atorvastatin, finasteride, hydrocodone-acetaminophen, albuterol, albuterol-ipratropium, docusate sodium, hydrocodone-acetaminophen, mometasone, nicotine, [START ON 5/19/2024] oxycodone-acetaminophen, and polyethylene glycol.      Objective:  Vitals:    03/20/24 1018   BP: (!) 109/57   Pulse: 103   Resp: 18        Physical Exam  Vitals and nursing note reviewed. Chaperone  present: Accompanied by his sister.   Constitutional:       General: He is not in acute distress.     Appearance: Normal appearance. He is not ill-appearing, toxic-appearing or diaphoretic.   HENT:      Head: Normocephalic and atraumatic.      Nose: Nose normal.      Mouth/Throat:      Mouth: Mucous membranes are moist.   Eyes:      Extraocular Movements: Extraocular movements intact.      Pupils: Pupils are equal, round, and reactive to light.   Cardiovascular:      Rate and Rhythm: Normal rate and regular rhythm.      Heart sounds: Normal heart sounds.   Pulmonary:      Effort: Pulmonary effort is normal. No respiratory distress.      Breath sounds: Normal breath sounds. No stridor. No wheezing or rhonchi.   Abdominal:      General: Bowel sounds are normal.      Palpations: Abdomen is soft.   Musculoskeletal:         General: No swelling or deformity.      Cervical back: Normal and normal range of motion. No spasms or tenderness. No pain with movement. Normal range of motion.      Thoracic back: Normal.      Lumbar back: Spasms, tenderness and bony tenderness present. Decreased range of motion. Negative right straight leg raise test and negative left straight leg raise test. No scoliosis.      Right lower leg: No edema.      Left lower leg: No edema.      Comments: Lumbar pain with flexion, extension and lateral rotation.  Diffuse pain along the thoracic and lumbar spinous and paraspinous muscles to palpation   Skin:     General: Skin is warm.   Neurological:      General: No focal deficit present.      Mental Status: He is alert and oriented to person, place, and time. Mental status is at baseline.      Cranial Nerves: No cranial nerve deficit.      Sensory: Sensory deficit (BLE) present.      Motor: Weakness (BLE) present.      Coordination: Coordination normal.      Gait: Gait abnormal (Requires a wheelchair for mobility).      Deep Tendon Reflexes: Reflexes are normal and symmetric.   Psychiatric:         Mood  and Affect: Mood normal.         Behavior: Behavior normal.           Assessment:      1. Closed compression fracture of L1 lumbar vertebra with routine healing, subsequent encounter    2. Encounter for long-term (current) use of other medications    3. History of stroke    4. Mass of right lung    5. Stage 3b chronic kidney disease    6. Chronic bilateral low back pain with right-sided sciatica    7. Generalized weakness          Plan:  1. reviewed  2.Addiction, Dependency, Tolerance, Opioid abuse-misuse, Death, Diversion Discussed. Overdose reversal drug Naloxone discussed.Patient is prescribed opiates for chronic nonmalignant pain pathology.  Patient is receiving opiates which require greater than a 72 hour supply of therapy.  Patient was educated on potential dependency associated with long-term opioid use as well as decreasing efficacy with prolonged use.  Patient was advised of risks, benefits and side effects and how to utilize each medication.  Patient was also informed that any deviation from therapy protocol will  lead to discontinuation of opiates.  It is reasonable to prescribe opioid analgesics for patient based on positive response to opioid medications, lack of side effects and  limited aberrant behavior.    3.Refill/Continue medications for pain control and function       Requested Prescriptions     Signed Prescriptions Disp Refills    oxyCODONE-acetaminophen (PERCOCET)  mg per tablet 45 tablet 0     Sig: Take 1 tablet by mouth every 8 (eight) hours as needed for Pain.     4. Serum drug test for compliance  5. Schedule L1-2 epidural steroid injection for intractable lower back pain and compression fracture  Orders Placed This Encounter   Procedures    Miscellaneous Test, Sendout [facility] Drug test     Standing Status:   Future     Number of Occurrences:   1     Standing Expiration Date:   5/19/2025     Order Specific Question:   What is the name of the test you wish to perform? (Note: Please  provide the reference lab test code if possible. If you have any questions, call the Lab.)     Answer:   [facility] Drug test    Case Request Operating Room: L1-2 MIAN     Order Specific Question:   Medical Necessity:     Answer:   Medically Non-Urgent [100]     Order Specific Question:   CPT Code:     Answer:   [place] INJ LUMBAR/SACRAL, W/IMAGING GUIDANCE [38530]     Order Specific Question:   Positioning:     Answer:   Prone [1003]     Order Specific Question:   Post-Procedure Disposition:     Answer:   PACU [1]     Order Specific Question:   Estimated Length of Stay:     Answer:   0 midnight     Order Specific Question:   Implant Required:     Answer:   No [1001]     Order Specific Question:   Is an on-site pathologist required for this procedure?     Answer:   N/A      6.Indications for this procedure for this specific patient include the following   -History, physical examination and concordant radiological image based diagnostic testing supports medical necessity for an epidural steroid injection   neurogenic claudication due to central disc pathology, osteophyte complexes, severe degenerative disc disease producing foraminal or central stenosis  - Pt has been in pain for at least 6 weeks and has failed conservative care (e.g. Exercise, physical methods, NSAID/ and or muscle relaxants)   - Pt has no major risk factors for spinal cancer or contraindicated condition   - Neurogenic claudication and Radicular pain are interfering with functional activity  - Pain is associated with symptoms of nerve root irritation   - Any numbness documented is accompanied with paresthesia   - No evidence of systemic or local infection, bleeding tendency or unstable medical condition   - Epidural provided as part of a comprehensive pain management program  - All repeat injections have at least 80% pain relief and increase functional gain and physical activity, and reduction in reliance on the use of medication and or physical therapy  -  Pt has significant functional limitation resulting in diminished quality of life and impaired age appropriate ADL's.   - Diagnostic evaluation has ruled out other causes of pain  - Pt participating in an active rehabilitation program or home exercise program which has been discussed with the patient including heat ice and rest  - No more than 3 epidurals will be done in a 6 month period at the same level with at least 7 days between injections  - MAC is only offered in cases of needle phobia   - Injection done at L1-2 level which is consistent with patient's dermatomal pain complaint    7.Monitored Anesthesia Care medical necessity authorization request:    Monitor anesthesia request is medically indicated for the scheduled nerve block procedure due to:  - needle phobia and anxiety, placing  the patient at risk during the provided service.  -patient is unable to follow simple commands due to mental state  -patient has an ASA class greater than 3 and requires constant presence of an anesthesiologist during the procedure:  -patient has severe problems with muscles and muscle spasticity that makes it hard to lie still  -patient suffers from chronic pain and is unable to function due to  diminished ADLs    8.The planned medically necessary  surgical procedure is performed in a hospital outpatient department and not in an ambulatory surgical center due to:     -there is no geographically assessable ambulatory surgery center that has the  necessary equipment and fluoroscopy needed for the procedure     -there is no geographically assessable ambulatory surgical center available at which the physician has privileges     -an ASC's  specific  guideline regarding the individuals weight or health conditions that prevent the use of an ASC       -injections must be performed under fluoroscopy image guidance with contrast unless the patient has a documented contrast allergy or pregnancy            report:  Reviewed and  consistent with medication use as prescribed.      The total time spent for evaluation and management on 03/20/2024 including reviewing separately obtained history, performing a medically appropriate exam and evaluation, documenting clinical information in the health record, independently interpreting results and communicating them to the patient/family/caregiver, and ordering medications/tests/procedures was between 15-29 minutes.    The above plan and management options were discussed at length with patient. Patient is in agreement with the above and verbalized understanding. It will be communicated with the referring physician via electronic record, fax, or mail.

## 2024-03-19 NOTE — PROGRESS NOTES
She Disclaimer: This note has been generated using voice-recognition software. There may be typographical errors that have been missed during proof-reading        Patient ID: Steven Campo is a 68 y.o. male.      Chief Complaint: Low-back Pain      68-year-old male presents for new  patient evaluation and treatment of intractable lower back pain.  He was diagnosed with lung cancer several years ago and was treated with chemotherapy and radiation.  His lower back pain worsened over the past several months  and he developed weakness of the lower extremities, difficulty standing and ambulating. He denies bowel or bladder involvement.  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  He declined surgical intervention  with Dr. Ceballos. Fortescue is providing inadequate pain relief.  We discussed a change of medication for better pain relief.  A vertebroplasty was also recommended but due to the mild retropulsion there are relative  contraindications.  I will perform an L1-L2 epidural steroid injection and if his pain remains intractable then we will consider a possible vertebroplasty. The lumbar brace failed to provide any significant improvement.  Diagnostic imagings were reviewed and discussed with patient.            Pain Assessment  Pain Assessment: 0-10  Pain Score:   8  Pain Location: Back  Pain Descriptors: Aching  Pain Frequency: Constant/continuous  Pain Onset: Awakened from sleep  Aggravating Factors: Other (Comment) (getting up from sitting position)  Pain Intervention(s): Medication (See eMAR), Home medication      A's of Opioid Risk Assessment  Activity:Patient is unable to perform ADL.   Analgesia:Patients pain is not controlled by current medication.   Adverse Effects: Patient denies constipation or sedation.  Aberrant Behavior:  reviewed with no aberrant drug seeking/taking behavior.      Patient denies any suicidal or homicidal  ideations    Physical Therapy/Home Exercise:  Unable to participate in physical therapy.  Completed physical therapy at nursing home without improvement     CT Chest With Contrast  Narrative: EXAMINATION:  CT CHEST WITH CONTRAST    CLINICAL HISTORY:  Pleural effusion, not elsewhere classifiedPleural effusion, malignancy suspected;    TECHNIQUE:  Multiplanar CT of the chest with intravenous contrast.  100 cc of Isovue 370.    Dose reduction: The CT exam was performed using one or more dose reduction techniques: Automatic exposure control, automated adjustment of the MA and/or kVP according to patient size, or use of iterative reconstruction technique.    COMPARISON:  2/20/24    FINDINGS:  Lower neck: Normal    Lungs/airway: Right middle lobe/right lower lobe atelectasis. Moderate right-sided pleural effusion with Hounsfield units of 5.  Multiple nodular densities located about the right lower pleura with the largest nodular density measuring 5.4 x 3.1 cm.  Interval enlargement of multiple metastases scattered throughout the left lung with the largest located along the left major fissure now measuring up to 1.5 cm, previously 1.2 cm.    Mediastinum: Multiple nodular densities adjacent to the right-sided ventricle with the largest measuring up to 2.2 x 1.7 cm, previously measuring 1.4 cm.    Upper abdomen: Left-sided adrenal nodule measures 5.5 x 7.2 cm, grossly similar to comparison.  Right-sided adrenal nodule measures 4.5 x 3.9 cm, grossly similar to comparison.  The right adrenal gland lesion extends 1.6 cm into the adjacent medial posterior right hepatic lobe, which is new from comparison. Ill-defined hypoattenuating lesion located at the interface of the left/right hepatic lobe measuring 1.5 x 1.5 cm, series 2, image 111.    Chest wall: Normal    Bones: Lytic lesion located within the spinous process of the T8 vertebral body is unchanged from 02/03/2024.  Severely comminuted compression fracture with severe  height loss of the L1 vertebral body.  The height loss has worsened from 02/3/2024.  Impression: Interval enlargement of multiple metastases scattered throughout the left lung with the largest located along the left major fissure now measuring up to 1.5 cm, previously 1.2 cm.    Multiple nodular densities located about the right lower pleura with the largest nodular density measuring 5.4 x 3.1 cm.    Multiple nodular densities adjacent to the right-sided ventricle with the largest measuring up to 2.2 x 1.7 cm, previously measuring 1.4 cm.    Moderate right-sided pleural effusion with Hounsfield units of 5.  Probably related to malignancy.    Ill-defined hypoattenuating lesion located at the interface of the left/right hepatic lobe measuring 1.5 x 1.5 cm, series 2, image 111.  This could represent hepatic metastasis.    Left-sided adrenal nodule measures 5.5 x 7.2 cm, grossly similar to comparison. The right adrenal gland lesion extends 1.6 cm into the adjacent medial posterior right hepatic lobe, which is new from comparison.    Lytic lesion located within the spinous process of the T8 vertebral body is unchanged from 02/03/2024.    Severely comminuted compression fracture with severe height loss of the L1 vertebral body. The height loss has worsened from 02/3/2024.    Electronically signed by: Lars Maradiaga  Date:    03/11/2024  Time:    11:25      Review of Systems   Constitutional: Negative.    HENT: Negative.     Eyes: Negative.    Respiratory: Negative.     Cardiovascular: Negative.    Gastrointestinal: Negative.    Endocrine: Negative.    Genitourinary: Negative.    Musculoskeletal:  Positive for arthralgias, back pain and gait problem.   Integumentary:  Negative.   Allergic/Immunologic: Negative.    Neurological:  Positive for weakness (Bilateral lower extremities) and numbness (Bilateral lower extremities).   Hematological: Negative.    Psychiatric/Behavioral:  Positive for sleep disturbance.               Past Medical History:   Diagnosis Date    Adenocarcinoma of lung     Dx 2/22/23    Chronic bilateral low back pain with right-sided sciatica     Chronic pain of right knee     Essential (primary) hypertension     Hypercholesteremia     Nicotine dependence, cigarettes, uncomplicated     Seizures 2020    last was approx 7 mths ago    Stroke 2015    with residual right sided weakness     Past Surgical History:   Procedure Laterality Date    KNEE ARTHROSCOPY Right 1995     Social History     Socioeconomic History    Marital status: Unknown   Tobacco Use    Smoking status: Former     Current packs/day: 1.00     Average packs/day: 1 pack/day for 44.0 years (44.0 ttl pk-yrs)     Types: Cigarettes     Passive exposure: Current    Smokeless tobacco: Never   Substance and Sexual Activity    Alcohol use: Yes     Comment: approx 12 pk per week & fifth of vodka per week    Drug use: Yes     Types: Marijuana    Sexual activity: Yes     Birth control/protection: Condom     Social Determinants of Health     Financial Resource Strain: Low Risk  (2/5/2024)    Overall Financial Resource Strain (CARDIA)     Difficulty of Paying Living Expenses: Not hard at all   Food Insecurity: No Food Insecurity (2/5/2024)    Hunger Vital Sign     Worried About Running Out of Food in the Last Year: Never true     Ran Out of Food in the Last Year: Never true   Transportation Needs: No Transportation Needs (2/5/2024)    PRAPARE - Transportation     Lack of Transportation (Medical): No     Lack of Transportation (Non-Medical): No   Physical Activity: Inactive (2/5/2024)    Exercise Vital Sign     Days of Exercise per Week: 0 days     Minutes of Exercise per Session: 0 min   Stress: No Stress Concern Present (2/5/2024)    [place] [employer] - Occupational Stress Questionnaire     Feeling of Stress : Not at all   Social Connections: Moderately Isolated (2/5/2024)    Social Connection and Isolation Panel [NHANES]     Frequency of  Communication with Friends and Family: Three times a week     Frequency of Social Gatherings with Friends and Family: Three times a week     Attends Jainism Services: 1 to 4 times per year     Active Member of Clubs or Organizations: No     Attends Club or Organization Meetings: Never     Marital Status:    Housing Stability: Low Risk  (2/5/2024)    Housing Stability Vital Sign     Unable to Pay for Housing in the Last Year: No     Number of Places Lived in the Last Year: 1     Unstable Housing in the Last Year: No     Family History   Problem Relation Age of Onset    Hypertension Mother     Heart disease Mother         mild MI, later in life    Lung cancer Father     Pancreatic cancer Sister     Hyperlipidemia Sister     Hypertension Sister     Kidney failure Sister     Hypertension Sister     Hyperlipidemia Sister     Hypertension Sister     Hyperlipidemia Sister     Hypertension Sister     Hyperlipidemia Sister     Hypertension Sister     No Known Problems Sister     Heart disease Brother     Hypertension Brother     Hypertension Brother     No Known Problems Daughter     No Known Problems Daughter     No Known Problems Maternal Grandmother     No Known Problems Maternal Grandfather     No Known Problems Paternal Grandmother     No Known Problems Paternal Grandfather      Review of patient's allergies indicates:   Allergen Reactions    Salsalate      Other reaction(s): Abdominal pain    Tramadol      Other reaction(s): Seizure     has a current medication list which includes the following prescription(s): acetaminophen, amlodipine, atorvastatin, finasteride, hydrocodone-acetaminophen, albuterol, albuterol-ipratropium, docusate sodium, hydrocodone-acetaminophen, mometasone, nicotine, [START ON 5/19/2024] oxycodone-acetaminophen, and polyethylene glycol.      Objective:  Vitals:    03/20/24 1018   BP: (!) 109/57   Pulse: 103   Resp: 18        Physical Exam  Vitals and nursing note reviewed. Chaperone  present: Accompanied by his sister.   Constitutional:       General: He is not in acute distress.     Appearance: Normal appearance. He is not ill-appearing, toxic-appearing or diaphoretic.   HENT:      Head: Normocephalic and atraumatic.      Nose: Nose normal.      Mouth/Throat:      Mouth: Mucous membranes are moist.   Eyes:      Extraocular Movements: Extraocular movements intact.      Pupils: Pupils are equal, round, and reactive to light.   Cardiovascular:      Rate and Rhythm: Normal rate and regular rhythm.      Heart sounds: Normal heart sounds.   Pulmonary:      Effort: Pulmonary effort is normal. No respiratory distress.      Breath sounds: Normal breath sounds. No stridor. No wheezing or rhonchi.   Abdominal:      General: Bowel sounds are normal.      Palpations: Abdomen is soft.   Musculoskeletal:         General: No swelling or deformity.      Cervical back: Normal and normal range of motion. No spasms or tenderness. No pain with movement. Normal range of motion.      Thoracic back: Normal.      Lumbar back: Spasms, tenderness and bony tenderness present. Decreased range of motion. Negative right straight leg raise test and negative left straight leg raise test. No scoliosis.      Right lower leg: No edema.      Left lower leg: No edema.      Comments: Lumbar pain with flexion, extension and lateral rotation.  Diffuse pain along the thoracic and lumbar spinous and paraspinous muscles to palpation   Skin:     General: Skin is warm.   Neurological:      General: No focal deficit present.      Mental Status: He is alert and oriented to person, place, and time. Mental status is at baseline.      Cranial Nerves: No cranial nerve deficit.      Sensory: Sensory deficit (BLE) present.      Motor: Weakness (BLE) present.      Coordination: Coordination normal.      Gait: Gait abnormal (Requires a wheelchair for mobility).      Deep Tendon Reflexes: Reflexes are normal and symmetric.   Psychiatric:         Mood  and Affect: Mood normal.         Behavior: Behavior normal.           Assessment:      1. Closed compression fracture of L1 lumbar vertebra with routine healing, subsequent encounter    2. Encounter for long-term (current) use of other medications    3. History of stroke    4. Mass of right lung    5. Stage 3b chronic kidney disease    6. Chronic bilateral low back pain with right-sided sciatica    7. Generalized weakness          Plan:  1. reviewed  2.Addiction, Dependency, Tolerance, Opioid abuse-misuse, Death, Diversion Discussed. Overdose reversal drug Naloxone discussed.Patient is prescribed opiates for chronic nonmalignant pain pathology.  Patient is receiving opiates which require greater than a 72 hour supply of therapy.  Patient was educated on potential dependency associated with long-term opioid use as well as decreasing efficacy with prolonged use.  Patient was advised of risks, benefits and side effects and how to utilize each medication.  Patient was also informed that any deviation from therapy protocol will  lead to discontinuation of opiates.  It is reasonable to prescribe opioid analgesics for patient based on positive response to opioid medications, lack of side effects and  limited aberrant behavior.    3.Refill/Continue medications for pain control and function       Requested Prescriptions     Signed Prescriptions Disp Refills    oxyCODONE-acetaminophen (PERCOCET)  mg per tablet 45 tablet 0     Sig: Take 1 tablet by mouth every 8 (eight) hours as needed for Pain.     4. Serum drug test for compliance  5. Schedule L1-2 epidural steroid injection for intractable lower back pain and compression fracture  Orders Placed This Encounter   Procedures    Miscellaneous Test, Sendout [facility] Drug test     Standing Status:   Future     Number of Occurrences:   1     Standing Expiration Date:   5/19/2025     Order Specific Question:   What is the name of the test you wish to perform? (Note: Please  provide the reference lab test code if possible. If you have any questions, call the Lab.)     Answer:   [facility] Drug test    Case Request Operating Room: L1-2 MIAN     Order Specific Question:   Medical Necessity:     Answer:   Medically Non-Urgent [100]     Order Specific Question:   CPT Code:     Answer:   RI INJ LUMBAR/SACRAL, W/IMAGING GUIDANCE [52968]     Order Specific Question:   Positioning:     Answer:   Prone [1003]     Order Specific Question:   Post-Procedure Disposition:     Answer:   PACU [1]     Order Specific Question:   Estimated Length of Stay:     Answer:   0 midnight     Order Specific Question:   Implant Required:     Answer:   No [1001]     Order Specific Question:   Is an on-site pathologist required for this procedure?     Answer:   N/A      6.Indications for this procedure for this specific patient include the following   -History, physical examination and concordant radiological image based diagnostic testing supports medical necessity for an epidural steroid injection   neurogenic claudication due to central disc pathology, osteophyte complexes, severe degenerative disc disease producing foraminal or central stenosis  - Pt has been in pain for at least 6 weeks and has failed conservative care (e.g. Exercise, physical methods, NSAID/ and or muscle relaxants)   - Pt has no major risk factors for spinal cancer or contraindicated condition   - Neurogenic claudication and Radicular pain are interfering with functional activity  - Pain is associated with symptoms of nerve root irritation   - Any numbness documented is accompanied with paresthesia   - No evidence of systemic or local infection, bleeding tendency or unstable medical condition   - Epidural provided as part of a comprehensive pain management program  - All repeat injections have at least 80% pain relief and increase functional gain and physical activity, and reduction in reliance on the use of medication and or physical therapy  -  Pt has significant functional limitation resulting in diminished quality of life and impaired age appropriate ADL's.   - Diagnostic evaluation has ruled out other causes of pain  - Pt participating in an active rehabilitation program or home exercise program which has been discussed with the patient including heat ice and rest  - No more than 3 epidurals will be done in a 6 month period at the same level with at least 7 days between injections  - MAC is only offered in cases of needle phobia   - Injection done at L1-2 level which is consistent with patient's dermatomal pain complaint    7.Monitored Anesthesia Care medical necessity authorization request:    Monitor anesthesia request is medically indicated for the scheduled nerve block procedure due to:  - needle phobia and anxiety, placing  the patient at risk during the provided service.  -patient is unable to follow simple commands due to mental state  -patient has an ASA class greater than 3 and requires constant presence of an anesthesiologist during the procedure:  -patient has severe problems with muscles and muscle spasticity that makes it hard to lie still  -patient suffers from chronic pain and is unable to function due to  diminished ADLs    8.The planned medically necessary  surgical procedure is performed in a hospital outpatient department and not in an ambulatory surgical center due to:     -there is no geographically assessable ambulatory surgery center that has the  necessary equipment and fluoroscopy needed for the procedure     -there is no geographically assessable ambulatory surgical center available at which the physician has privileges     -an ASC's  specific  guideline regarding the individuals weight or health conditions that prevent the use of an ASC       -injections must be performed under fluoroscopy image guidance with contrast unless the patient has a documented contrast allergy or pregnancy            report:  Reviewed and  consistent with medication use as prescribed.      The total time spent for evaluation and management on 03/20/2024 including reviewing separately obtained history, performing a medically appropriate exam and evaluation, documenting clinical information in the health record, independently interpreting results and communicating them to the patient/family/caregiver, and ordering medications/tests/procedures was between 15-29 minutes.    The above plan and management options were discussed at length with patient. Patient is in agreement with the above and verbalized understanding. It will be communicated with the referring physician via electronic record, fax, or mail.

## 2024-03-19 NOTE — PROGRESS NOTES
Patient Name: Steven Campo   Primary Care Provider: Dawn Dumont FNP   Date of Service: 03/19/2024   Reason for Referral: F/U    Chief Complaint: Pleural Effusion (Back hurts both sides. D/C 2/8/2024 from here. )        Steven Campo is 68 y.o. male with past medical history significant for known stage IV adenocarcinoma of the lung.  I first saw the patient during his inpatient visit (at the time he was DNR, documented as being on palliative care)     Follow up clinic visit 3/19/24   Worsening pathological fracture   More shortness of breath   Oxygen saturations preserved  Sent to pain management Clinic   Sent to spine surgery   I reviewed his recent CT scan from 3/11/24 as well which showed interval enlargement of multiple metastases scattered throughout the left lung and multiple nodular densities in the right sided pleura with a moderate right-sided pleural effusion seen once again.  Additional adrenal and lytic nodules noted in the radiologist former report.    From my inpatient note     Pleural effusion   He has a large right-sided pleural effusion which is likely in the setting of compressive atelectasis from his endobronchial disease versus malignant pleural effusion.  He also has a mediastinal shift.  When compared to his initial CT from November 2022, he had a very similar obstruction of the bronchus intermedius.  At this time his endobronchial obstruction appears to be very similar however he does have increasing pleural effusion which may be in the setting of compressive atelectasis.     He did have evidence of endobronchial disease during his bronchoscopy in February 2023.     Given that he is minimally symptomatic at this time, stenting the bronchus intermedius is unlikely to be of any additional benefit.  He is yet to complete his radiation treatment at this time and was most recently on systemic treatment for his lung cancer.  His mild dyspnea may be improved by helping with his pleural  effusion (which may benefit by placement of PleurX catheter).     Recommendations   Continue to monitor hemoglobin/hematocrit and coagulation studies to ensure stabilization  We will plan to perform airway exam with bronchoalveolar lavage (for concern regarding postobstructive pneumonia) under general anesthesia on 2/7/24   Under the same general anesthesia, we will  consider placement of a PleurX catheter in the right pleural space      Images below compares the bronchus intermedius obstruction in November 2022 to February 2024.  Increasing pleural effusion (which may be contributing to the patient's mild dyspnea and may possibly be parapneumonic or secondary to the compressive atelectasis, or a malignant pleural effusion given the ipsilateral lung cancer)    Mass of right lung, stage IV adenocarcinoma of the lung currently on treatment    As above        He then underwent bronchoscopy on 2/7/24.     Overall Impression:  Successful airway inspection which showed presence of right upper lobe obstruction (complete) and bronchus intermedius (90%) from tumor.  Friable, no active bleeding.  Unable to traverse bronchus intermedius endobronchial lesion.  Left bronchial tree without evidence of endobronchial lesion.  Bronchoalveolar lavage performed in the right mainstem without evidence of gross purulence.     I also performed thoracic ultrasound at that time which showed no safe window to place a PleurX catheter.  As noted in my thoracic ultrasound procedure note     Status post bronchoscopy with near complete obstruction of right bronchial tree with endobronchial mass and observation of pleural space with ultrasound, the patient may not benefit from placement of PleurX catheter as there is significant doubt that the patient would have lung expansion with the extent of both endobronchial tumor and extent of tumor seen on the ultrasound and CT.           Assessment and Plan        Massive right lung, stage IV  adenocarcinoma   Right upper lobe complete obstruction and 90% obstruction of bronchus intermedius   Right-sided pleural effusion  Back pain        Assessment:  Patient is stable today, not symptomatic from his symptoms, it appears that he has compensated well, breathing from his left lung.  Patient and family not in favor of additional procedures at this time has a patient has minimal symptoms.     Plan  No acute pulmonary intervention at this time warranted  Orthopedic surgery and pain management referrals placed        Gonzalo Robert MD  Interventional Pulmonary and Critical Care  [facility]    Problem List Items Addressed This Visit          Neuro    Closed compression fracture of L1 vertebra - Primary (Chronic)    Relevant Orders    Ambulatory referral/consult to Pain Clinic    Ambulatory referral/consult to Orthopedics           Past Medical History:   Diagnosis Date    Adenocarcinoma of lung     Dx 2/22/23    BPH (benign prostatic hyperplasia)     Chronic bilateral low back pain with right-sided sciatica     Chronic pain of right knee     Essential (primary) hypertension     Hemiplegia     Hypercholesteremia     Muscle weakness     Nicotine dependence, cigarettes, uncomplicated     Renal disorder     Seizures 2020    last was approx 7 mths ago    Stroke 2015    with residual right sided weakness      Past Surgical History:   Procedure Laterality Date    EPIDURAL STEROID INJECTION INTO LUMBAR SPINE Bilateral 4/9/2024    Procedure: L1-2 MIAN;  Surgeon: Jenna Jenkins MD;  Location: [facility];  Service: Pain Management;  Laterality: Bilateral;    KNEE ARTHROSCOPY Right 1995    KYPHOPLASTY       Family History   Problem Relation Name Age of Onset    Hypertension Mother      Heart disease Mother          mild MI, later in life    Lung cancer Father      Pancreatic cancer Sister      Hyperlipidemia Sister      Hypertension Sister      Kidney failure Sister      Hypertension Sister       Hyperlipidemia Sister      Hypertension Sister      Hyperlipidemia Sister      Hypertension Sister      Hyperlipidemia Sister      Hypertension Sister      No Known Problems Sister      Heart disease Brother      Hypertension Brother      Hypertension Brother      No Known Problems Daughter      No Known Problems Daughter      No Known Problems Maternal Grandmother      No Known Problems Maternal Grandfather      No Known Problems Paternal Grandmother      No Known Problems Paternal Grandfather       Review of patient's allergies indicates:   Allergen Reactions    Salsalate      Other reaction(s): Abdominal pain    Tramadol      Other reaction(s): Seizure      Social History     Tobacco Use    Smoking status: Former     Current packs/day: 1.00     Average packs/day: 1 pack/day for 44.0 years (44.0 ttl pk-yrs)     Types: Cigarettes     Passive exposure: Current    Smokeless tobacco: Never   Substance Use Topics    Alcohol use: Yes     Comment: approx 12 pk per week & fifth of vodka per week    Drug use: Yes     Types: Marijuana      Review of Systems       Objective:      Physical Exam     Physical Exam  Constitutional:       General: He is not in acute distress.     Appearance: Normal appearance. He is normal weight. He is not ill-appearing or diaphoretic.   HENT:      Head: Normocephalic and atraumatic.      Nose: No congestion or rhinorrhea.      Mouth/Throat:      Mouth: Mucous membranes are moist.   Cardiovascular:      Rate and Rhythm: Normal rate and regular rhythm.      Pulses: Normal pulses.      Heart sounds: Normal heart sounds.   Pulmonary:      Effort: Pulmonary effort is normal. No respiratory distress.      Breath sounds: No stridor. No wheezing, rhonchi or rales.      Comments:   Decreased breath sounds right hemithorax  Chest:      Chest wall: No tenderness.   Abdominal:      General: Abdomen is flat.   Musculoskeletal:      Cervical back: Normal range of motion. No rigidity.      Right lower leg: No  "edema.      Left lower leg: No edema.   Skin:     General: Skin is warm.      Findings: No erythema.   Neurological:      General: No focal deficit present.      Mental Status: He is alert and oriented to person, place, and time. Mental status is at baseline.   Psychiatric:         Mood and Affect: Mood normal.         Behavior: Behavior normal.         Thought Content: Thought content normal.      5/5/2024     3:36 PM 4/10/2024     3:35 PM 4/10/2024     2:19 PM 4/9/2024    11:50 PM 4/9/2024     1:15 PM 4/9/2024     1:10 PM 4/9/2024     1:05 PM   Pulmonary Function Tests   SpO2  98 % 99 % 98 % 94 % 96 % 97 %   Height 5' 11" (1.803 m)   5' 11" (1.803 m)      Weight 47.6 kg (105 lb)   52.2 kg (115 lb)      BMI (Calculated) 14.7   16            Outpatient Encounter Medications as of 3/19/2024   Medication Sig Dispense Refill    acetaminophen (TYLENOL) 325 MG tablet Take 325 mg by mouth every 6 (six) hours as needed for Pain.      albuterol (VENTOLIN HFA) 90 mcg/actuation inhaler Inhale 2 puffs into the lungs every 4 (four) hours as needed for Wheezing or Shortness of Breath. Rescue 54 g 1    albuterol-ipratropium (DUO-NEB) 2.5 mg-0.5 mg/3 mL nebulizer solution Take 3 mLs by nebulization every 6 (six) hours as needed for Wheezing or Shortness of Breath. Rescue 360 mL 1    amLODIPine (NORVASC) 5 MG tablet Take 1 tablet (5 mg total) by mouth once daily. 90 tablet 1    atorvastatin (LIPITOR) 40 MG tablet Take 1 tablet (40 mg total) by mouth once daily. 90 tablet 3    docusate sodium (COLACE) 100 MG capsule Take 100 mg by mouth 2 (two) times daily.      finasteride (PROSCAR) 5 mg tablet Take 5 mg by mouth once daily.      mometasone (ASMANEX HFA) 200 mcg/actuation HFAA Inhale 2 puffs into the lungs 2 (two) times a day. Controller 39 g 3    polyethylene glycol (GLYCOLAX) 17 gram PwPk Take 17 g by mouth 2 (two) times daily as needed.      [DISCONTINUED] HYDROcodone-acetaminophen (NORCO)  mg per tablet Take 1 tablet by " mouth every 6 (six) hours as needed.      [DISCONTINUED] HYDROcodone-acetaminophen (NORCO) 5-325 mg per tablet Take 1 tablet by mouth every 6 (six) hours as needed for Pain.      [DISCONTINUED] nicotine (NICODERM CQ) 14 mg/24 hr Place 1 patch onto the skin once daily. (Patient not taking: Reported on 2/20/2024)       No facility-administered encounter medications on file as of 3/19/2024.       Assessment & Plan    As above                                          Orders Placed This Encounter   Procedures    Ambulatory referral/consult to Pain Clinic     Standing Status:   Future     Standing Expiration Date:   4/19/2025     Referral Priority:   Routine     Referral Type:   Consultation     Referral Reason:   Specialty Services Required     Requested Specialty:   Pain Medicine     Number of Visits Requested:   1    Ambulatory referral/consult to Orthopedics     Standing Status:   Future     Number of Occurrences:   1     Standing Expiration Date:   4/19/2025     Referral Priority:   Routine     Referral Type:   Consultation     Requested Specialty:   Orthopedic Surgery     Number of Visits Requested:   1

## 2024-03-20 NOTE — PATIENT INSTRUCTIONS
L1-2 MIAN April 9 @ 11:10am      Procedure Instructions:    Nothing to eat or drink for 8 hours or after midnight including gum, candy, mints, or tobacco products.  If you are scheduled for 1:30 or later nothing to eat or drink after 5 a.m. the morning of the procedure, including gum, candy, mints, or tobacco products.  Must have a  at least 18 yrs of age to stay present at all times  No Diabetic medications the morning of procedure, check blood sugar the morning of procedure, if it is greater than 200 call the office at [phone]  If you are started on antibiotics or have been prescribed antibiotics, have a fever, or have any other type of infection call to reschedule procedure.  If you take blood pressure medications you can take it at your regular scheduled time with a small sip of WATER!  Dentures are to be removed prior to procedure or we can provide you with a denture cup to remove them prior to being taken back for procedure.   False eyelashes are to be removed before the morning of procedure.    Contacts will have to be removed prior to procedure.  No jewelry is to be worn to the procedure.     HOLD ASPIRIN AND ASPIRIN PRODUCTS  (ASPIRIN, BC POWDER ETC. ) FOR 7 DAYS  PRIOR TO PROCEDURE  HOLD NSAIDS( ibuprofen, mobic, meloxicam, advil, diclofenac, naproxen, relafen, celebrex,  methotrexate, aleve etc....)  FOR 3 DAYS   PRIOR TO PROCEDURE

## 2024-04-09 NOTE — PLAN OF CARE
Plan:  D/c pt via wheelchair at 1320  Informed pt if does not void in 8 hours to go to ER. Notify if redness, drainage, from injection site or fever over next 3-4 days. Rest and drink plenty of fluids for the remainder of the day. No lifting over 5 lbs. For the remainder of the day. Continue regular medications as prescribed. May take pain medications as prescribed.     Pain improved 100%  Pre-procedure pain: 10  Post procedure pain: 0

## 2024-04-09 NOTE — TRANSFER OF CARE
"Anesthesia Transfer of Care Note    Patient: Steven Campo    Procedure(s) Performed: Procedure(s) (LRB):  L1-2 MIAN (Bilateral)    Patient location: Other: Pain Tx    Anesthesia Type: general    Transport from OR: Transported from OR on room air with adequate spontaneous ventilation    Post pain: adequate analgesia    Post assessment: no apparent anesthetic complications and tolerated procedure well    Post vital signs: stable    Level of consciousness: responds to stimulation    Nausea/Vomiting: no nausea/vomiting    Complications: none    Transfer of care protocol was followed      Last vitals: Visit Vitals  BP 95/64 (BP Location: Left arm, Patient Position: Lying)   Pulse (!) 111   Temp 36.4 °C (97.5 °F) (Oral)   Resp 16   Ht 5' 11" (1.803 m)   Wt 52.2 kg (115 lb)   SpO2 (!) 93%   BMI 16.04 kg/m²     "

## 2024-04-09 NOTE — ANESTHESIA PREPROCEDURE EVALUATION
04/09/2024  Steven Campo is a 68 y.o., male.    Past Medical History:   Diagnosis Date    Adenocarcinoma of lung     Dx 2/22/23    Chronic bilateral low back pain with right-sided sciatica     Chronic pain of right knee     Essential (primary) hypertension     Hypercholesteremia     Nicotine dependence, cigarettes, uncomplicated     Seizures 2020    last was approx 7 mths ago    Stroke 2015    with residual right sided weakness       Past Surgical History:   Procedure Laterality Date    KNEE ARTHROSCOPY Right 1995       Family History   Problem Relation Age of Onset    Hypertension Mother     Heart disease Mother         mild MI, later in life    Lung cancer Father     Pancreatic cancer Sister     Hyperlipidemia Sister     Hypertension Sister     Kidney failure Sister     Hypertension Sister     Hyperlipidemia Sister     Hypertension Sister     Hyperlipidemia Sister     Hypertension Sister     Hyperlipidemia Sister     Hypertension Sister     No Known Problems Sister     Heart disease Brother     Hypertension Brother     Hypertension Brother     No Known Problems Daughter     No Known Problems Daughter     No Known Problems Maternal Grandmother     No Known Problems Maternal Grandfather     No Known Problems Paternal Grandmother     No Known Problems Paternal Grandfather        Social History     Socioeconomic History    Marital status: Unknown   Tobacco Use    Smoking status: Former     Current packs/day: 1.00     Average packs/day: 1 pack/day for 44.0 years (44.0 ttl pk-yrs)     Types: Cigarettes     Passive exposure: Current    Smokeless tobacco: Never   Substance and Sexual Activity    Alcohol use: Yes     Comment: approx 12 pk per week & fifth of vodka per week    Drug use: Yes     Types: Marijuana    Sexual activity: Yes     Birth control/protection: Condom     Social Determinants of Health      Financial Resource Strain: Low Risk  (2/5/2024)    Overall Financial Resource Strain (CARDIA)     Difficulty of Paying Living Expenses: Not hard at all   Food Insecurity: No Food Insecurity (2/5/2024)    Hunger Vital Sign     Worried About Running Out of Food in the Last Year: Never true     Ran Out of Food in the Last Year: Never true   Transportation Needs: No Transportation Needs (2/5/2024)    PRAPARE - Transportation     Lack of Transportation (Medical): No     Lack of Transportation (Non-Medical): No   Physical Activity: Inactive (2/5/2024)    Exercise Vital Sign     Days of Exercise per Week: 0 days     Minutes of Exercise per Session: 0 min   Stress: No Stress Concern Present (2/5/2024)    [employer] - Occupational Stress Questionnaire     Feeling of Stress : Not at all   Social Connections: Moderately Isolated (2/5/2024)    Social Connection and Isolation Panel [NHANES]     Frequency of Communication with Friends and Family: Three times a week     Frequency of Social Gatherings with Friends and Family: Three times a week     Attends Spiritism Services: 1 to 4 times per year     Active Member of Clubs or Organizations: No     Attends Club or Organization Meetings: Never     Marital Status:    Housing Stability: Low Risk  (2/5/2024)    Housing Stability Vital Sign     Unable to Pay for Housing in the Last Year: No     Number of Places Lived in the Last Year: 1     Unstable Housing in the Last Year: No       Current Facility-Administered Medications   Medication Dose Route Frequency Provider Last Rate Last Admin    0.9%  NaCl infusion   Intravenous Continuous Jenna Jenkins MD           Review of patient's allergies indicates:   Allergen Reactions    Salsalate      Other reaction(s): Abdominal pain    Tramadol      Other reaction(s): Seizure        Pre-op Assessment    I have reviewed the Patient Summary Reports.     I have reviewed the Nursing Notes. I have reviewed  the NPO Status.   I have reviewed the Medications.     Review of Systems  Anesthesia Hx:  No problems with previous Anesthesia                Cardiovascular:     Hypertension                                        Pulmonary:  Pneumonia COPD                     Renal/:  Chronic Renal Disease                Neurological:   CVA Neuromuscular Disease,   Seizures                                       Anesthesia Plan  Type of Anesthesia, risks & benefits discussed:    Anesthesia Type: Gen Natural Airway, MAC  Intra-op Monitoring Plan: Standard ASA Monitors  Post Op Pain Control Plan: multimodal analgesia  Induction:  IV  Informed Consent: Informed consent signed with the Patient and all parties understand the risks and agree with anesthesia plan.  All questions answered. Patient consented to blood products? Yes  ASA Score: 3  Day of Surgery Review of History & Physical: H&P Update referred to the surgeon/provider.I have interviewed and examined the patient. I have reviewed the patient's H&P dated: There are no significant changes.     Ready For Surgery From Anesthesia Perspective.     .

## 2024-04-09 NOTE — BRIEF OP NOTE
The  Discharge Note  Short Stay    Admit Date: 4/9/2024    Discharge Date: 4/9/2024    Attending Physician: Jenna Jenkins     Discharge Provider: Jenna Jenkins    Diagnosis: Chronic lower back pain    Procedure performed: L1-2 MIAN and epiduralgram under fluoroscopy    Findings: Procedure tolerated well and without complications. Consistent with diagnosis.    EBL: 0cc    Specimens: None    Discharged Condition: Good    Final Diagnoses: Closed compression fracture of L1 lumbar vertebra with routine healing, subsequent encounter [H11.038S]    Disposition: Home or Self Care    Hospital Course: No complications, uneventful    Outcome of Hospitalization, Treatment, Procedure, or Surgery:  Patient was admitted for outpatient interventional pain management procedure. The patient tolerated the procedure well with no complications.    Follow up/Patient Instructions:  Follow up as scheduled in Pain Management office in 3-4 weeks.  Patient has received instructions and follow up date and time.    Medications:  Continue previous medications

## 2024-04-09 NOTE — OP NOTE
"Procedure Note    Procedure Date: 4/9/2024    Procedure Performed:  Lumbar interlaminar epidural steroid injection under fluoroscopy at L1-2    Indications: Patient failed conservative therapy.      Pre-op diagnosis: Lumbar Radiculopathy    Post-op diagnosis: same    Physician: Jenna Jenkins MD    Anesthesia: MAC    Medications injected: Kenalog 40mg,  2 mL sterile preservative-free normal saline.    Local anesthetic used: 1% Lidocaine, 3 ml    Estimated Blood Loss: None    Complications:  None    Technique:  The patient was interviewed in the holding area and Risks/Benefits were discussed, including, but not limited to, the possibility of new or different pain, bleeding or infection.   All questions were answered.  The patient understood and accepted risks.  Consent was verified and signed.   A time-out was taken to identify patient and procedure prior to starting the procedure. The patient was placed in the prone position on the fluoroscopy table. The area of the lumbar spine was prepped with Chloraprep and draped in a sterile manner. The L1-2  interspace was identified and marked under AP fluoroscopy. The skin and subcutaneous tissues overlying the targeted interspace were anesthetized with 3-5 mL of 1% lidocaine using a 25G 1.5" needle.  A 20G  3.5" Tuohy epidural needle was directed toward the interspace under fluoroscopic guidance until the ligamentum flavum was engaged. From this point, a loss of resistance technique with a pulsator syringe a was used to identify entrance of the needle into the epidural space. Once loss of resistance was observed 3mL of Isovue contrast solution was injected. An appropriate epidurogram was noted.  A 3mL mixture consisting of saline and 40 mg of kenalog was injected slowly and without resistance.  The needle was  removed and a sterile Band-Aid dressing was applied to the puncture site.  The patient tolerated the procedure well and was transferred to the .AC. in stable " condition.  The patient was monitored after the procedure and was given post-procedure and discharge instructions to follow at home. The patient was discharged in a stable condition and accompanied by an adult .    Epidurogram:5 mL allotment of Isovue M 300 contrast revealed excellent delineation from T12-L2. There were no filling defects or obstruction to dye flow noted.  There was no  intravascular or intrathecal spread noted with dye flow.

## 2024-04-09 NOTE — ANESTHESIA POSTPROCEDURE EVALUATION
Anesthesia Post Evaluation    Patient: Steven Campo    Procedure(s) Performed: Procedure(s) (LRB):  L1-2 MIAN (Bilateral)    Final Anesthesia Type: general      Patient location: Pain Treatment.  Patient participation: Yes- Able to Participate  Level of consciousness: awake and alert  Post-procedure vital signs: reviewed and stable  Pain management: adequate  Airway patency: patent    PONV status at discharge: No PONV  Anesthetic complications: no      Cardiovascular status: stable  Respiratory status: unassisted  Hydration status: euvolemic  Follow-up not needed.              Vitals Value Taken Time   /72 04/09/24 1317   Temp  04/09/24 1409   Pulse 112 04/09/24 1319   Resp 15 04/09/24 1319   SpO2 95 % 04/09/24 1319   Vitals shown include unvalidated device data.      Event Time   Out of Recovery 13:15:00         Pain/Charity Score: Charity Score: 10 (4/9/2024  1:15 PM)

## 2024-04-09 NOTE — DISCHARGE SUMMARY
[facility] - Pain Management  Discharge Note  Short Stay    Procedure(s) (LRB):  L1-2 MIAN (Bilateral)      OUTCOME: Patient tolerated treatment/procedure well without complication and is now ready for discharge.    DISPOSITION: Home or Self Care    FINAL DIAGNOSIS:  Chronic lower back pain and L1 compression fracture    FOLLOWUP: In clinic    DISCHARGE INSTRUCTIONS:  See nurse's notes     TIME SPENT ON DISCHARGE: 5 minutes

## 2024-04-10 NOTE — ED PROVIDER NOTES
"Encounter Date: 4/10/2024       History     Chief Complaint   Patient presents with    Urinary Retention     68 year old male presents to ED with complaint of inability to urinate. Patient states he has "prostate problems" and has had difficulty with urinating for several months. Patient states he has not urinated since being seen in ED on last night/this morning. Patient had several attempts of in and out cath without staffing being able to cath him due to his prostate. Patient was sent back for further evaluation. Patient states he has been seen in the past by Urology for his prostate and is on medication daily. Denies fever, chills, abdominal pain, chest pain, shortness of breath.     The history is provided by the patient, medical records, the nursing home and the EMS personnel.     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 sciatica     Chronic pain of right knee     Essential (primary) hypertension     Hemiplegia     Hypercholesteremia     Muscle weakness     Nicotine dependence, cigarettes, uncomplicated     Renal disorder     Seizures 2020    last was approx 7 mths ago    Stroke 2015    with residual right sided weakness     Past Surgical History:   Procedure Laterality Date    EPIDURAL STEROID INJECTION INTO LUMBAR SPINE Bilateral 4/9/2024    Procedure: L1-2 MIAN;  Surgeon: Jenna Jenkins MD;  Location: [facility];  Service: Pain Management;  Laterality: Bilateral;    KNEE ARTHROSCOPY Right 1995    KYPHOPLASTY       Family History   Problem Relation Age of Onset    Hypertension Mother     Heart disease Mother         mild MI, later in life    Lung cancer Father     Pancreatic cancer Sister     Hyperlipidemia " Sister     Hypertension Sister     Kidney failure Sister     Hypertension Sister     Hyperlipidemia Sister     Hypertension Sister     Hyperlipidemia Sister     Hypertension Sister     Hyperlipidemia Sister     Hypertension Sister     No Known Problems Sister     Heart disease Brother     Hypertension Brother     Hypertension Brother     No Known Problems Daughter     No Known Problems Daughter     No Known Problems Maternal Grandmother     No Known Problems Maternal Grandfather     No Known Problems Paternal Grandmother     No Known Problems Paternal Grandfather      Social History     Tobacco Use    Smoking status: Former     Current packs/day: 1.00     Average packs/day: 1 pack/day for 44.0 years (44.0 ttl pk-yrs)     Types: Cigarettes     Passive exposure: Current    Smokeless tobacco: Never   Substance Use Topics    Alcohol use: Yes     Comment: approx 12 pk per week & fifth of vodka per week    Drug use: Yes     Types: Marijuana     Review of Systems   Constitutional:  Negative for chills and fever.   Eyes:  Negative for photophobia and visual disturbance.   Respiratory:  Negative for cough and shortness of breath.    Cardiovascular:  Negative for chest pain and palpitations.   Gastrointestinal:  Negative for abdominal pain, nausea and vomiting.   Genitourinary:  Positive for decreased urine volume and difficulty urinating. Negative for penile pain.   Musculoskeletal:  Negative for arthralgias and gait problem.   Neurological:  Negative for dizziness and weakness.   Hematological:  Negative for adenopathy. Does not bruise/bleed easily.   Psychiatric/Behavioral:  Negative for agitation and confusion.    All other systems reviewed and are negative.      Physical Exam     Initial Vitals [04/10/24 1419]   BP Pulse Resp Temp SpO2   100/68 94 18 98.9 °F (37.2 °C) 99 %      MAP       --         Physical Exam    Nursing note and vitals reviewed.  Constitutional: He appears well-developed and well-nourished.   HENT:  "  Head: Normocephalic and atraumatic.   Eyes: EOM are normal. Pupils are equal, round, and reactive to light.   Neck: Neck supple.   Normal range of motion.  Cardiovascular:  Normal rate and regular rhythm.           No murmur heard.  Pulmonary/Chest: He has no wheezes. He has no rhonchi.   Abdominal: Abdomen is soft. He exhibits distension. There is abdominal tenderness.   Musculoskeletal:         General: No tenderness or edema.      Cervical back: Normal range of motion and neck supple.     Lymphadenopathy:     He has no cervical adenopathy.   Neurological: He is alert and oriented to person, place, and time. No cranial nerve deficit or sensory deficit.   Skin: Skin is warm and dry. Capillary refill takes less than 2 seconds.   Psychiatric: He has a normal mood and affect. Thought content normal.         Medical Screening Exam   See Full Note    ED Course   Procedures  Labs Reviewed - No data to display       Imaging Results    None          Medications - No data to display  Medical Decision Making  68 year old male presents to ED with complaint of inability to urinate. Patient states he has "prostate problems" and has had difficulty with urinating for several months. Patient states he has not urinated since being seen in ED on last night/this morning. Patient had several attempts of in and out cath without staffing being able to cath him due to his prostate. Patient was sent back for further evaluation. Patient states he has been seen in the past by Urology for his prostate and is on medication daily. Denies fever, chills, abdominal pain, chest pain, shortness of breath.     MDM    Patient presents for emergent evaluation of acute urinary retention that poses a threat to life and/or bodily function.    In the ED patient found to have acute urinary retention; BPH.    Labs, diagnostics not indicated for this ED visit      Patient was managed in the ED with coude catheter.    The response to treatment was good.  "   Patient was discharged in stable condition.  Detailed return precautions discussed.                                        Clinical Impression:   Final diagnoses:  [R33.9] Urinary retention (Primary)  [N40.1, R33.8] Benign prostatic hyperplasia with urinary retention        ED Disposition Condition    Discharge Stable          ED Prescriptions    None       Follow-up Information    None          Sarika Reid, [facility]  04/10/24 0575

## 2024-04-11 NOTE — PROGRESS NOTES
MDM/time:  Greater than 30 minutes spent on this encounter including 10 minutes reviewing imaging and notes, 15 minutes with the patient, 5 minutes documentation    ASSESSMENT:  68 y.o. male with metastatic lung cancer with pathologic L1 fracture and stenosis.  Prior CVA with right-sided weakness    PLAN:  We will continue to observe.  Follow-up in 6 weeks with x-rays    HPI:  68 y.o. male here for repeat evaluation of pathologic L1 compression fracture.  Pain is about the same.  He had an injection with Dr. Jenkins on Tuesday with minimal relief.  Denies any new injuries.  He has known stage IV lung cancer being treated with radiation by Dr. Sharma after having received chemotherapy.  Presented to the ER 02/03/2024 complaining of leg weakness.  He sustained a fall from standing and was not able to get up.  He was found down on the floor and brought in by EMS.  He was found to have right lung atelectasis and right-sided pleural fusion.  CTs cervical, thoracic, lumbar spine and MRI lumbar spine detailed below showed multiple spinal metastases in a large soft tissue mass at L1 with pathologic fracture and stenosis.  Reports history of prior CVA about 15 years ago with residual right-sided weakness.    IMAGING:  X-rays lumbar spine reviewed show:   On the AP there is normal coronal alignment.  There are 5 non-rib-bearing lumbar vertebrae.  On the lateral there is decreased lumbar lordosis.  There is spondylotic disease with decreased disc height and osteophyte formation noted.  Grade 1 anterolisthesis at L4-5.  There is a pathologic fracture at L1 with kyphotic alignment.    CT cervical spine 02/03/2024 reviewed shows:   There is a patchy lytic lesion in the C2 vertebral body and dens.  There is normal coronal alignment.  There is loss of cervical lordosis.  There is severe subaxial spondylotic disease with severe bilateral foraminal stenosis at C3-4, C4-5, C5-6, C6-7, C7-T1.  No apparent fractures.  No  listhesis    CT thoracic spine 02/03/2024 reviewed shows:  There is a sweeping thoracolumbar curvature to the left.  There is severe thoracic spondylosis with bridging osteophytes.  There is a right pleural fusion.    At T1-2 there is severe bilateral foraminal stenosis  At T2-3 there is severe bilateral foraminal stenosis   At T3-4 there is severe right foraminal stenosis  At T8 there is a lytic lesion in the left T8 lamina extending into the spinous process    CT lumbar spine 02/03/2024 reviewed shows:  There is a right thoracolumbar curvature.  Severe lumbar spondylosis noted with decreased lumbar lordosis.  At L1 there is a pathologic vertebral body fracture with a large lytic lesion involving the vertebral body with a right anterior paraspinal soft tissue mass extending from the vertebral body  At L1-2 there is moderate bilateral bony foraminal stenosis   At L2-3 there is severe bilateral bony foraminal stenosis   At L3-4 there is severe bilateral bony foraminal stenosis   At L4-5 there is severe bilateral lateral recess stenosis.  Severe bilateral bony foraminal stenosis  At L5-S1 there is severe bilateral foraminal stenosis  There are multiple lytic lesions in the bilateral kymberly right-greater-than-left    MRI lumbar spine 02/05/2024 reviewed shows:  There is motion artifact on the axial T2 series at obscures some fine detail  At L1 there is a pathologic fracture with increased STIR signal.  This is soft tissue mass extending from the root vertebral body into the right anterior paraspinals and into the spinal canal  At L1-2 there is severe central stenosis.  Severe bilateral lateral recess stenosis.  Severe bilateral foraminal stenosis   At L2-3 there is moderate bilateral lateral recess stenosis.  Severe bilateral foraminal stenosis  At L3-4 there is moderate bilateral lateral recess stenosis.  Severe bilateral foraminal stenosis  At L4-5 there is severe bilateral lateral recess stenosis.  Severe  left-greater-than-right foraminal stenosis   At L5-S1 there is severe bilateral lateral recess stenosis.  Severe bilateral foraminal stenosis    Past Medical History:   Diagnosis Date    Adenocarcinoma of lung     Dx 2/22/23    BPH (benign prostatic hyperplasia)     Chronic bilateral low back pain with right-sided sciatica     Chronic pain of right knee     Essential (primary) hypertension     Hemiplegia     Hypercholesteremia     Muscle weakness     Nicotine dependence, cigarettes, uncomplicated     Renal disorder     Seizures 2020    last was approx 7 mths ago    Stroke 2015    with residual right sided weakness     Past Surgical History:   Procedure Laterality Date    EPIDURAL STEROID INJECTION INTO LUMBAR SPINE Bilateral 4/9/2024    Procedure: L1-2 MIAN;  Surgeon: Jenna Jenkins MD;  Location: [facility];  Service: Pain Management;  Laterality: Bilateral;    KNEE ARTHROSCOPY Right 1995    KYPHOPLASTY       Social History     Tobacco Use    Smoking status: Former     Current packs/day: 1.00     Average packs/day: 1 pack/day for 44.0 years (44.0 ttl pk-yrs)     Types: Cigarettes     Passive exposure: Current    Smokeless tobacco: Never   Substance Use Topics    Alcohol use: Yes     Comment: approx 12 pk per week & fifth of vodka per week    Drug use: Yes     Types: Marijuana      Current Outpatient Medications   Medication Instructions    acetaminophen (TYLENOL) 325 mg, Oral, Every 6 hours PRN    albuterol (VENTOLIN HFA) 90 mcg/actuation inhaler 2 puffs, Inhalation, Every 4 hours PRN, Rescue    albuterol-ipratropium (DUO-NEB) 2.5 mg-0.5 mg/3 mL nebulizer solution 3 mLs, Nebulization, Every 6 hours PRN, Rescue    amLODIPine (NORVASC) 5 mg, Oral, Daily    atorvastatin (LIPITOR) 40 mg, Oral, Daily    docusate sodium (COLACE) 100 mg, Oral, 2 times daily    finasteride (PROSCAR) 5 mg, Oral, Daily    mometasone (ASMANEX HFA) 200 mcg/actuation HFAA 2 puffs, Inhalation, 2 times daily, Controller    [START ON  5/19/2024] oxyCODONE-acetaminophen (PERCOCET)  mg per tablet 1 tablet, Oral, Every 8 hours PRN    polyethylene glycol (GLYCOLAX) 17 g, Oral, 2 times daily PRN        EXAM:  Constitutional  General Appearance:  There is no height or weight on file to calculate BMI., NAD  Psychiatric   Orientation: Oriented to time, oriented to place, oriented to person  Mood and Affect: Active and alert, normal mood, normal affect  5/5 strength left lower extremity   4/5 strength globally right lower extremity

## 2024-04-17 PROBLEM — C34.91 MALIGNANT NEOPLASM OF RIGHT LUNG: Chronic | Status: ACTIVE | Noted: 2023-03-06

## 2024-04-17 PROBLEM — M54.16 LUMBAR RADICULOPATHY, CHRONIC: Chronic | Status: ACTIVE | Noted: 2024-01-01

## 2024-04-17 PROBLEM — S32.010A CLOSED COMPRESSION FRACTURE OF L1 VERTEBRA: Chronic | Status: ACTIVE | Noted: 2024-01-01

## 2024-05-05 NOTE — ED PROVIDER NOTES
Encounter Date: 5/5/2024    SCRIBE #1 NOTE: I, Abby Mitchell, am scribing for, and in the presence of,  Grayson Bermudez DO. I have scribed the entire note.     History     Chief Complaint   Patient presents with    Cardiac Arrest     68 y.o.male presented to the ED via EMS from home for cardiac arrest. PT family called EMT and the PT was not breathing. He arrived at the hospital still unresponsive and not breathing. Time of death called at 15:29. PT has hx of smoking. PT has medical HX of Seizures, stroke, HTN,hemiplegia, hypercholestermia, muscle weakness, and renal disorder.       The history is provided by the EMS personnel. The history is limited by the condition of the patient. No  was used.     Review of patient's allergies indicates:   Allergen Reactions    Salsalate      Other reaction(s): Abdominal pain    Tramadol      Other reaction(s): Seizure     Past Medical History:   Diagnosis Date    Adenocarcinoma of lung     Dx 2/22/23    BPH (benign prostatic hyperplasia)     Chronic bilateral low back pain with right-sided sciatica     Chronic pain of right knee     Essential (primary) hypertension     Hemiplegia     Hypercholesteremia     Muscle weakness     Nicotine dependence, cigarettes, uncomplicated     Renal disorder     Seizures 2020    last was approx 7 mths ago    Stroke 2015    with residual right sided weakness     Past Surgical History:   Procedure Laterality Date    EPIDURAL STEROID INJECTION INTO LUMBAR SPINE Bilateral 4/9/2024    Procedure: L1-2 MIAN;  Surgeon: Jenna Jenkins MD;  Location: [facility];  Service: Pain Management;  Laterality: Bilateral;    KNEE ARTHROSCOPY Right 1995    KYPHOPLASTY       Family History   Problem Relation Name Age of Onset    Hypertension Mother      Heart disease Mother          mild MI, later in life    Lung cancer Father      Pancreatic cancer Sister      Hyperlipidemia Sister      Hypertension  Sister      Kidney failure Sister      Hypertension Sister      Hyperlipidemia Sister      Hypertension Sister      Hyperlipidemia Sister      Hypertension Sister      Hyperlipidemia Sister      Hypertension Sister      No Known Problems Sister      Heart disease Brother      Hypertension Brother      Hypertension Brother      No Known Problems Daughter      No Known Problems Daughter      No Known Problems Maternal Grandmother      No Known Problems Maternal Grandfather      No Known Problems Paternal Grandmother      No Known Problems Paternal Grandfather       Social History     Tobacco Use    Smoking status: Former     Current packs/day: 1.00     Average packs/day: 1 pack/day for 44.0 years (44.0 ttl pk-yrs)     Types: Cigarettes     Passive exposure: Current    Smokeless tobacco: Never   Substance Use Topics    Alcohol use: Yes     Comment: approx 12 pk per week & fifth of vodka per week    Drug use: Yes     Types: Marijuana     Review of Systems   Respiratory:          Not breathing   Cardiovascular:         Cardiac arrest    All other systems reviewed and are negative.      Physical Exam     Initial Vitals [05/05/24 1536]   BP Pulse Resp Temp SpO2   -- -- -- 98 °F (36.7 °C) --      MAP       --         Physical Exam    Nursing note and vitals reviewed.  Constitutional: He appears distressed.   Cardiovascular:            Cardiac arrest    Musculoskeletal:         General: No tenderness or edema.     Neurological:   Unresponsive        ED Course   Procedures  Labs Reviewed - No data to display       Imaging Results    None          Medications - No data to display  Medical Decision Making            Attending Attestation:           Physician Attestation for Scribe:  Physician Attestation Statement for Scribe #1: I, Grayson Bermudez DO, reviewed documentation, as scribed by Abby Mitchell in my presence, and it is both accurate and complete.                                  Clinical  "Impression:   ***Please document a Clinical Impression and click the "Refresh" button to refresh your note and automatically pull in before signing.***           "

## 2024-05-05 NOTE — ED PROVIDER NOTES
Encounter Date: 5/5/2024       History     Chief Complaint   Patient presents with    Cardiac Arrest     Patient comes in the ER via EMS.  He has fixed and dilated pupils.  No spontaneous respirations.  Asystole on the monitor.  No blood pressure.  No painful stimuli.  No sign of life.  Time of death is 1529        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 sciatica     Chronic pain of right knee     Essential (primary) hypertension     Hemiplegia     Hypercholesteremia     Muscle weakness     Nicotine dependence, cigarettes, uncomplicated     Renal disorder     Seizures 2020    last was approx 7 mths ago    Stroke 2015    with residual right sided weakness     Past Surgical History:   Procedure Laterality Date    EPIDURAL STEROID INJECTION INTO LUMBAR SPINE Bilateral 4/9/2024    Procedure: L1-2 MIAN;  Surgeon: Jenna Jenkins MD;  Location: [facility];  Service: Pain Management;  Laterality: Bilateral;    KNEE ARTHROSCOPY Right 1995    KYPHOPLASTY       Family History   Problem Relation Name Age of Onset    Hypertension Mother      Heart disease Mother          mild MI, later in life    Lung cancer Father      Pancreatic cancer Sister      Hyperlipidemia Sister      Hypertension Sister      Kidney failure Sister      Hypertension Sister      Hyperlipidemia Sister      Hypertension Sister      Hyperlipidemia Sister      Hypertension Sister      Hyperlipidemia Sister      Hypertension Sister      No Known Problems Sister      Heart disease Brother      Hypertension Brother      Hypertension Brother      No Known Problems Daughter      No Known Problems Daughter      No Known Problems Maternal Grandmother      No Known  Problems Maternal Grandfather      No Known Problems Paternal Grandmother      No Known Problems Paternal Grandfather       Social History     Tobacco Use    Smoking status: Former     Current packs/day: 1.00     Average packs/day: 1 pack/day for 44.0 years (44.0 ttl pk-yrs)     Types: Cigarettes     Passive exposure: Current    Smokeless tobacco: Never   Substance Use Topics    Alcohol use: Yes     Comment: approx 12 pk per week & fifth of vodka per week    Drug use: Yes     Types: Marijuana     Review of Systems   Constitutional:  Negative for fever.        Patient comes in with no spontaneous respirations no pull no painful stimuli no blood pressure eyes fixed and dilated.   HENT: Negative.  Negative for sore throat.    Eyes: Negative.    Respiratory: Negative.  Negative for shortness of breath.    Cardiovascular: Negative.  Negative for chest pain.   Gastrointestinal: Negative.  Negative for nausea.   Endocrine: Negative.    Genitourinary: Negative.  Negative for dysuria.   Musculoskeletal: Negative.  Negative for back pain.   Skin: Negative.  Negative for rash.   Allergic/Immunologic: Negative.    Neurological: Negative.  Negative for weakness.   Hematological: Negative.  Does not bruise/bleed easily.   Psychiatric/Behavioral: Negative.         Physical Exam     Initial Vitals [05/05/24 1536]   BP Pulse Resp Temp SpO2   -- -- -- 98 °F (36.7 °C) --      MAP       --         Physical Exam    Constitutional:   Patient in with no pulse, no blood pressure.  Pupils fixed and dilated.  No spontaneous respirations.  No painful stimuli.  Patient time of death is 3:29 p.m.           Medical Screening Exam   See Full Note    ED Course   Procedures  Labs Reviewed - No data to display       Imaging Results    None          Medications - No data to display  Medical Decision Making                        Medical Decision Making:   Initial Assessment:       Patient comes in the ER via EMS.  He has fixed and dilated pupils.  No  spontaneous respirations.  Asystole on the monitor.  No blood pressure.  No painful stimuli.  No sign of life.  Time of death is 1529              Physical Exam    Constitutional:   Patient in with no pulse, no blood pressure.  Pupils fixed and dilated.  No spontaneous respirations.  No painful stimuli.  Patient time of death is 3:29 p.m.         ED Management:  Cardiopulmonary arrest             Clinical Impression:   Final diagnoses:  [I46.9] Cardiac arrest (Primary)  [I46.9] Cardiopulmonary arrest        ED Disposition Condition                     Grayson Bermudez, DO  24 1546

## 2024-05-05 NOTE — ED NOTES
Called and spoke with Jud JOSHI. Reference # [id number]. Not a candidate due to cancer diagnosis.

## 2024-05-23 NOTE — PROGRESS NOTES
Patient Name: Steven Campo   Primary Care Provider: Dawn Dumont FNP   Date of Service: 02/20/2024   Reason for Referral:  Hospital follow up    Chief Complaint: Hospital Follow Up      Subjective:      Steven Campo is 68 y.o. male with past medical history significant for known stage IV adenocarcinoma of the lung.  I first saw the patient during his inpatient visit (at the time he was DNR, documented as being on palliative care)    From my inpatient note    Pleural effusion   He has a large right-sided pleural effusion which is likely in the setting of compressive atelectasis from his endobronchial disease versus malignant pleural effusion.  He also has a mediastinal shift.  When compared to his initial CT from November 2022, he had a very similar obstruction of the bronchus intermedius.  At this time his endobronchial obstruction appears to be very similar however he does have increasing pleural effusion which may be in the setting of compressive atelectasis.     He did have evidence of endobronchial disease during his bronchoscopy in February 2023.     Given that he is minimally symptomatic at this time, stenting the bronchus intermedius is unlikely to be of any additional benefit.  He is yet to complete his radiation treatment at this time and was most recently on systemic treatment for his lung cancer.  His mild dyspnea may be improved by helping with his pleural effusion (which may benefit by placement of PleurX catheter).     Recommendations   Continue to monitor hemoglobin/hematocrit and coagulation studies to ensure stabilization  We will plan to perform airway exam with bronchoalveolar lavage (for concern regarding postobstructive pneumonia) under general anesthesia on 2/7/24   Under the same general anesthesia, we will  consider placement of a PleurX catheter in the right pleural space      Images below compares the bronchus intermedius obstruction in November 2022 to February 2024.  Increasing  pleural effusion (which may be contributing to the patient's mild dyspnea and may possibly be parapneumonic or secondary to the compressive atelectasis, or a malignant pleural effusion given the ipsilateral lung cancer)    Mass of right lung, stage IV adenocarcinoma of the lung currently on treatment    As above       He then underwent bronchoscopy on 2/7/24.    Overall Impression:  Successful airway inspection which showed presence of right upper lobe obstruction (complete) and bronchus intermedius (90%) from tumor.  Friable, no active bleeding.  Unable to traverse bronchus intermedius endobronchial lesion.  Left bronchial tree without evidence of endobronchial lesion.  Bronchoalveolar lavage performed in the right mainstem without evidence of gross purulence.    I also performed thoracic ultrasound at that time which showed no safe window to place a PleurX catheter.  As noted in my thoracic ultrasound procedure note    Status post bronchoscopy with near complete obstruction of right bronchial tree with endobronchial mass and observation of pleural space with ultrasound, the patient may not benefit from placement of PleurX catheter as there is significant doubt that the patient would have lung expansion with the extent of both endobronchial tumor and extent of tumor seen on the ultrasound and CT.        Assessment and Plan      Massive right lung, stage IV adenocarcinoma   Right upper lobe complete obstruction and 90% obstruction of bronchus intermedius   Right-sided pleural effusion      Assessment:  Patient is stable today, not symptomatic from his symptoms, it appears that he has compensated well, breathing from his left lung.  Patient and family not in favor of additional procedures at this time has a patient has minimal symptoms.    Plan  Patient family agreed for repeat CT scan which will be performed before we see him next.    No acute pulmonary intervention at this time warranted      Gonzalo Robert  MD  Interventional Pulmonary and Critical Care  [facility]      Problem List Items Addressed This Visit    None  Visit Diagnoses       Pleural effusion, not elsewhere classified    -  Primary    Relevant Orders    CT Chest With Contrast (Completed)                Past Medical History:   Diagnosis Date    Adenocarcinoma of lung     Dx 2/22/23    BPH (benign prostatic hyperplasia)     Chronic bilateral low back pain with right-sided sciatica     Chronic pain of right knee     Essential (primary) hypertension     Hemiplegia     Hypercholesteremia     Muscle weakness     Nicotine dependence, cigarettes, uncomplicated     Renal disorder     Seizures 2020    last was approx 7 mths ago    Stroke 2015    with residual right sided weakness      Past Surgical History:   Procedure Laterality Date    EPIDURAL STEROID INJECTION INTO LUMBAR SPINE Bilateral 4/9/2024    Procedure: L1-2 MIAN;  Surgeon: Jenna Jenkins MD;  Location: [facility];  Service: Pain Management;  Laterality: Bilateral;    KNEE ARTHROSCOPY Right 1995    KYPHOPLASTY       Family History   Problem Relation Name Age of Onset    Hypertension Mother      Heart disease Mother          mild MI, later in life    Lung cancer Father      Pancreatic cancer Sister      Hyperlipidemia Sister      Hypertension Sister      Kidney failure Sister      Hypertension Sister      Hyperlipidemia Sister      Hypertension Sister      Hyperlipidemia Sister      Hypertension Sister      Hyperlipidemia Sister      Hypertension Sister      No Known Problems Sister      Heart disease Brother      Hypertension Brother      Hypertension Brother      No Known Problems Daughter      No Known Problems Daughter      No Known Problems Maternal Grandmother      No Known Problems Maternal Grandfather      No Known Problems Paternal Grandmother      No Known Problems Paternal Grandfather       Review of patient's allergies indicates:   Allergen Reactions    Salsalate       Other reaction(s): Abdominal pain    Tramadol      Other reaction(s): Seizure      Social History     Tobacco Use    Smoking status: Former     Current packs/day: 1.00     Average packs/day: 1 pack/day for 44.0 years (44.0 ttl pk-yrs)     Types: Cigarettes     Passive exposure: Current    Smokeless tobacco: Never   Substance Use Topics    Alcohol use: Yes     Comment: approx 12 pk per week & fifth of vodka per week    Drug use: Yes     Types: Marijuana      Review of Systems       Objective:      Physical Exam       Physical Exam  Constitutional:       General: He is not in acute distress.     Appearance: Normal appearance. He is normal weight. He is not ill-appearing or diaphoretic.   HENT:      Head: Normocephalic and atraumatic.      Nose: No congestion or rhinorrhea.      Mouth/Throat:      Mouth: Mucous membranes are moist.   Cardiovascular:      Rate and Rhythm: Normal rate and regular rhythm.      Pulses: Normal pulses.      Heart sounds: Normal heart sounds.   Pulmonary:      Effort: Pulmonary effort is normal. No respiratory distress.      Breath sounds: No stridor. No wheezing, rhonchi or rales.      Comments:   Decreased breath sounds right hemithorax  Chest:      Chest wall: No tenderness.   Abdominal:      General: Abdomen is flat.   Musculoskeletal:      Cervical back: Normal range of motion. No rigidity.      Right lower leg: No edema.      Left lower leg: No edema.   Skin:     General: Skin is warm.      Findings: No erythema.   Neurological:      General: No focal deficit present.      Mental Status: He is alert and oriented to person, place, and time. Mental status is at baseline.   Psychiatric:         Mood and Affect: Mood normal.         Behavior: Behavior normal.         Thought Content: Thought content normal.          5/5/2024     3:36 PM 4/10/2024     3:35 PM 4/10/2024     2:19 PM 4/9/2024    11:50 PM 4/9/2024     1:15 PM 4/9/2024     1:10 PM 4/9/2024     1:05 PM   Pulmonary Function Tests  "  SpO2  98 % 99 % 98 % 94 % 96 % 97 %   Height 5' 11" (1.803 m)   5' 11" (1.803 m)      Weight 47.6 kg (105 lb)   52.2 kg (115 lb)      BMI (Calculated) 14.7   16            Outpatient Encounter Medications as of 2/20/2024   Medication Sig Dispense Refill    albuterol (VENTOLIN HFA) 90 mcg/actuation inhaler Inhale 2 puffs into the lungs every 4 (four) hours as needed for Wheezing or Shortness of Breath. Rescue 54 g 1    albuterol-ipratropium (DUO-NEB) 2.5 mg-0.5 mg/3 mL nebulizer solution Take 3 mLs by nebulization every 6 (six) hours as needed for Wheezing or Shortness of Breath. Rescue 360 mL 1    amLODIPine (NORVASC) 5 MG tablet Take 1 tablet (5 mg total) by mouth once daily. 90 tablet 1    atorvastatin (LIPITOR) 40 MG tablet Take 1 tablet (40 mg total) by mouth once daily. 90 tablet 3    finasteride (PROSCAR) 5 mg tablet Take 5 mg by mouth once daily.      mometasone (ASMANEX HFA) 200 mcg/actuation HFAA Inhale 2 puffs into the lungs 2 (two) times a day. Controller 39 g 3    [DISCONTINUED] HYDROcodone-acetaminophen (NORCO)  mg per tablet Take 1 tablet by mouth every 6 (six) hours as needed.      [DISCONTINUED] HYDROcodone-acetaminophen (NORCO) 5-325 mg per tablet Take 1 tablet by mouth every 6 (six) hours as needed for Pain.      [DISCONTINUED] nicotine (NICODERM CQ) 14 mg/24 hr Place 1 patch onto the skin once daily. (Patient not taking: Reported on 2/20/2024)       No facility-administered encounter medications on file as of 2/20/2024.       Assessment & Plan    As above                                          Orders Placed This Encounter   Procedures    CT Chest With Contrast     Standing Status:   Future     Number of Occurrences:   1     Standing Expiration Date:   2/20/2025     Order Specific Question:   Does this patient have impaired renal function?     Answer:   No     Order Specific Question:   May the Radiologist modify the order per protocol to meet the clinical needs of the patient?     " Answer:   Yes

## (undated) DEVICE — GLOVE PROTEXIS PI SYN SURG 6.5

## (undated) DEVICE — CATH IV 22G X 1 AUTOGUARD

## (undated) DEVICE — SOL CONTINU-FLO SET 2 LAV

## (undated) DEVICE — KIT IV START RUSH

## (undated) DEVICE — SLIPPER FALL PREV YELLOW XLG

## (undated) DEVICE — APPLICATOR CHLORAPREP ORN 26ML

## (undated) DEVICE — TRAY EPIDURAL SINGLE SHOT PMA